# Patient Record
Sex: FEMALE | Race: WHITE | NOT HISPANIC OR LATINO | Employment: OTHER | URBAN - METROPOLITAN AREA
[De-identification: names, ages, dates, MRNs, and addresses within clinical notes are randomized per-mention and may not be internally consistent; named-entity substitution may affect disease eponyms.]

---

## 2024-04-29 ENCOUNTER — OFFICE VISIT (OUTPATIENT)
Dept: FAMILY MEDICINE CLINIC | Facility: CLINIC | Age: 54
End: 2024-04-29

## 2024-04-29 VITALS
OXYGEN SATURATION: 98 % | SYSTOLIC BLOOD PRESSURE: 98 MMHG | HEIGHT: 67 IN | HEART RATE: 88 BPM | TEMPERATURE: 98.8 F | WEIGHT: 133 LBS | BODY MASS INDEX: 20.88 KG/M2 | DIASTOLIC BLOOD PRESSURE: 70 MMHG

## 2024-04-29 DIAGNOSIS — F41.9 ANXIETY: ICD-10-CM

## 2024-04-29 DIAGNOSIS — Z87.891 HISTORY OF TOBACCO ABUSE: ICD-10-CM

## 2024-04-29 DIAGNOSIS — Z79.899 BENZODIAZEPINE USE AGREEMENT EXISTS: ICD-10-CM

## 2024-04-29 DIAGNOSIS — E55.9 VITAMIN D DEFICIENCY: ICD-10-CM

## 2024-04-29 DIAGNOSIS — F51.01 PRIMARY INSOMNIA: ICD-10-CM

## 2024-04-29 DIAGNOSIS — R06.02 SHORTNESS OF BREATH: ICD-10-CM

## 2024-04-29 DIAGNOSIS — E07.9 THYROID DISORDER: ICD-10-CM

## 2024-04-29 DIAGNOSIS — M18.0 PRIMARY OSTEOARTHRITIS OF BOTH FIRST CARPOMETACARPAL JOINTS: ICD-10-CM

## 2024-04-29 DIAGNOSIS — Z00.00 ANNUAL PHYSICAL EXAM: Primary | ICD-10-CM

## 2024-04-29 DIAGNOSIS — R00.2 PALPITATIONS: ICD-10-CM

## 2024-04-29 DIAGNOSIS — I45.6 WPW (WOLFF-PARKINSON-WHITE SYNDROME): ICD-10-CM

## 2024-04-29 DIAGNOSIS — G43.009 MIGRAINE WITHOUT AURA AND WITHOUT STATUS MIGRAINOSUS, NOT INTRACTABLE: ICD-10-CM

## 2024-04-29 DIAGNOSIS — Z13.220 SCREENING FOR LIPID DISORDERS: ICD-10-CM

## 2024-04-29 DIAGNOSIS — K21.9 GASTROESOPHAGEAL REFLUX DISEASE WITHOUT ESOPHAGITIS: ICD-10-CM

## 2024-04-29 PROBLEM — Z86.16 HISTORY OF COVID-19: Status: RESOLVED | Noted: 2020-07-16 | Resolved: 2024-04-29

## 2024-04-29 PROBLEM — R87.610 ASCUS OF CERVIX WITH NEGATIVE HIGH RISK HPV: Status: RESOLVED | Noted: 2021-04-28 | Resolved: 2024-04-29

## 2024-04-29 PROCEDURE — 93000 ELECTROCARDIOGRAM COMPLETE: CPT | Performed by: NURSE PRACTITIONER

## 2024-04-29 PROCEDURE — 99396 PREV VISIT EST AGE 40-64: CPT | Performed by: NURSE PRACTITIONER

## 2024-04-29 RX ORDER — ALBUTEROL SULFATE 90 UG/1
2 AEROSOL, METERED RESPIRATORY (INHALATION) EVERY 6 HOURS PRN
Qty: 18 G | Refills: 2 | Status: SHIPPED | OUTPATIENT
Start: 2024-04-29

## 2024-04-29 NOTE — PROGRESS NOTES
ADULT ANNUAL PHYSICAL  [facility] PRACT[facility]    NAME: Emely Thibodeaux  AGE: 53 y.o. SEX: female  : 1970     DATE: 2024     Assessment and Plan:     Problem List Items Addressed This Visit          Cardiovascular and Mediastinum    WPW (Pedrito-Parkinson-White syndrome)     Not currently following with cardiology and would like a referral back to cardiology to evaluate has been having episodes of increased heart rate IH EKG showing a NSR will apply holter monitor and evaluate for heart rate changes          Relevant Orders    Comprehensive metabolic panel    CBC and differential    Ambulatory Referral to Cardiology    RESOLVED: Migraine without aura and without status migrainosus, not intractable       Digestive    Gastroesophageal reflux disease without esophagitis     Taking the Protonix 40 mg and is effective             Endocrine    Thyroid disorder     TSH ordered          Relevant Orders    Comprehensive metabolic panel    TSH, 3rd generation with Free T4 reflex       Musculoskeletal and Integument    Osteoarthritis of carpometacarpal (CMC) joint of thumb     Taking a supplement and is effective             Behavioral Health    Anxiety     Taking prn xanax smallest amount and is effective          Relevant Orders    TSH, 3rd generation with Free T4 reflex    RESOLVED: History of tobacco abuse       Neurology/Sleep    Insomnia     Taking prn xanax             Other    Vitamin D deficiency    Relevant Orders    Vitamin D 25 hydroxy    Benzodiazepine use agreement exists    Annual physical exam - Primary    Screening for lipid disorders    Relevant Orders    Lipid panel     Other Visit Diagnoses       Shortness of breath        Relevant Medications    albuterol (PROVENTIL HFA,VENTOLIN HFA) 90 mcg/act inhaler    Palpitations        Relevant Orders    POCT ECG (Completed)            Immunizations and preventive care screenings were discussed with patient  today. Appropriate education was printed on patient's after visit summary.    Counseling:  Dental Health: discussed importance of regular tooth brushing, flossing, and dental visits.  Exercise: the importance of regular exercise/physical activity was discussed. Recommend exercise 3-5 times per week for at least 30 minutes.       Depression Screening and Follow-up Plan: Patient was screened for depression during today's encounter. They screened negative with a PHQ-2 score of 0.        No follow-ups on file.     Chief Complaint:     Chief Complaint   Patient presents with    Annual Exam      History of Present Illness:     Adult Annual Physical   Patient here for a comprehensive physical exam. The patient reports no problems.  Patient here today for her check up and reports that she has lost 20 pounds since year and does reports that she is doing well and reports that she had a colonoscopy and reports that she has another once scheduled for this summer had positive polyp. Patient was also  recently. Patient has to update her controlled agreement as well. She also reports that she would like a referral for cardiology she is concerned that she I shaving faster heart rate and is thinking she is having an exacerbation of her WPW and had episodes of fast heart rate.        Diet and Physical Activity  Diet/Nutrition: well balanced diet.   Exercise: moderate cardiovascular exercise, strength training exercises, and 30-60 minutes on average.      Depression Screening  PHQ-2/9 Depression Screening    Little interest or pleasure in doing things: 0 - not at all  Feeling down, depressed, or hopeless: 0 - not at all  PHQ-2 Score: 0  PHQ-2 Interpretation: Negative depression screen       General Health  Sleep: sleeps well.   Hearing: normal - bilateral.  Vision: no vision problems.   Dental: regular dental visits.       /GYN Health  Follows with gynecology? yes   Patient is: perimenopausal  Last menstrual period: every  three weeks  Contraceptive method:  nuva ring .    Advanced Care Planning  Do you have an advanced directive? no  Do you have a durable medical power of ? no  ACP document given to the patient? no     Review of Systems:     Review of Systems   Constitutional:  Negative for activity change, appetite change, chills, diaphoresis, fatigue, fever and unexpected weight change.   HENT:  Negative for congestion, ear pain, hearing loss, postnasal drip, sinus pressure, sinus pain, sneezing and sore throat.    Eyes:  Negative for pain, redness and visual disturbance.   Respiratory:  Negative for cough and shortness of breath.    Cardiovascular:  Positive for palpitations. Negative for chest pain and leg swelling.   Gastrointestinal:  Negative for abdominal pain, diarrhea, nausea and vomiting.   Endocrine: Negative.    Genitourinary: Negative.    Musculoskeletal:  Negative for arthralgias.   Skin: Negative.    Allergic/Immunologic: Negative.    Neurological:  Negative for dizziness and light-headedness.   Hematological: Negative.    Psychiatric/Behavioral:  Positive for sleep disturbance. Negative for behavioral problems and dysphoric mood.       Past Medical History:     Past Medical History:   Diagnosis Date    Anxiety     Arthritis     Chronic pain     Neck. Numbness in fingers    CTS (carpal tunnel syndrome)     Right    DDD (degenerative disc disease), cervical     DDD (degenerative disc disease), lumbar     History of COVID-19     History of echocardiogram 11/26/2012    EF 55%, NWMA, Normal    Insomnia     Orbital floor (blow-out) closed fracture (HCC) 1/20/2011    Palpitations     Pneumonia 2008    WPW (Pedrito-Parkinson-White syndrome)       Past Surgical History:     Past Surgical History:   Procedure Laterality Date    AUGMENTATION MAMMAPLASTY Bilateral 2010    AUGMENTATION MAMMAPLASTY Bilateral 04/01/2022    redone    BREAST SURGERY Bilateral     Augmentation    EPIDURAL BLOCK INJECTION N/A 05/03/2018     Procedure: CERVICAL EPIDURAL STEROID INJECTION;  Surgeon: Kye Webster MD;  Location: [facility];  Service: Pain Management     IA DSTR NROLYTC AGNT PARVERTEB FCT SNGL LMBR/SACRAL Right 12/15/2016    Procedure: LUMBAR RADIOFREQUENCY LESIONING ;  Surgeon: Kye Webster MD;  Location: [facility];  Service: Pain Management     IA NJX DX/THER AGT PVRT FACET JT CRV/THRC 1 LEVEL Right 2018    Procedure: C4-5 C5-6 C7-T1 CERVICAL MEDIAL BRANCH BLOCK;  Surgeon: Kye Webster MD;  Location: [facility];  Service: Pain Management     ROTATOR CUFF REPAIR Right     THYROIDECTOMY, PARTIAL      TRANSUMBILICAL AUGMENTATION MAMMAPLASTY Bilateral       Social History:     Social History     Socioeconomic History    Marital status:      Spouse name: None    Number of children: None    Years of education: None    Highest education level: None   Occupational History    None   Tobacco Use    Smoking status: Former     Current packs/day: 0.00     Average packs/day: 0.3 packs/day for 10.0 years (2.5 ttl pk-yrs)     Types: Cigarettes     Start date: 2008     Quit date: 2018     Years since quittin.0    Smokeless tobacco: Never   Vaping Use    Vaping status: Never Used   Substance and Sexual Activity    Alcohol use: Not Currently     Alcohol/week: 1.0 standard drink of alcohol     Types: 1 Cans of beer per week     Comment: few times week    Drug use: No    Sexual activity: None   Other Topics Concern    None   Social History Narrative    Caffeine use      Social Determinants of Health     Financial Resource Strain: Not on file   Food Insecurity: Not on file   Transportation Needs: Not on file   Physical Activity: Not on file   Stress: Not on file   Social Connections: Not on file   Intimate Partner Violence: Not on file   Housing Stability: Not on file      Family History:     Family History   Problem Relation Age of Onset    Endometrial cancer Mother 70    Arthritis Father     No Known Problems Sister     No  "Known Problems Daughter     No Known Problems Daughter     Lung cancer Maternal Grandmother     No Known Problems Paternal Grandmother     Lung cancer Maternal Aunt     Breast cancer Maternal Aunt 50    Breast cancer Maternal Aunt 58    No Known Problems Maternal Aunt     Cancer Paternal Aunt     Colon cancer Paternal Aunt 58    Breast cancer Paternal Aunt 45    Cancer Paternal Aunt     No Known Problems Paternal Aunt     Thyroid cancer Cousin       Current Medications:     Current Outpatient Medications   Medication Sig Dispense Refill    albuterol (PROVENTIL HFA,VENTOLIN HFA) 90 mcg/act inhaler Inhale 2 puffs every 6 (six) hours as needed for wheezing 18 g 2    ALPRAZolam (XANAX) 0.25 mg tablet take 1 tablet by mouth twice a day if needed for anxiety 60 tablet 0    Biotin 10 MG TABS Take 1 tablet by mouth daily      etonogestrel-ethinyl estradiol (NuvaRing) 0.12-0.015 MG/24HR vaginal ring Insert vaginally and leave in place for 3 consecutive weeks, then remove for 1 week. 3 each 5    multivitamin (THERAGRAN) TABS Take 1 tablet by mouth daily      pantoprazole (PROTONIX) 40 mg tablet take 1 tablet by mouth daily 90 tablet 1    traZODone (DESYREL) 100 mg tablet take 3 tablets by mouth daily at bedtime 270 tablet 1     No current facility-administered medications for this visit.      Allergies:     Allergies   Allergen Reactions    Sulfa Antibiotics Hives      Physical Exam:     BP 98/70 (BP Location: Left arm, Patient Position: Sitting, Cuff Size: Adult)   Pulse 88   Temp 98.8 °F (37.1 °C)   Ht 5' 7\" (1.702 m)   Wt 60.3 kg (133 lb)   SpO2 98%   BMI 20.83 kg/m²     Physical Exam  Vitals and nursing note reviewed.   Constitutional:       General: She is not in acute distress.     Appearance: She is well-developed.   HENT:      Head: Normocephalic and atraumatic.   Eyes:      Conjunctiva/sclera: Conjunctivae normal.   Cardiovascular:      Rate and Rhythm: Normal rate and regular rhythm.      Heart sounds: No " murmur heard.  Pulmonary:      Effort: Pulmonary effort is normal. No respiratory distress.      Breath sounds: Normal breath sounds.   Abdominal:      Palpations: Abdomen is soft.      Tenderness: There is no abdominal tenderness.   Musculoskeletal:         General: No swelling.      Cervical back: Neck supple.   Skin:     General: Skin is warm and dry.      Capillary Refill: Capillary refill takes less than 2 seconds.   Neurological:      Mental Status: She is alert.   Psychiatric:         Mood and Affect: Mood normal.          ROHIT Suazo  [facility]

## 2024-04-29 NOTE — ASSESSMENT & PLAN NOTE
Not currently following with cardiology and would like a referral back to cardiology to evaluate has been having episodes of increased heart rate IH EKG showing a NSR will apply holter monitor and evaluate for heart rate changes

## 2024-05-01 LAB
1OH-MIDAZOLAM UR-MCNC: NEGATIVE NG/ML
6-BETA NALTREXOL UR CFM-MCNC: NEGATIVE NG/ML
6MAM UR QL: NEGATIVE NG/ML
A-OH ALPRAZ UR-MCNC: 113 NG/ML
A-OH-TRIAZOLAM UR-MCNC: NEGATIVE NG/ML
AMPHET UR-MCNC: NEGATIVE NG/ML
AMPHETAMINES UR QL: ABNORMAL NG/ML
BARBITURATES UR QL: NEGATIVE NG/ML
BENZODIAZ UR QL: POSITIVE NG/ML
BUPRENORPHINE UR QL: NEGATIVE NG/ML
BUTYLONE: NEGATIVE NG/ML
BZE UR QL: NEGATIVE NG/ML
CREAT UR-MCNC: 242.6 MG/DL
ETHANOL UR QL: POSITIVE NG/ML
ETHYL GLUCURONIDE UR-MCNC: 3880 NG/ML
ETHYL SULFATE UR-MCNC: 306 NG/ML
FENTANYL: NEGATIVE NG/ML
GABAPENTIN UR-MCNC: NEGATIVE NG/ML
LORAZEPAM UR-MCNC: NEGATIVE NG/ML
MDPV: NEGATIVE NG/ML
MEPHEDRONE: NEGATIVE NG/ML
METHADONE UR QL: NEGATIVE NG/ML
METHAMPHET UR-MCNC: NEGATIVE NG/ML
METHYLONE: NEGATIVE NG/ML
NALTREXONE UR-MCNC: NEGATIVE NG/ML
NORDIAZEPAM UR-MCNC: NEGATIVE NG/ML
OPIATES UR QL: NEGATIVE NG/ML
OXAZEPAM UR-MCNC: NEGATIVE NG/ML
OXIDANTS UR QL: NEGATIVE MCG/ML
OXYCODONE UR QL: NEGATIVE NG/ML
PCP UR QL: NEGATIVE NG/ML
PH UR: 5 [PH] (ref 4.5–9)
SL AMB AMINOCLONAZEPAM: NEGATIVE NG/ML
SL AMB HYDROXYETHYLFLURAZEPAM: NEGATIVE NG/ML
SL AMB MEDMATCH AOH ALPRAZOLAM: ABNORMAL
SL AMB MEDMATCH ETG: ABNORMAL
SL AMB MEDMATCH ETS: ABNORMAL
SL AMB PREGABALIN: NEGATIVE NG/ML
SL AMB RITALINIC ACID: NEGATIVE NG/ML
TEMAZEPAM UR-MCNC: NEGATIVE NG/ML
THC UR QL: NEGATIVE NG/ML

## 2024-05-07 ENCOUNTER — TELEPHONE (OUTPATIENT)
Dept: FAMILY MEDICINE CLINIC | Facility: CLINIC | Age: 54
End: 2024-05-07

## 2024-05-07 ENCOUNTER — PREP FOR PROCEDURE (OUTPATIENT)
Age: 54
End: 2024-05-07

## 2024-05-07 ENCOUNTER — TELEPHONE (OUTPATIENT)
Age: 54
End: 2024-05-07

## 2024-05-07 DIAGNOSIS — Z86.010 HX OF COLONIC POLYPS: Primary | ICD-10-CM

## 2024-05-07 NOTE — TELEPHONE ENCOUNTER
05/07/24  Screened by: Leora Chino    Referring Provider Dr. Montes    Pre- Screening:     There is no height or weight on file to calculate BMI.  Has patient been referred for a routine screening Colonoscopy? yes  Is the patient between 45-75 years old? yes      Previous Colonoscopy    If yes:    Date: 2023    Facility:     Reason:         Does the patient want to see a Gastroenterologist prior to their procedure OR are they having any GI symptoms? no    Has the patient been hospitalized or had abdominal surgery in the past 6 months? no    Does the patient use supplemental oxygen? no    Does the patient take Coumadin, Lovenox, Plavix, Elliquis, Xarelto, or other blood thinning medication? no    Has the patient had a stroke, cardiac event, or stent placed in the past year? no

## 2024-05-07 NOTE — TELEPHONE ENCOUNTER
Scheduled date of colonoscopy (as of today): 7/18/24    Physician performing colonoscopy: Dr. Doherty    Location of colonoscopy:  [facility]    Bowel prep reviewed with patient:  Miralax    Prep Instructions sent via [employer]    Pt daughter called and scheduled appt. Will have patient upload or call in with updated insurance info

## 2024-05-15 ENCOUNTER — APPOINTMENT (OUTPATIENT)
Dept: LAB | Facility: CLINIC | Age: 54
End: 2024-05-15
Payer: COMMERCIAL

## 2024-05-15 DIAGNOSIS — F41.9 ANXIETY: ICD-10-CM

## 2024-05-15 DIAGNOSIS — E07.9 THYROID DISORDER: ICD-10-CM

## 2024-05-15 DIAGNOSIS — Z13.220 SCREENING FOR LIPID DISORDERS: ICD-10-CM

## 2024-05-15 DIAGNOSIS — E55.9 VITAMIN D DEFICIENCY: ICD-10-CM

## 2024-05-15 DIAGNOSIS — I45.6 WPW (WOLFF-PARKINSON-WHITE SYNDROME): ICD-10-CM

## 2024-05-15 LAB
25(OH)D3 SERPL-MCNC: 74.5 NG/ML (ref 30–100)
ALBUMIN SERPL BCP-MCNC: 4 G/DL (ref 3.5–5)
ALP SERPL-CCNC: 25 U/L (ref 34–104)
ALT SERPL W P-5'-P-CCNC: 14 U/L (ref 7–52)
ANION GAP SERPL CALCULATED.3IONS-SCNC: 6 MMOL/L (ref 4–13)
AST SERPL W P-5'-P-CCNC: 14 U/L (ref 13–39)
BASOPHILS # BLD AUTO: 0.04 THOUSANDS/ÂΜL (ref 0–0.1)
BASOPHILS NFR BLD AUTO: 1 % (ref 0–1)
BILIRUB SERPL-MCNC: 0.4 MG/DL (ref 0.2–1)
BUN SERPL-MCNC: 16 MG/DL (ref 5–25)
CALCIUM SERPL-MCNC: 8.8 MG/DL (ref 8.4–10.2)
CHLORIDE SERPL-SCNC: 108 MMOL/L (ref 96–108)
CHOLEST SERPL-MCNC: 186 MG/DL
CO2 SERPL-SCNC: 26 MMOL/L (ref 21–32)
CREAT SERPL-MCNC: 0.91 MG/DL (ref 0.6–1.3)
EOSINOPHIL # BLD AUTO: 0.19 THOUSAND/ÂΜL (ref 0–0.61)
EOSINOPHIL NFR BLD AUTO: 5 % (ref 0–6)
ERYTHROCYTE [DISTWIDTH] IN BLOOD BY AUTOMATED COUNT: 13.4 % (ref 11.6–15.1)
GFR SERPL CREATININE-BSD FRML MDRD: 72 ML/MIN/1.73SQ M
GLUCOSE P FAST SERPL-MCNC: 89 MG/DL (ref 65–99)
HCT VFR BLD AUTO: 41.5 % (ref 34.8–46.1)
HDLC SERPL-MCNC: 84 MG/DL
HGB BLD-MCNC: 13.2 G/DL (ref 11.5–15.4)
IMM GRANULOCYTES # BLD AUTO: 0.01 THOUSAND/UL (ref 0–0.2)
IMM GRANULOCYTES NFR BLD AUTO: 0 % (ref 0–2)
LDLC SERPL CALC-MCNC: 84 MG/DL (ref 0–100)
LYMPHOCYTES # BLD AUTO: 1.05 THOUSANDS/ÂΜL (ref 0.6–4.47)
LYMPHOCYTES NFR BLD AUTO: 25 % (ref 14–44)
MCH RBC QN AUTO: 29.6 PG (ref 26.8–34.3)
MCHC RBC AUTO-ENTMCNC: 31.8 G/DL (ref 31.4–37.4)
MCV RBC AUTO: 93 FL (ref 82–98)
MONOCYTES # BLD AUTO: 0.25 THOUSAND/ÂΜL (ref 0.17–1.22)
MONOCYTES NFR BLD AUTO: 6 % (ref 4–12)
NEUTROPHILS # BLD AUTO: 2.61 THOUSANDS/ÂΜL (ref 1.85–7.62)
NEUTS SEG NFR BLD AUTO: 63 % (ref 43–75)
NONHDLC SERPL-MCNC: 102 MG/DL
NRBC BLD AUTO-RTO: 0 /100 WBCS
PLATELET # BLD AUTO: 211 THOUSANDS/UL (ref 149–390)
PMV BLD AUTO: 11.1 FL (ref 8.9–12.7)
POTASSIUM SERPL-SCNC: 4.2 MMOL/L (ref 3.5–5.3)
PROT SERPL-MCNC: 6.5 G/DL (ref 6.4–8.4)
RBC # BLD AUTO: 4.46 MILLION/UL (ref 3.81–5.12)
SODIUM SERPL-SCNC: 140 MMOL/L (ref 135–147)
TRIGL SERPL-MCNC: 89 MG/DL
TSH SERPL DL<=0.05 MIU/L-ACNC: 1.34 UIU/ML (ref 0.45–4.5)
WBC # BLD AUTO: 4.15 THOUSAND/UL (ref 4.31–10.16)

## 2024-05-15 PROCEDURE — 84443 ASSAY THYROID STIM HORMONE: CPT

## 2024-05-15 PROCEDURE — 80061 LIPID PANEL: CPT

## 2024-05-15 PROCEDURE — 82306 VITAMIN D 25 HYDROXY: CPT

## 2024-05-15 PROCEDURE — 85025 COMPLETE CBC W/AUTO DIFF WBC: CPT

## 2024-05-15 PROCEDURE — 80053 COMPREHEN METABOLIC PANEL: CPT

## 2024-05-15 PROCEDURE — 36415 COLL VENOUS BLD VENIPUNCTURE: CPT

## 2024-05-16 ENCOUNTER — TELEPHONE (OUTPATIENT)
Dept: FAMILY MEDICINE CLINIC | Facility: CLINIC | Age: 54
End: 2024-05-16

## 2024-05-16 NOTE — TELEPHONE ENCOUNTER
----- Message from ROHIT Tejada sent at 5/15/2024  7:44 PM EDT -----  Thyroid is normal   Lipid panel was normal   CMP with normal liver and kidney function and electrolytes   CBC was normal   Vitamin D was normal

## 2024-05-16 NOTE — TELEPHONE ENCOUNTER
Patient returned call.    Advised of results per provider's message.    Patient expressed understanding.

## 2024-05-27 DIAGNOSIS — G47.00 INSOMNIA, UNSPECIFIED TYPE: ICD-10-CM

## 2024-05-27 RX ORDER — TRAZODONE HYDROCHLORIDE 100 MG/1
TABLET ORAL
Qty: 270 TABLET | Refills: 1 | Status: SHIPPED | OUTPATIENT
Start: 2024-05-27

## 2024-05-28 DIAGNOSIS — F41.9 ANXIETY: ICD-10-CM

## 2024-05-29 PROBLEM — Z13.220 SCREENING FOR LIPID DISORDERS: Status: RESOLVED | Noted: 2024-04-29 | Resolved: 2024-05-29

## 2024-05-30 RX ORDER — ALPRAZOLAM 0.25 MG/1
TABLET ORAL
Qty: 60 TABLET | Refills: 0 | Status: SHIPPED | OUTPATIENT
Start: 2024-05-30

## 2024-06-04 NOTE — TELEPHONE ENCOUNTER
Patient called to request that refill be released to pharmacy as she is leaving on vacation at Midnight this evening, 6/4/24.    She will need this refilled prior to leaving as she will NOT have enough mediation to get there through vacation.

## 2024-06-20 ENCOUNTER — ANESTHESIA EVENT (OUTPATIENT)
Dept: ANESTHESIOLOGY | Facility: HOSPITAL | Age: 54
End: 2024-06-20

## 2024-06-20 ENCOUNTER — ANESTHESIA (OUTPATIENT)
Dept: ANESTHESIOLOGY | Facility: HOSPITAL | Age: 54
End: 2024-06-20

## 2024-07-08 DIAGNOSIS — F41.9 ANXIETY: ICD-10-CM

## 2024-07-09 RX ORDER — ALPRAZOLAM 0.25 MG/1
TABLET ORAL
Qty: 60 TABLET | Refills: 0 | Status: SHIPPED | OUTPATIENT
Start: 2024-07-09

## 2024-07-16 ENCOUNTER — CONSULT (OUTPATIENT)
Dept: CARDIOLOGY CLINIC | Facility: CLINIC | Age: 54
End: 2024-07-16
Payer: COMMERCIAL

## 2024-07-16 VITALS
HEIGHT: 67 IN | WEIGHT: 135 LBS | DIASTOLIC BLOOD PRESSURE: 60 MMHG | SYSTOLIC BLOOD PRESSURE: 99 MMHG | HEART RATE: 92 BPM | OXYGEN SATURATION: 97 % | RESPIRATION RATE: 16 BRPM | BODY MASS INDEX: 21.19 KG/M2

## 2024-07-16 DIAGNOSIS — I45.6 WPW (WOLFF-PARKINSON-WHITE SYNDROME): ICD-10-CM

## 2024-07-16 PROCEDURE — 99204 OFFICE O/P NEW MOD 45 MIN: CPT | Performed by: INTERNAL MEDICINE

## 2024-07-16 NOTE — PROGRESS NOTES
OP Consultation - Cardiology    Emely Thibodeaux 53 y.o. female MRN: [id number]    Physician Requesting Consult: Dr Montes    Reason for Consult / Chief Complaint: Establish care, WPW    HPI:     53 year old woman who presents to [facility]  She has a history of WPW    She has noticed brief occasional palpitations over the past many years. Improved    Otherwise no complaints.     Denies chest pain, chest pressure, shortness of breath, dizziness, lightheadedness, presyncope or syncope.  Denies orthopnea, PND or lower limb edema.    Tobacco:Denies    Denies family history of sudden arrhythmic death or sudden cardiac death    FAMILY HISTORY:  Family History   Problem Relation Age of Onset    Endometrial cancer Mother 70    Arthritis Father     No Known Problems Sister     No Known Problems Daughter     No Known Problems Daughter     Lung cancer Maternal Grandmother     No Known Problems Paternal Grandmother     Lung cancer Maternal Aunt     Breast cancer Maternal Aunt 50    Breast cancer Maternal Aunt 58    No Known Problems Maternal Aunt     Cancer Paternal Aunt     Colon cancer Paternal Aunt 58    Breast cancer Paternal Aunt 45    Cancer Paternal Aunt     No Known Problems Paternal Aunt     Thyroid cancer Cousin         MEDS & ALLERGIES:  Allergies   Allergen Reactions    Sulfa Antibiotics Hives         Current Outpatient Medications:     albuterol (PROVENTIL HFA,VENTOLIN HFA) 90 mcg/act inhaler, Inhale 2 puffs every 6 (six) hours as needed for wheezing, Disp: 18 g, Rfl: 2    ALPRAZolam (XANAX) 0.25 mg tablet, take 1 tablet by mouth twice a day if needed for anxiety, Disp: 60 tablet, Rfl: 0    etonogestrel-ethinyl estradiol (NuvaRing) 0.12-0.015 MG/24HR vaginal ring, Insert vaginally and leave in place for 3 consecutive weeks, then remove for 1 week., Disp: 3 each, Rfl: 5    pantoprazole (PROTONIX) 40 mg tablet, take 1 tablet by mouth daily, Disp: 90 tablet, Rfl: 1    traZODone (DESYREL) 100 mg tablet, take 3  "tablets by mouth daily at bedtime, Disp: 270 tablet, Rfl: 1    Past Medical History:   Diagnosis Date    Anxiety     Arthritis     Chronic pain     Neck. Numbness in fingers    CTS (carpal tunnel syndrome)     Right    DDD (degenerative disc disease), cervical     DDD (degenerative disc disease), lumbar     History of COVID-19     History of echocardiogram 11/26/2012    EF 55%, NWMA, Normal    Insomnia     Orbital floor (blow-out) closed fracture (HCC) 1/20/2011    Palpitations     Pneumonia 2008    WPW (Pedrito-Parkinson-White syndrome)          Review of Systems:  Review of Systems   Constitutional: Negative.  Negative for activity change, appetite change, fatigue and unexpected weight change.   Respiratory:  Negative for chest tightness and shortness of breath.    Cardiovascular:  Positive for palpitations. Negative for chest pain and leg swelling.   Gastrointestinal:  Negative for nausea and vomiting.   Skin:  Negative for color change and pallor.   Neurological:  Negative for dizziness, syncope, weakness and light-headedness.   Psychiatric/Behavioral:  Negative for agitation.    All other systems reviewed and are negative.      PHYSICAL EXAM:  Vitals:   Vitals:    07/16/24 0908   BP: 99/60   BP Location: Left arm   Patient Position: Sitting   Cuff Size: Standard   Pulse: 92   Resp: 16   SpO2: 97%   Weight: 61.2 kg (135 lb)   Height: 5' 7\" (1.702 m)        Physical Exam:  Physical Exam  Vitals and nursing note reviewed.   Constitutional:       General: She is not in acute distress.     Appearance: Normal appearance. She is not ill-appearing or diaphoretic.   HENT:      Head: Normocephalic and atraumatic.   Eyes:      Extraocular Movements: Extraocular movements intact.   Cardiovascular:      Rate and Rhythm: Normal rate and regular rhythm.      Heart sounds: No murmur heard.     No friction rub. No gallop.   Pulmonary:      Effort: Pulmonary effort is normal.      Breath sounds: Normal breath sounds.   Abdominal: " "     General: There is no distension.      Palpations: Abdomen is soft.   Musculoskeletal:         General: No swelling. Normal range of motion.      Cervical back: Normal range of motion.   Skin:     General: Skin is warm and dry.      Capillary Refill: Capillary refill takes less than 2 seconds.      Coloration: Skin is not pale.   Neurological:      General: No focal deficit present.      Mental Status: She is alert and oriented to person, place, and time.   Psychiatric:         Mood and Affect: Mood normal.         LABORATORY RESULTS:    Lab Results   Component Value Date    WBC 4.15 (L) 05/15/2024    HGB 13.2 05/15/2024    HCT 41.5 05/15/2024    MCV 93 05/15/2024     05/15/2024     Lab Results   Component Value Date    CALCIUM 8.8 05/15/2024    K 4.2 05/15/2024    CO2 26 05/15/2024     05/15/2024    BUN 16 05/15/2024    CREATININE 0.91 05/15/2024     Lab Results   Component Value Date    HGBA1C 5.3 10/13/2022       Lipid Profile:   No results found for: \"CHOL\"  Lab Results   Component Value Date    HDL 84 05/15/2024    HDL 89 10/13/2022    HDL 84 04/07/2022     Lab Results   Component Value Date    LDLCALC 84 05/15/2024    LDLCALC 56 10/13/2022    LDLCALC 85 04/07/2022     Lab Results   Component Value Date    TRIG 89 05/15/2024    TRIG 109 10/13/2022    TRIG 140 04/07/2022       The 10-year ASCVD risk score (Irma DK, et al., 2019) is: 0.5%    Values used to calculate the score:      Age: 53 years      Sex: Female      Is Non- : No      Diabetic: No      Tobacco smoker: No      Systolic Blood Pressure: 99 mmHg      Is BP treated: No      HDL Cholesterol: 84 mg/dL      Total Cholesterol: 186 mg/dL    Imaging: I have personally reviewed pertinent reports.    No results found.    EKG reviewed personally: NSR    Assessment and Plan:    Emely was seen today for new patient visit.    Diagnoses and all orders for this visit:    WPW (Pedrito-Parkinson-White syndrome)  -     " Ambulatory Referral to Cardiology           WPW    Patient with history of WPW  She has occasional palpitations  Will evaluate with a cardiac event monitor  Will check echocardiogram to rule out structural heart disease  Will check an exercise stress test to evaluate for exercise capacity.  Avoid AV monica blocking agents    Patient will inform us if she has worsening symptoms    Recommend patient presents to the emergency room or call our office if he has any new/persistent or progressive symptoms.    Sign and symptoms of heart disease to look out and report which may need further evaluation were discussed in detail with patient    Return to clinic in 4 weeks or ASTON De La Cruz MD  7/16/2024,9:30 AM

## 2024-07-17 ENCOUNTER — HOSPITAL ENCOUNTER (OUTPATIENT)
Dept: NON INVASIVE DIAGNOSTICS | Facility: HOSPITAL | Age: 54
Discharge: HOME | End: 2024-07-17
Attending: INTERNAL MEDICINE
Payer: COMMERCIAL

## 2024-07-17 VITALS
DIASTOLIC BLOOD PRESSURE: 60 MMHG | BODY MASS INDEX: 21.19 KG/M2 | HEIGHT: 67 IN | SYSTOLIC BLOOD PRESSURE: 99 MMHG | WEIGHT: 135 LBS | HEART RATE: 74 BPM

## 2024-07-17 VITALS — HEIGHT: 67 IN | WEIGHT: 135 LBS | BODY MASS INDEX: 21.19 KG/M2

## 2024-07-17 DIAGNOSIS — I45.6 WPW (WOLFF-PARKINSON-WHITE SYNDROME): ICD-10-CM

## 2024-07-17 LAB
AORTIC ROOT: 2.8 CM
APICAL FOUR CHAMBER EJECTION FRACTION: 60 %
ARRHY DURING EX: NORMAL
ASCENDING AORTA: 2.8 CM
BSA FOR ECHO PROCEDURE: 1.71 M2
CHEST PAIN STATEMENT: NORMAL
E WAVE DECELERATION TIME: 199 MS
E/A RATIO: 1.22
FRACTIONAL SHORTENING: 34 (ref 28–44)
INTERVENTRICULAR SEPTUM IN DIASTOLE (PARASTERNAL SHORT AXIS VIEW): 0.8 CM
INTERVENTRICULAR SEPTUM: 0.8 CM (ref 0.6–1.1)
LA/AORTA RATIO 2D: 1
LAAS-AP2: 8.7 CM2
LAAS-AP4: 9.6 CM2
LEFT ATRIUM SIZE: 2.8 CM
LEFT ATRIUM VOLUME (MOD BIPLANE): 19 ML
LEFT ATRIUM VOLUME INDEX (MOD BIPLANE): 11.1 ML/M2
LEFT INTERNAL DIMENSION IN SYSTOLE: 2.7 CM (ref 2.1–4)
LEFT VENTRICULAR INTERNAL DIMENSION IN DIASTOLE: 4.1 CM (ref 3.5–6)
LEFT VENTRICULAR POSTERIOR WALL IN END DIASTOLE: 0.7 CM
LEFT VENTRICULAR STROKE VOLUME: 46 ML
LVSV (TEICH): 46 ML
MAX DIASTOLIC BP: 62 MMHG
MAX HR PERCENT: 96 %
MAX HR: 162 BPM
MAX PREDICTED HEART RATE: 168 BPM
MV E'TISSUE VEL-SEP: 12 CM/S
MV PEAK A VEL: 0.86 M/S
MV PEAK E VEL: 105 CM/S
MV STENOSIS PRESSURE HALF TIME: 58 MS
MV VALVE AREA P 1/2 METHOD: 3.79
PROTOCOL NAME: NORMAL
RATE PRESSURE PRODUCT: NORMAL
REASON FOR TERMINATION: NORMAL
RIGHT VENTRICLE ID DIMENSION: 3.1 CM
SL CV LV EF: 55
SL CV PED ECHO LEFT VENTRICLE DIASTOLIC VOLUME (MOD BIPLANE) 2D: 74 ML
SL CV PED ECHO LEFT VENTRICLE SYSTOLIC VOLUME (MOD BIPLANE) 2D: 27 ML
SL CV STRESS RECOVERY BP: NORMAL MMHG
SL CV STRESS RECOVERY HR: 90 BPM
SL CV STRESS STAGE REACHED: 3
STRESS BASELINE BP: NORMAL MMHG
STRESS BASELINE HR: 75 BPM
STRESS O2 SAT REST: 98 %
STRESS PEAK HR: 162 BPM
STRESS POST ESTIMATED WORKLOAD: 10.1 METS
STRESS POST EXERCISE DUR MIN: 7 MIN
STRESS POST EXERCISE DUR MIN: 7 MIN
STRESS POST EXERCISE DUR SEC: 0 SEC
STRESS POST EXERCISE DUR SEC: 0 SEC
STRESS POST O2 SAT PEAK: 84 %
STRESS POST PEAK BP: 126 MMHG
STRESS POST PEAK HR: 162 BPM
STRESS POST PEAK SYSTOLIC BP: 126 MMHG
TARGET HR FORMULA: NORMAL
TEST INDICATION: NORMAL
TR MAX PG: 17 MMHG
TR PEAK VELOCITY: 2.1 M/S
TRICUSPID ANNULAR PLANE SYSTOLIC EXCURSION: 2.1 CM
TRICUSPID VALVE PEAK REGURGITATION VELOCITY: 2.09 M/S

## 2024-07-17 PROCEDURE — 93017 CV STRESS TEST TRACING ONLY: CPT

## 2024-07-17 PROCEDURE — 93018 CV STRESS TEST I&R ONLY: CPT | Performed by: INTERNAL MEDICINE

## 2024-07-17 PROCEDURE — 93016 CV STRESS TEST SUPVJ ONLY: CPT | Performed by: INTERNAL MEDICINE

## 2024-07-17 PROCEDURE — 93306 TTE W/DOPPLER COMPLETE: CPT | Performed by: INTERNAL MEDICINE

## 2024-07-17 PROCEDURE — 93306 TTE W/DOPPLER COMPLETE: CPT

## 2024-07-18 ENCOUNTER — ANESTHESIA (OUTPATIENT)
Dept: GASTROENTEROLOGY | Facility: AMBULARY SURGERY CENTER | Age: 54
End: 2024-07-18

## 2024-07-18 ENCOUNTER — HOSPITAL ENCOUNTER (OUTPATIENT)
Dept: GASTROENTEROLOGY | Facility: AMBULARY SURGERY CENTER | Age: 54
Setting detail: OUTPATIENT SURGERY
End: 2024-07-18
Attending: INTERNAL MEDICINE
Payer: COMMERCIAL

## 2024-07-18 ENCOUNTER — ANESTHESIA EVENT (OUTPATIENT)
Dept: GASTROENTEROLOGY | Facility: AMBULARY SURGERY CENTER | Age: 54
End: 2024-07-18

## 2024-07-18 VITALS
RESPIRATION RATE: 24 BRPM | HEART RATE: 76 BPM | SYSTOLIC BLOOD PRESSURE: 95 MMHG | WEIGHT: 126 LBS | DIASTOLIC BLOOD PRESSURE: 56 MMHG | HEIGHT: 67 IN | TEMPERATURE: 98 F | OXYGEN SATURATION: 96 % | BODY MASS INDEX: 19.78 KG/M2

## 2024-07-18 DIAGNOSIS — Z86.010 HX OF COLONIC POLYPS: ICD-10-CM

## 2024-07-18 PROCEDURE — 45385 COLONOSCOPY W/LESION REMOVAL: CPT | Performed by: INTERNAL MEDICINE

## 2024-07-18 PROCEDURE — 88305 TISSUE EXAM BY PATHOLOGIST: CPT | Performed by: PATHOLOGY

## 2024-07-18 RX ORDER — SODIUM CHLORIDE, SODIUM LACTATE, POTASSIUM CHLORIDE, CALCIUM CHLORIDE 600; 310; 30; 20 MG/100ML; MG/100ML; MG/100ML; MG/100ML
INJECTION, SOLUTION INTRAVENOUS CONTINUOUS PRN
Status: DISCONTINUED | OUTPATIENT
Start: 2024-07-18 | End: 2024-07-18

## 2024-07-18 RX ORDER — LIDOCAINE HYDROCHLORIDE 10 MG/ML
INJECTION, SOLUTION EPIDURAL; INFILTRATION; INTRACAUDAL; PERINEURAL AS NEEDED
Status: DISCONTINUED | OUTPATIENT
Start: 2024-07-18 | End: 2024-07-18

## 2024-07-18 RX ORDER — PROPOFOL 10 MG/ML
INJECTION, EMULSION INTRAVENOUS AS NEEDED
Status: DISCONTINUED | OUTPATIENT
Start: 2024-07-18 | End: 2024-07-18

## 2024-07-18 RX ADMIN — PROPOFOL 30 MG: 10 INJECTION, EMULSION INTRAVENOUS at 14:17

## 2024-07-18 RX ADMIN — PROPOFOL 50 MG: 10 INJECTION, EMULSION INTRAVENOUS at 14:14

## 2024-07-18 RX ADMIN — PROPOFOL 20 MG: 10 INJECTION, EMULSION INTRAVENOUS at 14:23

## 2024-07-18 RX ADMIN — PROPOFOL 100 MG: 10 INJECTION, EMULSION INTRAVENOUS at 14:05

## 2024-07-18 RX ADMIN — LIDOCAINE HYDROCHLORIDE 50 MG: 10 INJECTION, SOLUTION EPIDURAL; INFILTRATION; INTRACAUDAL at 14:05

## 2024-07-18 RX ADMIN — PROPOFOL 50 MG: 10 INJECTION, EMULSION INTRAVENOUS at 14:06

## 2024-07-18 RX ADMIN — SODIUM CHLORIDE, SODIUM LACTATE, POTASSIUM CHLORIDE, AND CALCIUM CHLORIDE: .6; .31; .03; .02 INJECTION, SOLUTION INTRAVENOUS at 13:57

## 2024-07-18 RX ADMIN — PROPOFOL 50 MG: 10 INJECTION, EMULSION INTRAVENOUS at 14:08

## 2024-07-18 RX ADMIN — PROPOFOL 20 MG: 10 INJECTION, EMULSION INTRAVENOUS at 14:20

## 2024-07-18 RX ADMIN — PROPOFOL 50 MG: 10 INJECTION, EMULSION INTRAVENOUS at 14:11

## 2024-07-18 RX ADMIN — PROPOFOL 50 MG: 10 INJECTION, EMULSION INTRAVENOUS at 14:07

## 2024-07-18 NOTE — ANESTHESIA POSTPROCEDURE EVALUATION
Post-Op Assessment Note    CV Status:  Stable  Pain Score: 0    Pain management: adequate       Mental Status:  Sleepy and arousable   Hydration Status:  Euvolemic   PONV Controlled:  Controlled   Airway Patency:  Patent     Post Op Vitals Reviewed: Yes    No anethesia notable event occurred.    Staff: CRNA               BP   86/49   Temp 98.9   Pulse 70   Resp 15   SpO2 100

## 2024-07-18 NOTE — ANESTHESIA PREPROCEDURE EVALUATION
Procedure:  COLONOSCOPY    Relevant Problems   CARDIO   (+) WPW (Pedrito-Parkinson-White syndrome)      GI/HEPATIC   (+) Gastroesophageal reflux disease without esophagitis      MUSCULOSKELETAL   (+) Osteoarthritis of carpometacarpal (CMC) joint of thumb      NEURO/PSYCH   (+) Anxiety   Negative Stress 7/1/7/24, EF-55%     Physical Exam    Airway    Mallampati score: II  TM Distance: >3 FB  Neck ROM: full     Dental   No notable dental hx     Cardiovascular      Pulmonary      Other Findings  post-pubertal.      Anesthesia Plan  ASA Score- 2     Anesthesia Type- IV sedation with anesthesia with ASA Monitors.         Additional Monitors:     Airway Plan:            Plan Factors-Exercise tolerance (METS): >4 METS.    Chart reviewed. EKG reviewed. Imaging results reviewed. Existing labs reviewed. Patient summary reviewed.    Patient is not a current smoker.              Induction- intravenous.    Postoperative Plan-         Informed Consent- Anesthetic plan and risks discussed with patient.  I personally reviewed this patient with the CRNA. Discussed and agreed on the Anesthesia Plan with the CRNA..

## 2024-07-18 NOTE — H&P
History and Physical - [facility]  Emely Thibodeaux 53 y.o. female MRN: [id number]    HPI: Emeyl Thibodeaux is a 53 y.o. year old female who presents for colon cancer screening, has history of polyps.      Review of Systems    Historical Information   Past Medical History:   Diagnosis Date    Anxiety     Arthritis     Chronic pain     Neck. Numbness in fingers    Colon polyp     CTS (carpal tunnel syndrome)     Right    DDD (degenerative disc disease), cervical     DDD (degenerative disc disease), lumbar     Disease of thyroid gland     History of COVID-19     History of echocardiogram 11/26/2012    EF 55%, NWMA, Normal    Insomnia     Orbital floor (blow-out) closed fracture (HCC) 01/20/2011    Palpitations     Pneumonia 2008    WPW (Pedrito-Parkinson-White syndrome)      Past Surgical History:   Procedure Laterality Date    AUGMENTATION MAMMAPLASTY Bilateral 2010    AUGMENTATION MAMMAPLASTY Bilateral 04/01/2022    redone    BREAST SURGERY Bilateral     Augmentation    EPIDURAL BLOCK INJECTION N/A 05/03/2018    Procedure: CERVICAL EPIDURAL STEROID INJECTION;  Surgeon: Kye Webster MD;  [facility];  Service: Pain Management     IN DSTR NROLYTC AGNT PARVERTEB FCT SNGL LMBR/SACRAL Right 12/15/2016    Procedure: LUMBAR RADIOFREQUENCY LESIONING ;  Surgeon: Kye Webster MD;  [facility];  Service: Pain Management     IN NJX DX/THER AGT PVRT FACET JT CRV/THRC 1 LEVEL Right 06/07/2018    Procedure: C4-5 C5-6 C7-T1 CERVICAL MEDIAL BRANCH BLOCK;  Surgeon: Kye Webster MD;  [facility];  Service: Pain Management     ROTATOR CUFF REPAIR Right     THYROIDECTOMY, PARTIAL      TRANSUMBILICAL AUGMENTATION MAMMAPLASTY Bilateral      Social History   Social History     Substance and Sexual Activity   Alcohol Use Not Currently    Alcohol/week: 1.0 standard drink of alcohol    Types: 1 Cans of beer per week    Comment: few times week     Social History     Substance and Sexual  "Activity   Drug Use No     Social History     Tobacco Use   Smoking Status Former    Current packs/day: 0.00    Average packs/day: 0.3 packs/day for 10.0 years (2.5 ttl pk-yrs)    Types: Cigarettes    Start date: 2008    Quit date: 2018    Years since quittin.2   Smokeless Tobacco Never     Family History   Problem Relation Age of Onset    Endometrial cancer Mother 70    Arthritis Father     No Known Problems Sister     No Known Problems Daughter     No Known Problems Daughter     Lung cancer Maternal Grandmother     No Known Problems Paternal Grandmother     Lung cancer Maternal Aunt     Breast cancer Maternal Aunt 50    Breast cancer Maternal Aunt 58    No Known Problems Maternal Aunt     Cancer Paternal Aunt     Colon cancer Paternal Aunt 58    Breast cancer Paternal Aunt 45    Cancer Paternal Aunt     No Known Problems Paternal Aunt     Thyroid cancer Cousin        Meds/Allergies     Not in a hospital admission.    Allergies   Allergen Reactions    Sulfa Antibiotics Hives       Objective     /60   Pulse 83   Temp 98.6 °F (37 °C) (Temporal)   Resp 21   Ht 5' 7\" (1.702 m)   Wt 57.2 kg (126 lb)   LMP 2024 (Exact Date) Comment: signed preg. waiver  SpO2 100%   BMI 19.73 kg/m²       PHYSICAL EXAM    Gen: NAD  CV: RRR  CHEST: Clear  ABD: soft, NT/ND  EXT: no edema  Neuro: AAO      ASSESSMENT/PLAN:  This is a 53 y.o. year old female here for history of colon polyps.    PLAN:   Procedure: Colonoscopy.      "

## 2024-07-19 ENCOUNTER — TELEPHONE (OUTPATIENT)
Dept: CARDIOLOGY CLINIC | Facility: CLINIC | Age: 54
End: 2024-07-19

## 2024-07-19 NOTE — TELEPHONE ENCOUNTER
----- Message from James De La Cruz MD sent at 7/18/2024  2:18 PM EDT -----  Please let patient know that echo and stress test were unremarkable

## 2024-07-19 NOTE — TELEPHONE ENCOUNTER
Spoke with pt. Patient verbally understood the result of her  echo and stress test / Dr. De La Cruz's message.

## 2024-07-22 PROCEDURE — 88305 TISSUE EXAM BY PATHOLOGIST: CPT | Performed by: PATHOLOGY

## 2024-07-23 ENCOUNTER — CLINICAL SUPPORT (OUTPATIENT)
Dept: FAMILY MEDICINE CLINIC | Facility: CLINIC | Age: 54
End: 2024-07-23
Payer: COMMERCIAL

## 2024-07-23 ENCOUNTER — TELEPHONE (OUTPATIENT)
Dept: FAMILY MEDICINE CLINIC | Facility: CLINIC | Age: 54
End: 2024-07-23

## 2024-07-23 DIAGNOSIS — Z11.1 ENCOUNTER FOR PPD TEST: Primary | ICD-10-CM

## 2024-07-23 PROCEDURE — 86580 TB INTRADERMAL TEST: CPT | Performed by: NURSE PRACTITIONER

## 2024-07-23 NOTE — TELEPHONE ENCOUNTER
Patient came in for her PPD placement and dropped off a form. Form was put in your folder for signature

## 2024-07-25 LAB
INDURATION: 0 MM
TB SKIN TEST: NEGATIVE

## 2024-07-26 ENCOUNTER — OFFICE VISIT (OUTPATIENT)
Dept: OBGYN CLINIC | Facility: CLINIC | Age: 54
End: 2024-07-26
Payer: COMMERCIAL

## 2024-07-26 VITALS
HEIGHT: 67 IN | SYSTOLIC BLOOD PRESSURE: 104 MMHG | WEIGHT: 126 LBS | BODY MASS INDEX: 19.78 KG/M2 | DIASTOLIC BLOOD PRESSURE: 70 MMHG | HEART RATE: 85 BPM

## 2024-07-26 DIAGNOSIS — G89.29 CHRONIC RIGHT SHOULDER PAIN: ICD-10-CM

## 2024-07-26 DIAGNOSIS — M25.511 CHRONIC RIGHT SHOULDER PAIN: ICD-10-CM

## 2024-07-26 DIAGNOSIS — M75.81 TENDINITIS OF RIGHT ROTATOR CUFF: Primary | ICD-10-CM

## 2024-07-26 PROCEDURE — 20610 DRAIN/INJ JOINT/BURSA W/O US: CPT | Performed by: ORTHOPAEDIC SURGERY

## 2024-07-26 PROCEDURE — 99203 OFFICE O/P NEW LOW 30 MIN: CPT | Performed by: ORTHOPAEDIC SURGERY

## 2024-07-26 RX ORDER — BUPIVACAINE HYDROCHLORIDE 2.5 MG/ML
4 INJECTION, SOLUTION INFILTRATION; PERINEURAL
Status: COMPLETED | OUTPATIENT
Start: 2024-07-26 | End: 2024-07-26

## 2024-07-26 RX ORDER — TRIAMCINOLONE ACETONIDE 40 MG/ML
80 INJECTION, SUSPENSION INTRA-ARTICULAR; INTRAMUSCULAR
Status: COMPLETED | OUTPATIENT
Start: 2024-07-26 | End: 2024-07-26

## 2024-07-26 RX ADMIN — BUPIVACAINE HYDROCHLORIDE 4 ML: 2.5 INJECTION, SOLUTION INFILTRATION; PERINEURAL at 09:00

## 2024-07-26 RX ADMIN — TRIAMCINOLONE ACETONIDE 80 MG: 40 INJECTION, SUSPENSION INTRA-ARTICULAR; INTRAMUSCULAR at 09:00

## 2024-07-26 NOTE — PROGRESS NOTES
ASSESSMENT/PLAN:    Diagnoses and all orders for this visit:    Chronic right shoulder pain  -     XR shoulder 2+ vw right; Future    Tendinitis of right rotator cuff    Other orders  -     Large joint arthrocentesis        X-rays of the patient's right shoulder are negative for any fracture dislocations.  She has rotator cuff tendinitis along her shoulder.  Possible treatment options were discussed with the patient.  A corticosteroid injection was offered to the patient to which she accepted.  Her right shoulder was injected with Kenalog and Marcaine.  She tolerated the procedure quite well.  She will follow-up with our office in 3 months.  She is acceptable to this plan.    Return in about 3 months (around 10/26/2024).    The patient has impingement of her right shoulder.  She had a previous rotator cuff repair in which the the strength is intact.  The shoulder was injected with Kenalog and Marcaine.  She tolerated procedure quite well.  Return back 6 weeks for reevaluation.  If her condition changes, she would not hesitate to let us know      _____________________________________________________  CHIEF COMPLAINT:  Chief Complaint   Patient presents with    Right Shoulder - Pain         SUBJECTIVE:  Emely Thibodeaux is a 53 y.o. female who presents to our office complaining of right shoulder pain.  The patient has a remote history of a right shoulder arthroscopy with rotator cuff repair.  She complains of worsening pain along her shoulder.  She denies any fever or chills.  She denies any new falls or trauma.    The following portions of the patient's history were reviewed and updated as appropriate: allergies, current medications, past family history, past medical history, past social history, past surgical history and problem list.    PAST MEDICAL HISTORY:  Past Medical History:   Diagnosis Date    Anxiety     Arthritis     Chronic pain     Neck. Numbness in fingers    Colon polyp     CTS (carpal tunnel syndrome)      Right    DDD (degenerative disc disease), cervical     DDD (degenerative disc disease), lumbar     Disease of thyroid gland     History of COVID-19     History of echocardiogram 11/26/2012    EF 55%, NWMA, Normal    Insomnia     Orbital floor (blow-out) closed fracture (HCC) 01/20/2011    Palpitations     Pneumonia 2008    WPW (Pedrito-Parkinson-White syndrome)        PAST SURGICAL HISTORY:  Past Surgical History:   Procedure Laterality Date    AUGMENTATION MAMMAPLASTY Bilateral 2010    AUGMENTATION MAMMAPLASTY Bilateral 04/01/2022    redone    BREAST SURGERY Bilateral     Augmentation    EPIDURAL BLOCK INJECTION N/A 05/03/2018    Procedure: CERVICAL EPIDURAL STEROID INJECTION;  Surgeon: Kye Webster MD;  [facility];  Service: Pain Management     MA DSTR NROLYTC AGNT PARVERTEB FCT SNGL LMBR/SACRAL Right 12/15/2016    Procedure: LUMBAR RADIOFREQUENCY LESIONING ;  Surgeon: Kye Webster MD;  [facility];  Service: Pain Management     MA NJX DX/THER AGT PVRT FACET JT CRV/THRC 1 LEVEL Right 06/07/2018    Procedure: C4-5 C5-6 C7-T1 CERVICAL MEDIAL BRANCH BLOCK;  Surgeon: Kye Webster MD;  [facility];  Service: Pain Management     ROTATOR CUFF REPAIR Right     THYROIDECTOMY, PARTIAL      TRANSUMBILICAL AUGMENTATION MAMMAPLASTY Bilateral        FAMILY HISTORY:  Family History   Problem Relation Age of Onset    Endometrial cancer Mother 70    Arthritis Father     No Known Problems Sister     No Known Problems Daughter     No Known Problems Daughter     Lung cancer Maternal Grandmother     No Known Problems Paternal Grandmother     Lung cancer Maternal Aunt     Breast cancer Maternal Aunt 50    Breast cancer Maternal Aunt 58    No Known Problems Maternal Aunt     Cancer Paternal Aunt     Colon cancer Paternal Aunt 58    Breast cancer Paternal Aunt 45    Cancer Paternal Aunt     No Known Problems Paternal Aunt     Thyroid cancer Cousin        SOCIAL HISTORY:  Social History     Tobacco Use     Smoking status: Former     Current packs/day: 0.00     Average packs/day: 0.3 packs/day for 10.0 years (2.5 ttl pk-yrs)     Types: Cigarettes     Start date: 2008     Quit date: 2018     Years since quittin.2    Smokeless tobacco: Never   Vaping Use    Vaping status: Never Used   Substance Use Topics    Alcohol use: Not Currently     Alcohol/week: 1.0 standard drink of alcohol     Types: 1 Cans of beer per week     Comment: few times week    Drug use: No       MEDICATIONS:    Current Outpatient Medications:     albuterol (PROVENTIL HFA,VENTOLIN HFA) 90 mcg/act inhaler, Inhale 2 puffs every 6 (six) hours as needed for wheezing, Disp: 18 g, Rfl: 2    ALPRAZolam (XANAX) 0.25 mg tablet, take 1 tablet by mouth twice a day if needed for anxiety, Disp: 60 tablet, Rfl: 0    etonogestrel-ethinyl estradiol (NuvaRing) 0.12-0.015 MG/24HR vaginal ring, Insert vaginally and leave in place for 3 consecutive weeks, then remove for 1 week., Disp: 3 each, Rfl: 5    pantoprazole (PROTONIX) 40 mg tablet, take 1 tablet by mouth daily, Disp: 90 tablet, Rfl: 1    traZODone (DESYREL) 100 mg tablet, take 3 tablets by mouth daily at bedtime, Disp: 270 tablet, Rfl: 1    ALLERGIES:  Allergies   Allergen Reactions    Sulfa Antibiotics Hives       ROS:  Review of Systems     Constitutional: Negative for fatigue, fever or loss of appetite.   HENT: Negative.    Respiratory: Negative for shortness of breath, dyspnea.    Cardiovascular: Negative for chest pain/tightness.   Gastrointestinal: Negative for abdominal pain, N/V.   Endocrine: Negative for cold/heat intolerance, unexplained weight loss/gain.   Genitourinary: Negative for flank pain, dysuria, hematuria.   Musculoskeletal: Positive for arthralgia   Skin: Negative for rash.    Neurological: Negative for numbness or tingling  Psychiatric/Behavioral: Negative for agitation.  _____________________________________________________  PHYSICAL EXAMINATION:    Blood pressure 104/70, pulse  "85, height 5' 7\" (1.702 m), weight 57.2 kg (126 lb), last menstrual period 06/27/2024.    Constitutional: Oriented to person, place, and time. Appears well-developed and well-nourished. No distress.   HENT:   Head: Normocephalic.   Eyes: Conjunctivae are normal. Right eye exhibits no discharge. Left eye exhibits no discharge. No scleral icterus.   Cardiovascular: Normal rate.    Pulmonary/Chest: Effort normal.   Neurological: Alert and oriented to person, place, and time.   Skin: Skin is warm and dry. No rash noted. Not diaphoretic. No erythema. No pallor.   Psychiatric: Normal mood and affect. Behavior is normal. Judgment and thought content normal.      MUSCULOSKELETAL EXAMINATION:   Physical Exam  Ortho Exam    Right upper extremity is neurovascularly intact  Fingers are pink and mobile  Compartments are soft  Range of motion of the shoulder is from 0 to 165 degrees of forward flexion abduction  Brisk cap refill  Sensation intact  Rotator cuff strength 5 out of 5    Objective:  BP Readings from Last 1 Encounters:   07/26/24 104/70      Wt Readings from Last 1 Encounters:   07/26/24 57.2 kg (126 lb)        BMI:   Estimated body mass index is 19.73 kg/m² as calculated from the following:    Height as of this encounter: 5' 7\" (1.702 m).    Weight as of this encounter: 57.2 kg (126 lb).      PROCEDURES PERFORMED:  Large joint arthrocentesis: R subacromial bursa  Universal Protocol:  Risks and benefits: risks, benefits and alternatives were discussed  Consent given by: patient  Patient understanding: patient states understanding of the procedure being performed  Site marked: the operative site was marked  Patient identity confirmed: verbally with patient  Supporting Documentation  Indications: pain   Procedure Details  Location: shoulder - R subacromial bursa  Preparation: Patient was prepped and draped in the usual sterile fashion  Needle size: 22 G  Ultrasound guidance: no  Approach: lateral  Medications " administered: 4 mL bupivacaine 0.25 %; 80 mg triamcinolone acetonide 40 mg/mL    Patient tolerance: patient tolerated the procedure well with no immediate complications  Dressing:  Sterile dressing applied            Scribe Attestation      I,:  Rich Arechiga PA-C am acting as a scribe while in the presence of the attending physician.:       I,:  Yosi Knott DO personally performed the services described in this documentation    as scribed in my presence.:

## 2024-07-31 ENCOUNTER — CLINICAL SUPPORT (OUTPATIENT)
Dept: CARDIOLOGY CLINIC | Facility: CLINIC | Age: 54
End: 2024-07-31
Payer: COMMERCIAL

## 2024-07-31 DIAGNOSIS — I45.6 WPW (WOLFF-PARKINSON-WHITE SYNDROME): ICD-10-CM

## 2024-07-31 PROCEDURE — 93272 ECG/REVIEW INTERPRET ONLY: CPT | Performed by: INTERNAL MEDICINE

## 2024-08-01 ENCOUNTER — TELEPHONE (OUTPATIENT)
Dept: CARDIOLOGY CLINIC | Facility: CLINIC | Age: 54
End: 2024-08-01

## 2024-08-01 NOTE — TELEPHONE ENCOUNTER
"----- Message from Santiago Oconnor MD sent at 7/31/2024  2:22 PM EDT -----  Preliminary Findings Signature Date Patient monitored for 8d 17h 22m 8 events were transmitted. 4 patient triggered; 4 auto triggered SVT occurred 1 time(s) with fastest run 130 BPM PACs were not found during the monitoring period PVCs were not found during the monitoring period    The patient was monitored for 14 days.  High heart rate was 130 bpm-sinus tachycardia.  Low heart rate was 50 bpm.  Average heart rate was 83 bpm.    There were no PVCs.  There were rare PACs.  At 1634 the patient had 5 PACs in a row at a rate of 130 bpm.  At that time the patient noted \"skipped beat\".    On 7/17 the patient noted \"shortness of breath\".  The rhythm at that time was a mild sinus tachycardia.    Conclusions:    1.  1 brief run of 5 PACs in a row at a rate of 130 bpm accompanied by \"skipped beat.\"  2.  No other significant abnormality  "

## 2024-08-07 ENCOUNTER — TELEPHONE (OUTPATIENT)
Dept: FAMILY MEDICINE CLINIC | Facility: CLINIC | Age: 54
End: 2024-08-07

## 2024-08-07 ENCOUNTER — TELEPHONE (OUTPATIENT)
Age: 54
End: 2024-08-07

## 2024-08-07 DIAGNOSIS — G56.01 CARPAL TUNNEL SYNDROME OF RIGHT WRIST: Primary | ICD-10-CM

## 2024-08-07 DIAGNOSIS — F41.9 ANXIETY: ICD-10-CM

## 2024-08-07 RX ORDER — ALPRAZOLAM 0.25 MG/1
TABLET ORAL
Qty: 60 TABLET | Refills: 0 | Status: SHIPPED | OUTPATIENT
Start: 2024-08-07

## 2024-08-07 NOTE — TELEPHONE ENCOUNTER
Patient is requesting a referral to someone to look at her rt hand.  She states she had carpal tunnel in her rt hand years ago (about 10 she thinks) and never had surgery on it.  It has gotten progressively more painful over the past 2 months or so.

## 2024-08-07 NOTE — TELEPHONE ENCOUNTER
Spoke to patient and informed her that we are just waiting for the medication to be approved by the provider. Patient understands. No further questions.

## 2024-08-13 ENCOUNTER — OFFICE VISIT (OUTPATIENT)
Dept: URGENT CARE | Facility: CLINIC | Age: 54
End: 2024-08-13
Payer: COMMERCIAL

## 2024-08-13 VITALS
HEART RATE: 95 BPM | SYSTOLIC BLOOD PRESSURE: 100 MMHG | OXYGEN SATURATION: 98 % | BODY MASS INDEX: 20.75 KG/M2 | RESPIRATION RATE: 16 BRPM | TEMPERATURE: 97.5 F | WEIGHT: 132.5 LBS | DIASTOLIC BLOOD PRESSURE: 64 MMHG

## 2024-08-13 DIAGNOSIS — H66.90 ACUTE OTITIS MEDIA, UNSPECIFIED OTITIS MEDIA TYPE: Primary | ICD-10-CM

## 2024-08-13 PROCEDURE — 99213 OFFICE O/P EST LOW 20 MIN: CPT | Performed by: PHYSICAL MEDICINE & REHABILITATION

## 2024-08-13 RX ORDER — PREDNISONE 20 MG/1
40 TABLET ORAL DAILY
Qty: 10 TABLET | Refills: 0 | Status: SHIPPED | OUTPATIENT
Start: 2024-08-13 | End: 2024-08-18

## 2024-08-13 NOTE — PROGRESS NOTES
"  [facility]        NAME: Emely Thibodeaux is a 53 y.o. female  : 1970    MRN: [id number]  DATE: 2024  TIME: 1:44 PM    Assessment and Plan   Acute otitis media, unspecified otitis media type [H66.90]  1. Acute otitis media, unspecified otitis media type  amoxicillin-clavulanate (AUGMENTIN) 875-125 mg per tablet    predniSONE 20 mg tablet            Patient Instructions       Follow up with PCP in 3-5 days.  Proceed to  ER if symptoms worsen.    If tests are performed, our office will contact you with results only if changes need to made to the care plan discussed with you at the visit. [facility]    Chief Complaint     Chief Complaint   Patient presents with    Earache     Bilateral, dizziness and vertigo for 1 day. Hearing is muffled. Denies fever or chills, no cold sx. Pt states she has not been swimming recently. States she can \"hear herself breathing\".          History of Present Illness       Patient presenting with bilateral earaches, dizziness and vertigo since 25. She notes that her hearing is muffled. She denies further cold-like symptoms. She does report swimming lately.     Earache         Review of Systems   Review of Systems   Constitutional: Negative.    HENT:  Positive for ear pain.    Respiratory: Negative.     Cardiovascular: Negative.    Gastrointestinal: Negative.    Musculoskeletal: Negative.    Neurological:  Positive for dizziness.         Current Medications       Current Outpatient Medications:     albuterol (PROVENTIL HFA,VENTOLIN HFA) 90 mcg/act inhaler, Inhale 2 puffs every 6 (six) hours as needed for wheezing, Disp: 18 g, Rfl: 2    ALPRAZolam (XANAX) 0.25 mg tablet, take 1 tablet by mouth twice a day if needed for anxiety, Disp: 60 tablet, Rfl: 0    amoxicillin-clavulanate (AUGMENTIN) 875-125 mg per tablet, Take 1 tablet by mouth every 12 (twelve) hours for 7 days, Disp: 14 tablet, Rfl: 0    etonogestrel-ethinyl " estradiol (NuvaRing) 0.12-0.015 MG/24HR vaginal ring, Insert vaginally and leave in place for 3 consecutive weeks, then remove for 1 week., Disp: 3 each, Rfl: 5    pantoprazole (PROTONIX) 40 mg tablet, take 1 tablet by mouth daily, Disp: 90 tablet, Rfl: 1    predniSONE 20 mg tablet, Take 2 tablets (40 mg total) by mouth daily for 5 days, Disp: 10 tablet, Rfl: 0    traZODone (DESYREL) 100 mg tablet, take 3 tablets by mouth daily at bedtime, Disp: 270 tablet, Rfl: 1    Current Allergies     Allergies as of 08/13/2024 - Reviewed 08/13/2024   Allergen Reaction Noted    Sulfa antibiotics Hives 04/09/2021            The following portions of the patient's history were reviewed and updated as appropriate: allergies, current medications, past family history, past medical history, past social history, past surgical history and problem list.     Past Medical History:   Diagnosis Date    Anxiety     Arthritis     Chronic pain     Neck. Numbness in fingers    Colon polyp     CTS (carpal tunnel syndrome)     Right    DDD (degenerative disc disease), cervical     DDD (degenerative disc disease), lumbar     Disease of thyroid gland     History of COVID-19     History of echocardiogram 11/26/2012    EF 55%, NWMA, Normal    Insomnia     Orbital floor (blow-out) closed fracture (HCC) 01/20/2011    Palpitations     Pneumonia 2008    WPW (Pedrito-Parkinson-White syndrome)        Past Surgical History:   Procedure Laterality Date    AUGMENTATION MAMMAPLASTY Bilateral 2010    AUGMENTATION MAMMAPLASTY Bilateral 04/01/2022    redone    BREAST SURGERY Bilateral     Augmentation    EPIDURAL BLOCK INJECTION N/A 05/03/2018    Procedure: CERVICAL EPIDURAL STEROID INJECTION;  Surgeon: Kye Webster MD;  [facility];  Service: Pain Management     [place] DSTR NROLYTC AGNT PARVERTEB FCT SNGL LMBR/SACRAL Right 12/15/2016    Procedure: LUMBAR RADIOFREQUENCY LESIONING ;  Surgeon: Kye Webster MD;  [facility];  Service: Pain Management      CA NJX DX/THER AGT PVRT FACET JT CRV/THRC 1 LEVEL Right 06/07/2018    Procedure: C4-5 C5-6 C7-T1 CERVICAL MEDIAL BRANCH BLOCK;  Surgeon: Kye Webster MD;  [facility];  Service: Pain Management     ROTATOR CUFF REPAIR Right     THYROIDECTOMY, PARTIAL      TRANSUMBILICAL AUGMENTATION MAMMAPLASTY Bilateral        Family History   Problem Relation Age of Onset    Endometrial cancer Mother 70    Arthritis Father     No Known Problems Sister     No Known Problems Daughter     No Known Problems Daughter     Lung cancer Maternal Grandmother     No Known Problems Paternal Grandmother     Lung cancer Maternal Aunt     Breast cancer Maternal Aunt 50    Breast cancer Maternal Aunt 58    No Known Problems Maternal Aunt     Cancer Paternal Aunt     Colon cancer Paternal Aunt 58    Breast cancer Paternal Aunt 45    Cancer Paternal Aunt     No Known Problems Paternal Aunt     Thyroid cancer Cousin          Medications have been verified.        Objective   /64   Pulse 95   Temp 97.5 °F (36.4 °C)   Resp 16   Wt 60.1 kg (132 lb 8 oz)   LMP 06/27/2024 (Exact Date) Comment: signed preg. waiver  SpO2 98%   BMI 20.75 kg/m²        Physical Exam     Physical Exam  Vitals reviewed.   Constitutional:       General: She is not in acute distress.     Appearance: She is not ill-appearing.   HENT:      Right Ear: Tympanic membrane is erythematous and bulging.      Left Ear: Tympanic membrane is bulging. Tympanic membrane is not erythematous.   Cardiovascular:      Rate and Rhythm: Normal rate and regular rhythm.      Pulses: Normal pulses.      Heart sounds: Normal heart sounds. No murmur heard.  Pulmonary:      Effort: Pulmonary effort is normal. No respiratory distress.      Breath sounds: Normal breath sounds.   Neurological:      General: No focal deficit present.      Mental Status: She is alert.

## 2024-08-15 ENCOUNTER — OFFICE VISIT (OUTPATIENT)
Dept: OBGYN CLINIC | Facility: CLINIC | Age: 54
End: 2024-08-15
Payer: COMMERCIAL

## 2024-08-15 ENCOUNTER — NURSE TRIAGE (OUTPATIENT)
Age: 54
End: 2024-08-15

## 2024-08-15 DIAGNOSIS — R29.898 RIGHT ARM WEAKNESS: ICD-10-CM

## 2024-08-15 DIAGNOSIS — G56.01 CARPAL TUNNEL SYNDROME OF RIGHT WRIST: ICD-10-CM

## 2024-08-15 DIAGNOSIS — M62.81 ACUTE RIGHT-SIDED MUSCLE WEAKNESS: Primary | ICD-10-CM

## 2024-08-15 DIAGNOSIS — G56.21 CUBITAL TUNNEL SYNDROME ON RIGHT: ICD-10-CM

## 2024-08-15 PROCEDURE — 99204 OFFICE O/P NEW MOD 45 MIN: CPT | Performed by: SURGERY

## 2024-08-15 NOTE — TELEPHONE ENCOUNTER
Regarding: Right sided sudden onset of weakness for last 4 weeks  ----- Message from Bhumika LARSEN sent at 8/15/2024  3:38 PM EDT -----  Patient was triaged and scheduled and placed on the wait list     Patient experiencing right sided weakness that started 4 weeks ago     Patient referred to us by Ortho  with a ASAP referral     Patient just asking to speak to a medically trained staff to help guide them to what to do

## 2024-08-15 NOTE — TELEPHONE ENCOUNTER
"Answer Assessment - Initial Assessment Questions  1. REASON FOR CALL or QUESTION: \"What is your reason for calling today?\" or \"How can I best help you?\" or \"What question do you have that I can help answer?\"      See Message below.    Message from Bhumika LARSEN sent at 8/15/2024  3:38 PM EDT -----  Patient was triaged and scheduled and placed on the wait list    Protocols used: Information Only Call - No Triage-ADULT-[place]    "

## 2024-08-15 NOTE — TELEPHONE ENCOUNTER
Called patient regarding message below. Unable to leave a message.     Per note below, patient already Triaged and scheduled.

## 2024-08-15 NOTE — PROGRESS NOTES
Bayron Zuluaga M.D.  Attending, Orthopaedic Surgery  Hand, Wrist, and Elbow Surgery  [facility]      ORTHOPAEDIC HAND, WRIST, AND ELBOW OFFICE  VISIT       ASSESSMENT/PLAN:      54 yo female with suspected carpal and cubital tunnel syndrome in the setting of relatively recent and progressing right sided weakness, tremors, and increased difficulty with motor movements.     Discussed with the patient that while I do suspect some degree of carpal and cubital tunnel syndrome I also suspect there may be something else going on neurologically. We were unable to find her previous EMG from around 2017 but given the recent change and rapid progression of her symptoms I think a new EMG is warranted anyway, this was ordered STAT today. Neurology consult ordered as well, and a message was sent to the patient's PCP prior to her upcoming visit next month. We discussed treatments for the median and ulnar nerve compressions including bracing, injections, and surgery. Patient wishes to avoid surgery if possible and is open for injections, however I would like to defer these until her EMG is complete and she is further evaluated.   We will see Emely after her EMG is complete.     The patient verbalized understanding of exam findings and treatment plan. We engaged in the shared decision-making process and treatment options were discussed at length with the patient. Surgical and conservative management discussed today along with risks and benefits.    Diagnoses and all orders for this visit:    Acute right-sided muscle weakness  -     Ambulatory Referral to Neurology; Future    Carpal tunnel syndrome of right wrist  -     Ambulatory referral to Orthopedic Surgery  -     EMG 2 Limb Upper Extremity; Future    Right arm weakness  -     EMG 2 Limb Upper Extremity; Future  -     Ambulatory Referral to Neurology; Future    Cubital tunnel syndrome on right  -     EMG 2 Limb Upper Extremity; Future        Follow  Up:  Return for After Testing.    To Do Next Visit:  Re-evaluation of current issue      General Discussions:  Carpal Tunnel Syndrome: The anatomy and physiology of carpal tunnel syndrome was discussed with the patient today.  Increase pressure localized under the transverse carpal ligament can cause pain, numbness, tingling, or dysesthesias within the median nerve distribution as well as feelings of fatigue, clumsiness, or awkwardness.  These symptoms typically occur at night and worse in the morning upon waking.  Eventually, untreated carpal tunnel syndrome can result in weakness and permanent loss of muscle within the thenar compartment of the hand.  Treatment options were discussed with the patient.  Conservative treatment includes nocturnal resting splints to keep the nerve in a neutral position, ergonomic changes within the work or home environment, activity modification, and tendon gliding exercises. Vitamin B6 one tablet daily over the counter may helpful to reduce symptoms.   Steroid injections within the carpal canal can help a majority of patients, however this is often self-limited in a majority of patients.  Surgical intervention to divide the transverse carpal ligament typically results in a long-lasting relief of the patient's complaints, with the recurrence rate of less than 1%.                                                                                                                                                                                 Cubital Tunnel Syndrome: The anatomy and physiology of cubital tunnel syndrome were discussed with the patient today in the office.  Typically, increased elbow flexion activities decrease blood flow within the intraneural spaces, resulting in a feeling of numbness, tingling, weakness, or clumsiness within the hand and fingers.  Occasionally, anatomic structures such as medial elbow osteophytes, the medial head of the triceps, were subluxing ulnar nerve  may result in increased pressure or aggravation at the cubital tunnel.  Typical signs and symptoms usually include numbness and tingling within the ring and small finger, weakness with , and weakness with pinch.  Conservative treatment and includes nocturnal bracing to keep the elbow in a semi-extended position, activity modification, therapy, and avoiding excessive elbow flexion activities.  Vitamin B6 one tablet daily over the counter may helpful to reduce symptoms.  A majority of patients typically respond to conservative treatment over a period of approximately 3-6 months.  EMG/NCV testing of the ulnar nerve at the elbow is not as reliable as carpal tunnel syndrome.  Surgical intervention in the form of in situ release of the ulnar nerve at the elbow or ulnar nerve transposition may be required in up to 20% of patients.       ____________________________________________________________________________________________________________________________________________      CHIEF COMPLAINT:  Chief Complaint   Patient presents with    Right Hand - Carpal Tunnel     Patient has carpal runnel she was wearing braces years ago and they helped       SUBJECTIVE:  Emely Thibodeaux is a 53 y.o. year old LHD female seen in consultation requested by Dr Rachelle Correa who presents for evaluation of the right upper extremity. She notes years of nocturnal paresthesias in the right hand and reports she was seen previously and got an EMG which showed carpal tunnel. She wore a brace at the time and this resolved. More recently she notes rapid progression of weakness, tremors, and difficulty performing simple motions with the hand. She also notes that she has increasing difficulty using her right foot to put on sandals. She reports paresthesias only at nigh in the radial 4 digits, not during the day. She has associated medial elbow pain over the ulnar nerve with radicular symptoms into the hand. Patient reports multiple functional  deficits with the right hand including opening jars, gripping, and even flexing the fingers. She denies injuries or stroke like symptoms. She has hx of cervical and lumbar disc disease and did have ablations in the lumbar spine years ago with no complications     Pain/symptom timing:  Worse during the day when active  Pain/symptom context:  Worse with activites and work  Pain/symptom modifying factors:  Rest makes better, activities make worse  Pain/symptom associated signs/symptoms: none    Prior treatment   NSAIDsNo   Injections No   Bracing/Orthotics Yes    Physical Therapy No     I have personally reviewed all the relevant PMH, PSH, SH, FH, Medications and allergies      PAST MEDICAL HISTORY:  Past Medical History:   Diagnosis Date    Anxiety     Arthritis     Chronic pain     Neck. Numbness in fingers    Colon polyp     CTS (carpal tunnel syndrome)     Right    DDD (degenerative disc disease), cervical     DDD (degenerative disc disease), lumbar     Disease of thyroid gland     History of COVID-19     History of echocardiogram 11/26/2012    EF 55%, NWMA, Normal    Insomnia     Orbital floor (blow-out) closed fracture (HCC) 01/20/2011    Palpitations     Pneumonia 2008    WPW (Pedrito-Parkinson-White syndrome)        PAST SURGICAL HISTORY:  Past Surgical History:   Procedure Laterality Date    AUGMENTATION MAMMAPLASTY Bilateral 2010    AUGMENTATION MAMMAPLASTY Bilateral 04/01/2022    redone    BREAST SURGERY Bilateral     Augmentation    EPIDURAL BLOCK INJECTION N/A 05/03/2018    Procedure: CERVICAL EPIDURAL STEROID INJECTION;  Surgeon: Kye Webster MD;  [facility];  Service: Pain Management     NJ DSTR NROLYTC AGNT PARVERTEB FCT SNGL LMBR/SACRAL Right 12/15/2016    Procedure: LUMBAR RADIOFREQUENCY LESIONING ;  Surgeon: Kye Webster MD;  [facility];  Service: Pain Management     NJ NJX DX/THER AGT PVRT FACET JT CRV/THRC 1 LEVEL Right 06/07/2018    Procedure: C4-5 C5-6 C7-T1 CERVICAL MEDIAL  BRANCH BLOCK;  Surgeon: Kye Webster MD;  [facility];  Service: Pain Management     ROTATOR CUFF REPAIR Right     THYROIDECTOMY, PARTIAL      TRANSUMBILICAL AUGMENTATION MAMMAPLASTY Bilateral        FAMILY HISTORY:  Family History   Problem Relation Age of Onset    Endometrial cancer Mother 70    Arthritis Father     No Known Problems Sister     No Known Problems Daughter     No Known Problems Daughter     Lung cancer Maternal Grandmother     No Known Problems Paternal Grandmother     Lung cancer Maternal Aunt     Breast cancer Maternal Aunt 50    Breast cancer Maternal Aunt 58    No Known Problems Maternal Aunt     Cancer Paternal Aunt     Colon cancer Paternal Aunt 58    Breast cancer Paternal Aunt 45    Cancer Paternal Aunt     No Known Problems Paternal Aunt     Thyroid cancer Cousin        SOCIAL HISTORY:  Social History     Tobacco Use    Smoking status: Former     Current packs/day: 0.00     Average packs/day: 0.3 packs/day for 10.0 years (2.5 ttl pk-yrs)     Types: Cigarettes     Start date: 2008     Quit date: 2018     Years since quittin.2    Smokeless tobacco: Never   Vaping Use    Vaping status: Never Used   Substance Use Topics    Alcohol use: Not Currently     Alcohol/week: 1.0 standard drink of alcohol     Types: 1 Cans of beer per week     Comment: few times week    Drug use: No       MEDICATIONS:    Current Outpatient Medications:     albuterol (PROVENTIL HFA,VENTOLIN HFA) 90 mcg/act inhaler, Inhale 2 puffs every 6 (six) hours as needed for wheezing, Disp: 18 g, Rfl: 2    ALPRAZolam (XANAX) 0.25 mg tablet, take 1 tablet by mouth twice a day if needed for anxiety, Disp: 60 tablet, Rfl: 0    amoxicillin-clavulanate (AUGMENTIN) 875-125 mg per tablet, Take 1 tablet by mouth every 12 (twelve) hours for 7 days, Disp: 14 tablet, Rfl: 0    etonogestrel-ethinyl estradiol (NuvaRing) 0.12-0.015 MG/24HR vaginal ring, Insert vaginally and leave in place for 3 consecutive weeks, then remove  for 1 week., Disp: 3 each, Rfl: 5    pantoprazole (PROTONIX) 40 mg tablet, take 1 tablet by mouth daily, Disp: 90 tablet, Rfl: 1    predniSONE 20 mg tablet, Take 2 tablets (40 mg total) by mouth daily for 5 days, Disp: 10 tablet, Rfl: 0    traZODone (DESYREL) 100 mg tablet, take 3 tablets by mouth daily at bedtime, Disp: 270 tablet, Rfl: 1    ALLERGIES:  Allergies   Allergen Reactions    Sulfa Antibiotics Hives           REVIEW OF SYSTEMS:  Review of Systems   Constitutional:  Negative for chills, fatigue and fever.   HENT:  Negative for hearing loss, nosebleeds and sore throat.    Eyes:  Negative for redness and visual disturbance.   Respiratory:  Negative for cough, shortness of breath and wheezing.    Cardiovascular:  Negative for chest pain, palpitations and leg swelling.   Gastrointestinal:  Negative for abdominal pain, nausea and vomiting.   Endocrine: Negative for polydipsia and polyuria.   Genitourinary:  Negative for difficulty urinating, dysuria and hematuria.   Musculoskeletal:  Negative for arthralgias, joint swelling and myalgias.   Skin:  Negative for rash and wound.   Neurological:  Positive for tremors, weakness and numbness. Negative for speech difficulty and headaches.   Psychiatric/Behavioral:  Negative for decreased concentration and suicidal ideas. The patient is not nervous/anxious.        VITALS:  There were no vitals filed for this visit.    LABS:      _____________________________________________________  PHYSICAL EXAMINATION:  General: well developed and well nourished, alert, oriented times 3, and appears comfortable  Psychiatric: Normal  HEENT: Normocephalic, Atraumatic Trachea Midline, No torticollis  Pulmonary: No audible wheezing or respiratory distress   Abdomen/GI: Non tender, non distended   Cardiovascular: No pitting edema, 2+ radial pulse   Skin: No masses, erythema, lacerations, fluctation, ulcerations. She does have some red marks over the dorsal wrist on the  right  Neurovascular: Pulses Intact and notable weakness as dictated below  Musculoskeletal: Normal, except as noted in detailed exam and in HPI.      MUSCULOSKELETAL EXAMINATION:  Right upper extremity:   Skin intact  TOM is slightly smaller than the left, which may be due to muscular atrophy or possibly baseline due to her right side being her non-dominant arm  Patient has visible tremors even at rest   Visibly weak with active flexion of the digits  4+/5 strength at biceps, triceps, FF of shoulder  4/5 wrist flexion/extension, digital flexion/extension, intrinsics, deep flexors, and thumb flexors   Tenderness along the ulnar nerve  + carpal tunnel testing   Sensation intact to light touch     ___________________________________________________  STUDIES REVIEWED:  No available studies to review     LABS REVIEWED:    HgA1c:   Lab Results   Component Value Date    HGBA1C 5.3 10/13/2022     BMP:   Lab Results   Component Value Date    CALCIUM 8.8 05/15/2024    K 4.2 05/15/2024    CO2 26 05/15/2024     05/15/2024    BUN 16 05/15/2024    CREATININE 0.91 05/15/2024               PROCEDURES PERFORMED:  Procedures  No Procedures performed today    _____________________________________________________      Scribe Attestation      I,:  Savanna Lloyd PA-C am acting as a scribe while in the presence of the attending physician.:       I,:  Bayron Zuluaga MD personally performed the services described in this documentation    as scribed in my presence.:

## 2024-08-20 ENCOUNTER — APPOINTMENT (OUTPATIENT)
Dept: MRI IMAGING | Facility: HOSPITAL | Age: 54
End: 2024-08-20
Payer: COMMERCIAL

## 2024-08-20 ENCOUNTER — HOSPITAL ENCOUNTER (OUTPATIENT)
Facility: HOSPITAL | Age: 54
Setting detail: OBSERVATION
Discharge: HOME | End: 2024-08-22
Attending: EMERGENCY MEDICINE | Admitting: INTERNAL MEDICINE
Payer: COMMERCIAL

## 2024-08-20 ENCOUNTER — APPOINTMENT (EMERGENCY)
Dept: CT IMAGING | Facility: HOSPITAL | Age: 54
End: 2024-08-20
Payer: COMMERCIAL

## 2024-08-20 DIAGNOSIS — R53.1 RIGHT SIDED WEAKNESS: Primary | ICD-10-CM

## 2024-08-20 DIAGNOSIS — R25.1 TREMOR: ICD-10-CM

## 2024-08-20 PROBLEM — R29.90 STROKE-LIKE SYMPTOMS: Status: ACTIVE | Noted: 2024-08-20

## 2024-08-20 LAB
ALBUMIN SERPL BCG-MCNC: 4 G/DL (ref 3.5–5)
ALP SERPL-CCNC: 28 U/L (ref 34–104)
ALT SERPL W P-5'-P-CCNC: 16 U/L (ref 7–52)
ANION GAP SERPL CALCULATED.3IONS-SCNC: 7 MMOL/L (ref 4–13)
APTT PPP: 23 SECONDS (ref 23–34)
AST SERPL W P-5'-P-CCNC: 13 U/L (ref 13–39)
BASOPHILS # BLD AUTO: 0.04 THOUSANDS/ÂΜL (ref 0–0.1)
BASOPHILS NFR BLD AUTO: 1 % (ref 0–1)
BILIRUB SERPL-MCNC: 0.48 MG/DL (ref 0.2–1)
BUN SERPL-MCNC: 10 MG/DL (ref 5–25)
CALCIUM SERPL-MCNC: 9 MG/DL (ref 8.4–10.2)
CHLORIDE SERPL-SCNC: 104 MMOL/L (ref 96–108)
CK SERPL-CCNC: 33 U/L (ref 26–192)
CO2 SERPL-SCNC: 26 MMOL/L (ref 21–32)
CREAT SERPL-MCNC: 0.76 MG/DL (ref 0.6–1.3)
CRP SERPL QL: <1 MG/L
EOSINOPHIL # BLD AUTO: 0.12 THOUSAND/ÂΜL (ref 0–0.61)
EOSINOPHIL NFR BLD AUTO: 2 % (ref 0–6)
ERYTHROCYTE [DISTWIDTH] IN BLOOD BY AUTOMATED COUNT: 13.6 % (ref 11.6–15.1)
ERYTHROCYTE [SEDIMENTATION RATE] IN BLOOD: 1 MM/HOUR (ref 0–29)
GFR SERPL CREATININE-BSD FRML MDRD: 90 ML/MIN/1.73SQ M
GLUCOSE SERPL-MCNC: 108 MG/DL (ref 65–140)
GLUCOSE SERPL-MCNC: 119 MG/DL (ref 65–140)
HCT VFR BLD AUTO: 40.6 % (ref 34.8–46.1)
HGB BLD-MCNC: 13.5 G/DL (ref 11.5–15.4)
IMM GRANULOCYTES # BLD AUTO: 0.04 THOUSAND/UL (ref 0–0.2)
IMM GRANULOCYTES NFR BLD AUTO: 1 % (ref 0–2)
INR PPP: 0.88 (ref 0.85–1.19)
LYMPHOCYTES # BLD AUTO: 1.38 THOUSANDS/ÂΜL (ref 0.6–4.47)
LYMPHOCYTES NFR BLD AUTO: 18 % (ref 14–44)
MCH RBC QN AUTO: 30.5 PG (ref 26.8–34.3)
MCHC RBC AUTO-ENTMCNC: 33.3 G/DL (ref 31.4–37.4)
MCV RBC AUTO: 92 FL (ref 82–98)
MONOCYTES # BLD AUTO: 0.44 THOUSAND/ÂΜL (ref 0.17–1.22)
MONOCYTES NFR BLD AUTO: 6 % (ref 4–12)
NEUTROPHILS # BLD AUTO: 5.77 THOUSANDS/ÂΜL (ref 1.85–7.62)
NEUTS SEG NFR BLD AUTO: 72 % (ref 43–75)
NRBC BLD AUTO-RTO: 0 /100 WBCS
PLATELET # BLD AUTO: 196 THOUSANDS/UL (ref 149–390)
PMV BLD AUTO: 10.3 FL (ref 8.9–12.7)
POTASSIUM SERPL-SCNC: 3.8 MMOL/L (ref 3.5–5.3)
PROT SERPL-MCNC: 6.7 G/DL (ref 6.4–8.4)
PROTHROMBIN TIME: 12.7 SECONDS (ref 12.3–15)
RBC # BLD AUTO: 4.42 MILLION/UL (ref 3.81–5.12)
SODIUM SERPL-SCNC: 137 MMOL/L (ref 135–147)
TSH SERPL DL<=0.05 MIU/L-ACNC: 1.04 UIU/ML (ref 0.45–4.5)
WBC # BLD AUTO: 7.79 THOUSAND/UL (ref 4.31–10.16)

## 2024-08-20 PROCEDURE — 70498 CT ANGIOGRAPHY NECK: CPT

## 2024-08-20 PROCEDURE — 85652 RBC SED RATE AUTOMATED: CPT

## 2024-08-20 PROCEDURE — 80053 COMPREHEN METABOLIC PANEL: CPT | Performed by: EMERGENCY MEDICINE

## 2024-08-20 PROCEDURE — 86140 C-REACTIVE PROTEIN: CPT

## 2024-08-20 PROCEDURE — 99223 1ST HOSP IP/OBS HIGH 75: CPT | Performed by: INTERNAL MEDICINE

## 2024-08-20 PROCEDURE — 93005 ELECTROCARDIOGRAM TRACING: CPT

## 2024-08-20 PROCEDURE — 85025 COMPLETE CBC W/AUTO DIFF WBC: CPT | Performed by: EMERGENCY MEDICINE

## 2024-08-20 PROCEDURE — 84443 ASSAY THYROID STIM HORMONE: CPT | Performed by: INTERNAL MEDICINE

## 2024-08-20 PROCEDURE — 99285 EMERGENCY DEPT VISIT HI MDM: CPT

## 2024-08-20 PROCEDURE — 99285 EMERGENCY DEPT VISIT HI MDM: CPT | Performed by: EMERGENCY MEDICINE

## 2024-08-20 PROCEDURE — 36415 COLL VENOUS BLD VENIPUNCTURE: CPT | Performed by: EMERGENCY MEDICINE

## 2024-08-20 PROCEDURE — 82607 VITAMIN B-12: CPT

## 2024-08-20 PROCEDURE — 82550 ASSAY OF CK (CPK): CPT

## 2024-08-20 PROCEDURE — 70496 CT ANGIOGRAPHY HEAD: CPT

## 2024-08-20 PROCEDURE — 82948 REAGENT STRIP/BLOOD GLUCOSE: CPT

## 2024-08-20 PROCEDURE — 85610 PROTHROMBIN TIME: CPT | Performed by: EMERGENCY MEDICINE

## 2024-08-20 PROCEDURE — 85730 THROMBOPLASTIN TIME PARTIAL: CPT | Performed by: EMERGENCY MEDICINE

## 2024-08-20 RX ORDER — TRAZODONE HYDROCHLORIDE 100 MG/1
300 TABLET ORAL
Status: DISCONTINUED | OUTPATIENT
Start: 2024-08-20 | End: 2024-08-22 | Stop reason: HOSPADM

## 2024-08-20 RX ORDER — HEPARIN SODIUM 5000 [USP'U]/ML
5000 INJECTION, SOLUTION INTRAVENOUS; SUBCUTANEOUS EVERY 8 HOURS SCHEDULED
Status: DISCONTINUED | OUTPATIENT
Start: 2024-08-20 | End: 2024-08-22

## 2024-08-20 RX ORDER — ALPRAZOLAM 0.25 MG
0.25 TABLET ORAL 2 TIMES DAILY PRN
Status: DISCONTINUED | OUTPATIENT
Start: 2024-08-20 | End: 2024-08-22 | Stop reason: HOSPADM

## 2024-08-20 RX ORDER — ASPIRIN 81 MG/1
81 TABLET, CHEWABLE ORAL DAILY
Status: DISCONTINUED | OUTPATIENT
Start: 2024-08-21 | End: 2024-08-21

## 2024-08-20 RX ORDER — ASPIRIN 325 MG
325 TABLET ORAL ONCE
Status: COMPLETED | OUTPATIENT
Start: 2024-08-20 | End: 2024-08-20

## 2024-08-20 RX ORDER — ATORVASTATIN CALCIUM 40 MG/1
40 TABLET, FILM COATED ORAL EVERY EVENING
Status: DISCONTINUED | OUTPATIENT
Start: 2024-08-20 | End: 2024-08-21

## 2024-08-20 RX ADMIN — ALPRAZOLAM 0.25 MG: 0.25 TABLET ORAL at 22:22

## 2024-08-20 RX ADMIN — TRAZODONE HYDROCHLORIDE 300 MG: 100 TABLET ORAL at 22:24

## 2024-08-20 RX ADMIN — IOHEXOL 75 ML: 350 INJECTION, SOLUTION INTRAVENOUS at 12:15

## 2024-08-20 RX ADMIN — ASPIRIN 325 MG ORAL TABLET 325 MG: 325 PILL ORAL at 13:38

## 2024-08-20 RX ADMIN — AMOXICILLIN AND CLAVULANATE POTASSIUM 1 TABLET: 875; 125 TABLET, FILM COATED ORAL at 22:22

## 2024-08-20 RX ADMIN — ATORVASTATIN CALCIUM 40 MG: 40 TABLET, FILM COATED ORAL at 17:23

## 2024-08-20 NOTE — H&P
[facility]  H&P  Name: Emely Thibodeaux 53 y.o. female I MRN: [id number]  Unit/Bed#: ED-29 I Date of Admission: 8/20/2024   Date of Service: 8/20/2024 I Hospital Day: 0      Assessment & Plan   * Stroke-like symptoms  Assessment & Plan  Presents with 2-week duration of right upper extremity and right lower extremity weakness, upper extremity weakness greater than lower extremity, associated with tremors in the upper and lower extremities, right lower extremity predominantly twitching.  Symptoms not getting any better and hence presented to the emergency department.  CTA head and neck in the emergency department no acute infarct, occlusion.  Patient will be admitted for further stroke workup including MRI brain, lipid panel, A1c, TSH, neurology evaluation.  Patient has received 325 mg of aspirin in the emergency department, will start on 81 mg daily.  Start on atorvastatin from [facility].    WPW (Pedrito-Parkinson-White syndrome)  Assessment & Plan  History of WPW, palpitations, patient seen cardiology in July 2024 and underwent echocardiogram, exercise stress test which came back within normal limit.  Avoid AV monica blocking agents.    Insomnia  Assessment & Plan  Patient takes trazodone 300 mg daily at night.    Anxiety  Assessment & Plan  Xanax at night as needed.         VTE Prophylaxis: Heparin  / sequential compression device   Code Status: Full code  POLST: There is no POLST form on file for this patient (pre-hospital)  Discussion with family: Patient's  at the bedside    Anticipated Length of Stay:  Patient will be admitted on an Observation basis with an anticipated length of stay of less than 2 midnights.   Justification for Hospital Stay: Strokelike symptoms    Total Time for Visit, including Counseling / Coordination of Care:  75 minutes .  Greater than 50% of this total time spent on direct patient counseling and coordination of care.    Chief Complaint:   Right upper  extremity and right lower extremity weakness    History of Present Illness:    Emely Thibodeaux is a 53 y.o. female who presents with past medical history of anxiety, insomnia, WPW presents with complaints of right upper extremity and right lower extremity weakness.  Going on for almost 2 weeks, gradually getting worse.  Initially it started as tingling sensation in the right upper extremity predominantly around the elbow radiating up to the hand on the right side now progressed to tremors, weakness, incoordination.  For the last 2 days patient also noted to have some speech difficulty.  Patient  also noticed some facial asymmetry with right-sided droopiness of the last 2 days.    Complains of dull headache, denies any chest pain, palpitations, shortness of breath, abdominal pain, nausea vomiting.  Denies any urinary or bowel complaints.  Denies any fever, chills.    Currently being treated for right otitis media with Augmentin and prednisone, diagnosed on August 13 in the outpatient urgent care setting.    Review of Systems:    More than 10 system review of systems was performed, pertinent positive and negative findings mentioned as per history of present illness.    Past Medical and Surgical History:     Past Medical History:   Diagnosis Date    Anxiety     Arthritis     Chronic pain     Neck. Numbness in fingers    Colon polyp     CTS (carpal tunnel syndrome)     Right    DDD (degenerative disc disease), cervical     DDD (degenerative disc disease), lumbar     Disease of thyroid gland     History of COVID-19     History of echocardiogram 11/26/2012    EF 55%, NWMA, Normal    Insomnia     Orbital floor (blow-out) closed fracture (HCC) 01/20/2011    Palpitations     Pneumonia 2008    WPW (Pedrito-Parkinson-White syndrome)        Past Surgical History:   Procedure Laterality Date    AUGMENTATION MAMMAPLASTY Bilateral 2010    AUGMENTATION MAMMAPLASTY Bilateral 04/01/2022    redone    BREAST SURGERY Bilateral      Augmentation    EPIDURAL BLOCK INJECTION N/A 05/03/2018    Procedure: CERVICAL EPIDURAL STEROID INJECTION;  Surgeon: Kye Webster MD;  [facility];  Service: Pain Management     MS DSTR NROLYTC AGNT PARVERTEB FCT SNGL LMBR/SACRAL Right 12/15/2016    Procedure: LUMBAR RADIOFREQUENCY LESIONING ;  Surgeon: Kye Webster MD;  [facility];  Service: Pain Management     MS NJX DX/THER AGT PVRT FACET JT CRV/THRC 1 LEVEL Right 06/07/2018    Procedure: C4-5 C5-6 C7-T1 CERVICAL MEDIAL BRANCH BLOCK;  Surgeon: Kye Webster MD;  [facility];  Service: Pain Management     ROTATOR CUFF REPAIR Right     THYROIDECTOMY, PARTIAL      TRANSUMBILICAL AUGMENTATION MAMMAPLASTY Bilateral        Meds/Allergies:    Prior to Admission medications    Medication Sig Start Date End Date Taking? Authorizing Provider   albuterol (PROVENTIL HFA,VENTOLIN HFA) 90 mcg/act inhaler Inhale 2 puffs every 6 (six) hours as needed for wheezing 4/29/24   ROHIT Suazo   ALPRAZolam (XANAX) 0.25 mg tablet take 1 tablet by mouth twice a day if needed for anxiety 8/7/24   Lyla Sparks MD   amoxicillin-clavulanate (AUGMENTIN) 875-125 mg per tablet Take 1 tablet by mouth every 12 (twelve) hours for 7 days 8/13/24 8/20/24  Lucia Cardenas PA-C   etonogestrel-ethinyl estradiol (NuvaRing) 0.12-0.015 MG/24HR vaginal ring Insert vaginally and leave in place for 3 consecutive weeks, then remove for 1 week. 4/18/22   Rachelle Correa DO   pantoprazole (PROTONIX) 40 mg tablet take 1 tablet by mouth daily 4/1/24   Niki Lorenz PA-C   traZODone (DESYREL) 100 mg tablet take 3 tablets by mouth daily at bedtime 5/27/24   ROHIT Suazo     I have reviewed home medications with patient personally.    Allergies:   Allergies   Allergen Reactions    Sulfa Antibiotics Hives       Social History:     Marital Status: /Civil Union     Substance Use History:   Social History     Substance and Sexual Activity    Alcohol Use Not Currently    Alcohol/week: 1.0 standard drink of alcohol    Types: 1 Cans of beer per week    Comment: few times week     Social History     Tobacco Use   Smoking Status Former    Current packs/day: 0.00    Average packs/day: 0.3 packs/day for 10.0 years (2.5 ttl pk-yrs)    Types: Cigarettes    Start date: 2008    Quit date: 2018    Years since quittin.3   Smokeless Tobacco Never     Social History     Substance and Sexual Activity   Drug Use No       Family History:    non-contributory    Physical Exam:     Vitals:   Blood Pressure: 139/70 (24 1400)  Pulse: 90 (24 1400)  Temperature: 98 °F (36.7 °C) (24 1034)  Temp Source: Oral (24 1034)  Respirations: 18 (24 1400)  Weight - Scale: 61 kg (134 lb 7.7 oz) (24 1033)  SpO2: 97 % (24 1400)    Physical Exam    General appearance:  Patient not in acute distress  Eyes:  Pupils equal reacting to light  ENT:  Moist oral mucous membranes  CVS:  S1-S2 heard, regular rate and rhythm, no pedal edema  Chest:  Bilateral air entry present, clear to auscultation  Abdomen:  Soft, nontender, bowel sounds present  CNS: No clear facial asymmetry however slight droopiness on the left side  No significant dysarthria or word finding difficulty  Right upper extremity tremors, incoordination, weakness strength 4/5  Right lower extremity weakness and mild incoordination strength 4/5  Genitourinary: deferred  Skin:  No acute rash   psychiatric:  No psychosis  Musculoskeletal:  No joint deformities       Additional Data:     Lab Results: I have personally reviewed pertinent reports.      Results from last 7 days   Lab Units 24  1107   WBC Thousand/uL 7.79   HEMOGLOBIN g/dL 13.5   HEMATOCRIT % 40.6   PLATELETS Thousands/uL 196   SEGS PCT % 72   LYMPHO PCT % 18   MONO PCT % 6   EOS PCT % 2     Results from last 7 days   Lab Units 24  1107   SODIUM mmol/L 137   POTASSIUM mmol/L 3.8   CHLORIDE mmol/L 104   CO2 mmol/L  26   BUN mg/dL 10   CREATININE mg/dL 0.76   ANION GAP mmol/L 7   CALCIUM mg/dL 9.0   ALBUMIN g/dL 4.0   TOTAL BILIRUBIN mg/dL 0.48   ALK PHOS U/L 28*   ALT U/L 16   AST U/L 13   GLUCOSE RANDOM mg/dL 108     Results from last 7 days   Lab Units 08/20/24  1107   INR  0.88     Results from last 7 days   Lab Units 08/20/24  1036   POC GLUCOSE mg/dl 119               Imaging: I have personally reviewed pertinent reports.      CTA head and neck with and without contrast   Final Result by Collins Weaver MD (08/20 1314)      CT head:   -No acute vascular territory infarct, intracranial hemorrhage or mass effect. If there is persistent symptoms recommend MRI of the brain for further evaluation.      CTA head:   -No large vessel occlusion, high-grade stenosis or aneurysm.      CTA neck:   Right brachiocephalic and proximal left common carotid artery cannot be evaluated. Streak artifact limits evaluation of the distal V2 segments of the vertebral arteries. Otherwise:   -No high-grade stenosis or dissection or aneurysm.      Other:   -Sequelae of right thyroidectomy. Subcentimeter left thyroid lobe hypodensity. Correlate with thyroid ultrasound.               Workstation performed: [id number]         MRI Inpatient Order    (Results Pending)       EKG, Pathology, and Other Studies Reviewed on Admission:   EKG: Sinus rhythm, reviewed personally by me no acute ST-T wave changes.    Allscripts / Epic Records Reviewed: Yes     ** Please Note: This note has been constructed using a voice recognition system. **

## 2024-08-20 NOTE — ED PROVIDER NOTES
History  Chief Complaint   Patient presents with    CVA/TIA-like Symptoms     Pt reports right sided weakness, right sided facial droop, right sided arm and leg weakness xcouple days, bruising on right arm denies injury/trauma     Patient is a 53-year-old female who presents with right sided weakness and tremors.  Patient states that she noticed a tremor in her right upper extremity about 2 weeks ago that has consistently worsened.  She has also noticed weakness and coordination issues with her right upper and lower extremities for the past 2 weeks.  She also states that this has progressively worsened.  Her right upper extremity has multiple bruises which she states occurred from minor trauma.  She notes easy bruising that only has occurred to her right upper extremity.  She initially went to a hand surgeon thinking her symptoms were possibly secondary to carpal tunnel syndrome.  However she was told that she would need a neurologic workup.  Patient has noticed that she has to concentrate on finding the correct words and occasionally stumbles on her words.  None of the symptoms started or acutely worsened in the last 24 hours.      History provided by:  Patient  CVA/TIA-like Symptoms      Prior to Admission Medications   Prescriptions Last Dose Informant Patient Reported? Taking?   ALPRAZolam (XANAX) 0.25 mg tablet   No No   Sig: take 1 tablet by mouth twice a day if needed for anxiety   albuterol (PROVENTIL HFA,VENTOLIN HFA) 90 mcg/act inhaler  Self No No   Sig: Inhale 2 puffs every 6 (six) hours as needed for wheezing   amoxicillin-clavulanate (AUGMENTIN) 875-125 mg per tablet   No No   Sig: Take 1 tablet by mouth every 12 (twelve) hours for 7 days   etonogestrel-ethinyl estradiol (NuvaRing) 0.12-0.015 MG/24HR vaginal ring  Self No No   Sig: Insert vaginally and leave in place for 3 consecutive weeks, then remove for 1 week.   pantoprazole (PROTONIX) 40 mg tablet Not Taking Self No No   Sig: take 1 tablet by  mouth daily   Patient not taking: Reported on 8/20/2024   traZODone (DESYREL) 100 mg tablet  Self No No   Sig: take 3 tablets by mouth daily at bedtime      Facility-Administered Medications: None       Past Medical History:   Diagnosis Date    Anxiety     Arthritis     Chronic pain     Neck. Numbness in fingers    Colon polyp     CTS (carpal tunnel syndrome)     Right    DDD (degenerative disc disease), cervical     DDD (degenerative disc disease), lumbar     Disease of thyroid gland     History of COVID-19     History of echocardiogram 11/26/2012    EF 55%, NWMA, Normal    Insomnia     Orbital floor (blow-out) closed fracture (HCC) 01/20/2011    Palpitations     Pneumonia 2008    WPW (Pedrito-Parkinson-White syndrome)        Past Surgical History:   Procedure Laterality Date    AUGMENTATION MAMMAPLASTY Bilateral 2010    AUGMENTATION MAMMAPLASTY Bilateral 04/01/2022    redone    BREAST SURGERY Bilateral     Augmentation    EPIDURAL BLOCK INJECTION N/A 05/03/2018    Procedure: CERVICAL EPIDURAL STEROID INJECTION;  Surgeon: Kye Webster MD;  Location: [facility];  Service: Pain Management     WA DSTR NROLYTC AGNT PARVERTEB FCT SNGL LMBR/SACRAL Right 12/15/2016    Procedure: LUMBAR RADIOFREQUENCY LESIONING ;  Surgeon: Kye Webster MD;  Location: [facility];  Service: Pain Management     WA NJX DX/THER AGT PVRT FACET JT CRV/THRC 1 LEVEL Right 06/07/2018    Procedure: C4-5 C5-6 C7-T1 CERVICAL MEDIAL BRANCH BLOCK;  Surgeon: Kye Webster MD;  Location: [facility];  Service: Pain Management     ROTATOR CUFF REPAIR Right     THYROIDECTOMY, PARTIAL      TRANSUMBILICAL AUGMENTATION MAMMAPLASTY Bilateral        Family History   Problem Relation Age of Onset    Endometrial cancer Mother 70    Arthritis Father     No Known Problems Sister     No Known Problems Daughter     No Known Problems Daughter     Lung cancer Maternal Grandmother     No Known Problems Paternal Grandmother     Lung cancer Maternal Aunt     Breast cancer  Maternal Aunt 50    Breast cancer Maternal Aunt 58    No Known Problems Maternal Aunt     Cancer Paternal Aunt     Colon cancer Paternal Aunt 58    Breast cancer Paternal Aunt 45    Cancer Paternal Aunt     No Known Problems Paternal Aunt     Thyroid cancer Cousin      I have reviewed and agree with the history as documented.    E-Cigarette/Vaping    E-Cigarette Use Never User      E-Cigarette/Vaping Substances    Nicotine No     THC No     CBD No     Flavoring No     Other No      Social History     Tobacco Use    Smoking status: Former     Current packs/day: 0.00     Average packs/day: 0.3 packs/day for 10.0 years (2.5 ttl pk-yrs)     Types: Cigarettes     Start date: 2008     Quit date: 2018     Years since quittin.3    Smokeless tobacco: Never   Vaping Use    Vaping status: Never Used   Substance Use Topics    Alcohol use: Not Currently     Alcohol/week: 1.0 standard drink of alcohol     Types: 1 Cans of beer per week     Comment: few times week    Drug use: No       Review of Systems    Physical Exam  Physical Exam    Vital Signs  ED Triage Vitals   Temperature Pulse Respirations Blood Pressure SpO2   24 1034 24 1032 24 1032 24 1032 24 1032   98 °F (36.7 °C) 95 16 157/95 99 %      Temp Source Heart Rate Source Patient Position - Orthostatic VS BP Location FiO2 (%)   24 1034 24 1032 24 1032 24 1032 --   Oral Monitor Sitting Right arm       Pain Score       24 1032       No Pain           Vitals:    24 2209 24 0309 24 0711 24 1114   BP: 99/62 103/62 118/68 114/72   Pulse: 74 75 81 74   Patient Position - Orthostatic VS: Lying  Lying          Visual Acuity  Visual Acuity      Flowsheet Row Most Recent Value   L Pupil Size (mm) 3   R Pupil Size (mm) 3   L Pupil Shape Round   R Pupil Shape Round            ED Medications  Medications   iohexol (OMNIPAQUE) 350 MG/ML injection (MULTI-DOSE) 75 mL (75 mL Intravenous Given  8/20/24 1215)   aspirin tablet 325 mg (325 mg Oral Given 8/20/24 1338)   amoxicillin-clavulanate (AUGMENTIN) 875-125 mg per tablet 1 tablet (1 tablet Oral Given 8/21/24 1000)   Gadobutrol injection (SINGLE-DOSE) SOLN 6 mL (6 mL Intravenous Given 8/21/24 0926)   Gadobutrol injection (SINGLE-DOSE) SOLN 6 mL (6 mL Intravenous Given 8/22/24 1620)       Diagnostic Studies  Results Reviewed       Procedure Component Value Units Date/Time    Hemoglobin A1c w/EAG Estimation [[id number]] Collected: 08/21/24 0529    Lab Status: Final result Specimen: Blood from Arm, Right Updated: 08/21/24 1145     Hemoglobin A1C 5.3 %       mg/dl     Lipid Panel with Direct LDL reflex [[id number]]  (Normal) Collected: 08/21/24 0529    Lab Status: Final result Specimen: Blood from Arm, Right Updated: 08/21/24 0611     Cholesterol 175 mg/dL      Triglycerides 93 mg/dL      HDL, Direct 94 mg/dL      LDL Calculated 62 mg/dL     TSH, 3rd generation with Free T4 reflex [[id number]]  (Normal) Collected: 08/20/24 1107    Lab Status: Final result Specimen: Blood from Arm, Right Updated: 08/20/24 1449     TSH 3RD GENERATON 1.041 uIU/mL     Comprehensive metabolic panel [[id number]]  (Abnormal) Collected: 08/20/24 1107    Lab Status: Final result Specimen: Blood from Arm, Right Updated: 08/20/24 1131     Sodium 137 mmol/L      Potassium 3.8 mmol/L      Chloride 104 mmol/L      CO2 26 mmol/L      ANION GAP 7 mmol/L      BUN 10 mg/dL      Creatinine 0.76 mg/dL      Glucose 108 mg/dL      Calcium 9.0 mg/dL      AST 13 U/L      ALT 16 U/L      Alkaline Phosphatase 28 U/L      Total Protein 6.7 g/dL      Albumin 4.0 g/dL      Total Bilirubin 0.48 mg/dL      eGFR 90 ml/min/1.73sq m     Narrative:      [employer] guidelines for Chronic Kidney Disease (CKD):     Stage 1 with normal or high GFR (GFR > 90 mL/min/1.73 square meters)    Stage 2 Mild CKD (GFR = 60-89 mL/min/1.73 square meters)    Stage 3A Moderate CKD (GFR = 45-59  mL/min/1.73 square meters)    Stage 3B Moderate CKD (GFR = 30-44 mL/min/1.73 square meters)    Stage 4 Severe CKD (GFR = 15-29 mL/min/1.73 square meters)    Stage 5 End Stage CKD (GFR <15 mL/min/1.73 square meters)  Note: GFR calculation is accurate only with a steady state creatinine    Protime-INR [[id number]]  (Normal) Collected: 08/20/24 1107    Lab Status: Final result Specimen: Blood from Arm, Right Updated: 08/20/24 1129     Protime 12.7 seconds      INR 0.88    Narrative:      INR Therapeutic Range    Indication                                             INR Range      Atrial Fibrillation                                               2.0-3.0  Hypercoagulable State                                    2.0.2.3  Left Ventricular Asist Device                            2.0-3.0  Mechanical Heart Valve                                  -    Aortic(with afib, MI, embolism, HF, LA enlargement,    and/or coagulopathy)                                     2.0-3.0 (2.5-3.5)     Mitral                                                             2.5-3.5  Prosthetic/Bioprosthetic Heart Valve               2.0-3.0  Venous thromboembolism (VTE: VT, PE        2.0-3.0    APTT [[id number]]  (Normal) Collected: 08/20/24 1107    Lab Status: Final result Specimen: Blood from Arm, Right Updated: 08/20/24 1129     PTT 23 seconds     CBC and differential [[id number]] Collected: 08/20/24 1107    Lab Status: Final result Specimen: Blood from Arm, Right Updated: 08/20/24 1129     WBC 7.79 Thousand/uL      RBC 4.42 Million/uL      Hemoglobin 13.5 g/dL      Hematocrit 40.6 %      MCV 92 fL      MCH 30.5 pg      MCHC 33.3 g/dL      RDW 13.6 %      MPV 10.3 fL      Platelets 196 Thousands/uL      nRBC 0 /100 WBCs      Segmented % 72 %      Immature Grans % 1 %      Lymphocytes % 18 %      Monocytes % 6 %      Eosinophils Relative 2 %      Basophils Relative 1 %      Absolute Neutrophils 5.77 Thousands/µL      Absolute Immature Grans 0.04  Thousand/uL      Absolute Lymphocytes 1.38 Thousands/µL      Absolute Monocytes 0.44 Thousand/µL      Eosinophils Absolute 0.12 Thousand/µL      Basophils Absolute 0.04 Thousands/µL     Fingerstick Glucose (POCT) [[id number]]  (Normal) Collected: 08/20/24 1036    Lab Status: Final result Specimen: Blood Updated: 08/20/24 1040     POC Glucose 119 mg/dl                    MRI thoracic spine w wo contrast   Final Result by Stepan Mustafa MD (08/22 1759)      No interval change. No significant canal stenosis or foraminal narrowing.      Stable mild prominence of the central canal in the mid thoracic cord. No abnormal enhancement is identified within the thoracic spinal canal.      Workstation performed: [id number]         MRI brachial plexus wo w contrast   Final Result by Stepan Mustafa MD (08/22 1811)      No discrete edema or enhancement outside along the course of the right brachial plexus.      Small incidental lobulated T2 hyperintense lesion with enhancement involving the right dorsal chest wall may represent an incidental hemangioma. Consider follow-up with MRI of the chest wall to ensure stability and exclude other etiologies.         Workstation performed: [id number]         MRI cervical spine w wo contrast   Final Result by Fer Marrero DO (08/21 1022)      Cervical degenerative disc disease most prominent at the C3-4 and C6-7 levels. At C6-7 there is stable right paramedian disc/osteophyte hypertrophic change and uncinate joint hypertrophic changes resulting in mild canal stenosis and foraminal narrowing.    There is foraminal narrowing at C3-4 which is new compared to the prior examination.               Workstation performed: [id number]         MRI brain w wo contrast   Final Result by Fer Marrero DO (08/21 1017)      No acute intracranial abnormality.      A few small white matter hyperintensities on T2 and FLAIR imaging within the periphery of the cerebral hemispheres may represent mild  "early chronic microangiopathic change.      Workstation performed: [id number]         CTA head and neck with and without contrast   Final Result by Collins Weaver MD (08/20 1314)      CT head:   -No acute vascular territory infarct, intracranial hemorrhage or mass effect. If there is persistent symptoms recommend MRI of the brain for further evaluation.      CTA head:   -No large vessel occlusion, high-grade stenosis or aneurysm.      CTA neck:   Right brachiocephalic and proximal left common carotid artery cannot be evaluated. Streak artifact limits evaluation of the distal V2 segments of the vertebral arteries. Otherwise:   -No high-grade stenosis or dissection or aneurysm.      Other:   -Sequelae of right thyroidectomy. Subcentimeter left thyroid lobe hypodensity. Correlate with thyroid ultrasound.               Workstation performed: [id number]                    Procedures  Procedures         ED Course                                               Medical Decision Making  Patient presents with right sided weakness and tremors that started 1 to 2 weeks ago.  No new symptoms over the last 24 hours, therefore stroke alert was not called.  She did have objective weakness in the right upper extremity.  CTA without evidence of large vessel occlusion, high-grade stenosis or intracranial hemorrhage.  Will hospitalize for further workup and treatment.    Portions of the above record have been created with voice recognition software.  Occasional wrong word or \"sound alike\" substitutions may have occurred due to the inherent limitations of voice recognition software.  Read the chart carefully and recognize, using context, where substitutions may have occurred.      Problems Addressed:  Right sided weakness:     Details: Possible central process versus cervical radiculopathy.  Will hospitalize for further workup and treatment.  Consult neurology as inpatient.  Tremor:     Details: Consult neurology as inpatient.    Amount " and/or Complexity of Data Reviewed  External Data Reviewed: notes.  Labs: ordered.  Radiology: ordered.    Risk  OTC drugs.  Prescription drug management.  Decision regarding hospitalization.                 Disposition  Final diagnoses:   Right sided weakness   Tremor     Time reflects when diagnosis was documented in both MDM as applicable and the Disposition within this note       Time User Action Codes Description Comment    8/20/2024  1:54 PM Isra Owensn L Add [R53.1] Right sided weakness     8/20/2024  1:54 PM Roman Ezoi L Add [R25.1] Tremor     8/20/2024  1:54 PM Owens Ezio L Add [R23.3] Easy bruising     8/20/2024  1:54 PM Roman Ezio L Remove [R23.3] Easy bruising           ED Disposition       ED Disposition   Admit    Condition   Stable    Date/Time   Tue Aug 20, 2024  1:54 PM    Comment   Case was discussed with CLEMENTINA and the patient's admission status was agreed to be Admission Status: observation status to the service of Dr. Dhaliwal.               Follow-up Information    None         Discharge Medication List as of 8/22/2024  6:56 PM        CONTINUE these medications which have NOT CHANGED    Details   albuterol (PROVENTIL HFA,VENTOLIN HFA) 90 mcg/act inhaler Inhale 2 puffs every 6 (six) hours as needed for wheezing, Starting Mon 4/29/2024, Normal      ALPRAZolam (XANAX) 0.25 mg tablet take 1 tablet by mouth twice a day if needed for anxiety, Normal      etonogestrel-ethinyl estradiol (NuvaRing) 0.12-0.015 MG/24HR vaginal ring Insert vaginally and leave in place for 3 consecutive weeks, then remove for 1 week., Normal      traZODone (DESYREL) 100 mg tablet take 3 tablets by mouth daily at bedtime, Normal           STOP taking these medications       amoxicillin-clavulanate (AUGMENTIN) 875-125 mg per tablet Comments:   Reason for Stopping:         pantoprazole (PROTONIX) 40 mg tablet Comments:   Reason for Stopping:               Outpatient Discharge Orders   Ambulatory referral to  Neurology   Standing Status: Future Standing Exp. Date: 08/22/25      Ambulatory referral to Physical Therapy   Standing Status: Future Standing Exp. Date: 08/22/25      Discharge Diet     Activity as tolerated       PDMP Review         Value Time User    PDMP Reviewed  Yes 8/7/2024  7:15 PM Lyla Sparks MD            ED Provider  Electronically Signed by             Ezio Owens,   08/20/24 1717       Ezio Owens,   08/24/24 0024

## 2024-08-20 NOTE — CONSULTS
NEUROLOGY RESIDENCY CONSULT NOTE   Name: Emely Thibodeaux   Age & Sex: 53 y.o. female   MRN: [id number]  Unit/Bed#: S -01   Encounter: [id number]  Length of Stay: 0    Emely Thibodeaux will need follow up in in 6 weeks with general AP/attending this should be after her outpatient EMG is obtained which is currently scheduled for 9/18/2024.   Pending for discharge: Imaging studies, stroke workup  ASSESSMENT & PLAN   #RIGHT SIDED MOTOR DYSFUNCTION  53-year-old left-hand-dominant female with PMH of right shoulder arthritis with subacromial bursa and R rotator cuff repair, bilateral carpal tunnel syndrome R >L, severe first  arthritis, tear of ulnar collateral ligament of the first metacarpophalangeal joint at proximal phalanx insertion with severe 1st carpometacarpal joint osteoarthritis, syrinx extending from T4-T7, thyroid goiter status post R thyroidectomy, anxiety, insomnia Pedrito-Parkinson-White syndrome, and recent bilateral acute otitis media.     3-week history of progressive motor disturbance of the right upper extremity consisting of reported decreased dexterity, bradykinetic movements, restricted ROM upon repetitive activation, fine motor impairment, and difficulty with  strength. Asc involuntary action and posturing tremor isolated to the RUE. Noted focal muscle atrophy isolated to that extremity of unclear chronicity. Also identified incoordination of the right lower extremity with difficulty putting her shoes on. Occasional associated muscle cramping. No associated sensory component, associated pain or recent trauma. Also concern for mild facial asymmetry with R facial droop and speech impairment with slurring and articulation errors that developed over the past 2 days PTA.    Impression: Unclear etiology of patient's reported motor dysfunction and questionable facial asymmetry and speech disturbance. It is unclear exact timeframe of her symptomatology as she has complained of similar motor  impairment of the extremity in the past. Does have a history of cervical radiculopathy and shoulder arthropathy with rotator cuff repair. There is concern for stroke as a cause of her clinical presentation by ED provider however have a low clinical suspicion at this time for acute ischemic event. Her symptoms do not have a clear localization. Atypical for peripheral nerve pathology with absence of associated sensory dysfunction. Given her prior spinal hx will obtain C spine imaging to assess for upper cord pathology.    Plan:  Continue on stroke pathway at this time, discontinue if MRI brain negative for ischemic stroke  Monitor on telemetry  BP goals: Normotension given symptoms greater than 24 hours  AP: Started on aspirin  Statin: Atorvastatin 40 mg QHS started  Imaging: MRI brain with and without contrast, MRI C-spine with and without contrast  Will obtain neuropathy labs - A1c, JALEESA, RF, ANCA, SPEP, B12, MMA, B6, CRP, ESR, Lyme  Outpatient EMG scheduled for 9/18/24  PT OT  Remainder of care per primary team appreciated  SUBJECTIVE   Reason for Consult / Principal Problem: Right-sided motor dysfunction  Hx and PE limited by: None    HPI: 53-year-old left-hand-dominant female with aertinent PMH right shoulder arthritis with subacromial bursa and R rotator cuff repair, bilateral carpal tunnel syndrome R >L, severe first  arthritis, tear of ulnar collateral ligament of the first metacarpophalangeal joint at proximal phalanx insertion with severe 1st carpometacarpal joint osteoarthritis, syrinx extending from T4-T7, thyroid goiter status post R thyroidectomy, anxiety, insomnia Pedrito-Parkinson-White syndrome, and recent bilateral acute otitis media. Neurology consulted for concern of possible stroke.    Patient notes progressive 3-week history right upper extremity motor dysfunction with reduced dexterity and difficulty performing test requiring precise and movements. Notes the development of RUE involuntary tremors  occurring with action and certain postural positioning such as typing on a keyboard. Also endorsing slowed motility and difficulty carrying out certain movements and carrying out motor tasks such as opening jars or tying her shoes. Noted to have limited range of motion of her fingers when attempting to open and close her hands with increasing difficulty with repetitive actions. Notes initially there was associated medial elbow pain but resolved spontaneously after a few days. She then subsequently found herself having difficulty with her right lower extremity motor function with increased difficulty putting on sandals with her right foot. Does not endorse any associated paresthesias, numbness, tingling, or any sensory component. Notes occasional associated muscle pain. Denies any identifiable provoking factors but does state symptoms are these prominent in the morning with subsequent worsening throughout the day. Denies any recent trauma. Noted to have vertiginous dizziness approximately 2 weeks ago provoked when laying supine lasting approximately 5 to 10 seconds with associated bilateral aural discomfort and muffled hearing which she was evaluated by her PCP and given Dx of bilateral acute otitis media and treated with Augmentin and steroids with improvement. Otherwise patient denies any recent sicknesses or travel.     On 7/26/2024 patient evaluated by outpatient Ortho for right shoulder pain. Felt symptoms consistent with rotator cuff tendinitis and exam documented without motor weakness given previous rotator cuff repair and received corticosteroid injection. It was following that visit in which she developed the aforementioned motor symptomatology. On 8/15/2024 seen once again at outpatient Ortho office given new motor impairment at that provider felt that patient likely had component of carpal and cubital tunnel syndrome but noted concern for superimposed neurologic dysfunction EMG and neurology ambulatory  referral was placed. Orthopedic provider also identified focal muscle atrophy of the right arm that patient has not previously appreciated. Over the course of the past 2 days family raise concern for possible facial asymmetry with right sided droopiness as well as occasional disturbances with articulation difficulties and slurred speech. She then decided to present to the ED on 8/20/2024 for evaluation of symptoms.     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 Notes frustration with working with [employer]. States that her increased anxiety has resulted worsening baseline insomnia over the last few weeks requiring increased dosing of trazodone to aid in sleep induction. Denies any other new or change in medication regimen. Endorses new onset easy bruising specifically over the posterior aspect of the right forearm. Notes ecchymosis sustained from slight pressure to the area such as carrying her purse or her arm. Denies this prior episodes of bruising easily. No history of known coagulation disorders, thrombocytopenia, vitamin deficiencies, medication such as antiplatelets or anticoagulants.     Noted prior history of cervical radiculopathy and lumbar disc disease. Prior lower back pain with noted resolution from nerve ablations. Also had prior spinal cord imaging with incidental intramedullary cyst from T4-T7 consistent with syrinx. Had serial MR studies with stability but also follow-up. EMG obtained several years ago diagnosed bilateral CTS (R greater than L). At that time it was recommended that she had carpal tunnel release surgery however decided to trial orthotic bracing with noted resolution of symptoms. She ensures she has never had symptoms like this in the past. Denies prior right upper extremity weakness however per chart review has reported on multiple occasions dating back to 2018 of difficulty with  strength most notable with opening jars.      In the ED initial VS T90 8 °F, HR 95, RR 16, /95, SpO2 99% RA. Lab work consisting of CMP, CBC, PT INR, PTT, TSH all within normal limits. ED provider evaluation with documented right-sided upper and lower extremity weakness with right facial droop concerning for possible acute ischemic infarct. CT imaging obtained and no acute vascular territory infarct, intracranial hemorrhage, or mass effect was seen on NC CTH. Vessel imaging consisting of CTA H/N with limited visualization of the R brachiocephalic and proximal L, carotid artery due to streak artifact. Limited evaluation of the distal V2 segments of the vertebral arteries. No evidence of high-grade stenosis or dissection or aneurysm in the head/neck vasculature. admitted to the medical service for stroke rule out. She was loaded with aspirin 325 mg and started on 81 mg QD. Initiated on atorvastatin 40 mg QHS. She was placed on stroke pathway and neurology consulted.    Inpatient consult to Neurology  Consult performed by: Fausto Newby DO  Consult ordered by: Isabella Rubalcava MD        Historical Information   Past Medical History:   Diagnosis Date    Anxiety     Arthritis     Chronic pain     Neck. Numbness in fingers    Colon polyp     CTS (carpal tunnel syndrome)     Right    DDD (degenerative disc disease), cervical     DDD (degenerative disc disease), lumbar     Disease of thyroid gland     History of COVID-19     History of echocardiogram 11/26/2012    EF 55%, NWMA, Normal    Insomnia     Orbital floor (blow-out) closed fracture (HCC) 01/20/2011    Palpitations     Pneumonia 2008    WPW (Pedrito-Parkinson-White syndrome)      Past Surgical History:   Procedure Laterality Date    AUGMENTATION MAMMAPLASTY Bilateral 2010    AUGMENTATION MAMMAPLASTY Bilateral 04/01/2022    redone    BREAST SURGERY Bilateral     Augmentation    EPIDURAL BLOCK INJECTION N/A 05/03/2018    Procedure: CERVICAL EPIDURAL STEROID INJECTION;  Surgeon: Kye  MD Darin;  Location: [facility];  Service: Pain Management     OH DSTR NROLYTC AGNT PARVERTEB FCT SNGL LMBR/SACRAL Right 12/15/2016    Procedure: LUMBAR RADIOFREQUENCY LESIONING ;  Surgeon: Kye Webster MD;  Location: [facility];  Service: Pain Management     OH NJX DX/THER AGT PVRT FACET JT CRV/THRC 1 LEVEL Right 2018    Procedure: C4-5 C5-6 C7-T1 CERVICAL MEDIAL BRANCH BLOCK;  Surgeon: Kye Webster MD;  Location: [facility];  Service: Pain Management     ROTATOR CUFF REPAIR Right     THYROIDECTOMY, PARTIAL      TRANSUMBILICAL AUGMENTATION MAMMAPLASTY Bilateral      Social History   Social History     Substance and Sexual Activity   Alcohol Use Not Currently    Alcohol/week: 1.0 standard drink of alcohol    Types: 1 Cans of beer per week    Comment: few times week     Social History     Substance and Sexual Activity   Drug Use No     E-Cigarette/Vaping    E-Cigarette Use Never User      E-Cigarette/Vaping Substances    Nicotine No     THC No     CBD No     Flavoring No     Other No      Social History     Tobacco Use   Smoking Status Former    Current packs/day: 0.00    Average packs/day: 0.3 packs/day for 10.0 years (2.5 ttl pk-yrs)    Types: Cigarettes    Start date: 2008    Quit date: 2018    Years since quittin.3   Smokeless Tobacco Never     Family History:   Family History   Problem Relation Age of Onset    Endometrial cancer Mother 70    Arthritis Father     No Known Problems Sister     No Known Problems Daughter     No Known Problems Daughter     Lung cancer Maternal Grandmother     No Known Problems Paternal Grandmother     Lung cancer Maternal Aunt     Breast cancer Maternal Aunt 50    Breast cancer Maternal Aunt 58    No Known Problems Maternal Aunt     Cancer Paternal Aunt     Colon cancer Paternal Aunt 58    Breast cancer Paternal Aunt 45    Cancer Paternal Aunt     No Known Problems Paternal Aunt     Thyroid cancer Cousin      Meds/Allergies   all current active meds have been  reviewed, current meds:   Current Facility-Administered Medications   Medication Dose Route Frequency    ALPRAZolam (XANAX) tablet 0.25 mg  0.25 mg Oral BID PRN    amoxicillin-clavulanate (AUGMENTIN) 875-125 mg per tablet 1 tablet  1 tablet Oral Q12H MUKESH    [START ON 8/21/2024] aspirin chewable tablet 81 mg  81 mg Oral Daily    atorvastatin (LIPITOR) tablet 40 mg  40 mg Oral QPM    heparin (porcine) subcutaneous injection 5,000 Units  5,000 Units Subcutaneous Q8H MUKESH    traZODone (DESYREL) tablet 300 mg  300 mg Oral HS   , and PTA meds:   Prior to Admission Medications   Prescriptions Last Dose Informant Patient Reported? Taking?   ALPRAZolam (XANAX) 0.25 mg tablet   No No   Sig: take 1 tablet by mouth twice a day if needed for anxiety   albuterol (PROVENTIL HFA,VENTOLIN HFA) 90 mcg/act inhaler  Self No No   Sig: Inhale 2 puffs every 6 (six) hours as needed for wheezing   amoxicillin-clavulanate (AUGMENTIN) 875-125 mg per tablet   No No   Sig: Take 1 tablet by mouth every 12 (twelve) hours for 7 days   etonogestrel-ethinyl estradiol (NuvaRing) 0.12-0.015 MG/24HR vaginal ring  Self No No   Sig: Insert vaginally and leave in place for 3 consecutive weeks, then remove for 1 week.   pantoprazole (PROTONIX) 40 mg tablet Not Taking Self No No   Sig: take 1 tablet by mouth daily   Patient not taking: Reported on 8/20/2024   traZODone (DESYREL) 100 mg tablet  Self No No   Sig: take 3 tablets by mouth daily at bedtime      Facility-Administered Medications: None     Allergies   Allergen Reactions    Sulfa Antibiotics Hives       Review of previous medical records was completed.  Review of Systems   Constitutional:  Negative for activity change, appetite change, fatigue, fever and unexpected weight change.   HENT:  Positive for hearing loss (Muffled hearing bilaterally in the setting of recent ear infection). Negative for tinnitus.    Eyes:  Negative for photophobia and visual disturbance.   Respiratory:  Negative for  shortness of breath.    Cardiovascular:  Negative for chest pain and palpitations.   Gastrointestinal:  Negative for constipation and diarrhea.   Endocrine: Negative for cold intolerance and heat intolerance.   Skin:         Bruising   Neurological:  Positive for tremors, facial asymmetry, speech difficulty and weakness. Negative for dizziness, seizures, syncope, light-headedness, numbness and headaches.   Hematological:  Bruises/bleeds easily.   Psychiatric/Behavioral:  Positive for sleep disturbance. Negative for agitation and confusion. The patient is nervous/anxious.    All other systems reviewed and are negative.      OBJECTIVE     Patient ID: Emely Thibodeaux is a 53 y.o. female.    Vitals:   Vitals:    24 1330 24 1400 24 1500 24 1556   BP: 133/70 139/70 139/76 131/79   BP Location: Right arm Right arm Right arm    Pulse: 81 90 92 96   Resp: 18 18 18    Temp:    98.5 °F (36.9 °C)   TempSrc:       SpO2: 98% 97% 96% 96%   Weight:          Body mass index is 21.06 kg/m².     Intake/Output Summary (Last 24 hours) at 2024 1726  Last data filed at 2024 1556  Gross per 24 hour   Intake 200 ml   Output 300 ml   Net -100 ml       Temperature:   Temp (24hrs), Av.3 °F (36.8 °C), Min:98 °F (36.7 °C), Max:98.5 °F (36.9 °C)    Temperature: 98.5 °F (36.9 °C)    Invasive Devices:   Invasive Devices       Peripheral Intravenous Line  Duration             Peripheral IV 24 Left Antecubital <1 day                  GENERAL EXAM  Constitutional: Not in acute distress. Not ill-appearing, toxic-appearing or diaphoretic.   HENT: Normocephalic and atraumatic. Nose and Ears normal.   Eyes: No scleral icterus. No discharge.   Cardiovascular:  Distal extremities warm without palpable edema or tenderness, no observed significant swelling.   Pulmonary: Pulmonary effort is normal. Not in respiratory distress  Musculoskeletal: No swelling or deformity.  Psychiatric: Normal behavior and appropriate  affect     NEUROLOGIC  EXAM:  Mental Status: Alertness: alert, Orientation: time, date, person, place, city, president, Speech/Language normal rate, normal volume, normal pitch, fluent, coherent, articulation error, Commands: Can follow multistep commands  Cranial Nerves:  I Not tested   II Visual Fields normal   II, Ill,  IV, VI Pupils equal, round, reactive to light and accommodation  EOM full and intact   V facial sensation was normal and symmetrical   VII Subtle flattening of the right nasolabial fold, right orbital socket appears smaller on the right compared to the left   VIII Normal hearing to speech   IX, X Normal Palatal Elevation, No Uvular Deviation   XI Shoulder shrug and head turn is normal   XII Midline tongue protrusion   Motor: The right upper extremity has evidence of global muscle atrophy with mild increased tone at the wrist. Noted to have involuntary mixed action tremor with intention kinetic and postural components of the R hand and fingers. Noted to have bradykinetic motor function isolated to the right upper extremity.  Strength testing  Arm Right  Left Leg Right  Left   Deltoid 4/5 5/5 lliopsoas 5/5 5/5   Biceps 5/5 5/5 Quads 5/5 5/5   Triceps 5/5 5/5 Hamstrings 5/5 5/5   Wrist Extension 4+/5 5/5 Ankle Dorsi Flexion 5/5 5/5   Wrist Flexion 4/5 5/5 Ankle Plantar Flexion 5/5 5/5   Reflexes: No clonus, no Fox's, no cross abductors, toes down     BICEP   TRICEPS   BRACHIO   PATELLAR   ACHILLES   RIGHT 2+ 2+ 2+ 2+ 2+   LEFT 2+ 2+ 2+ 2+ 2+   Sensory: Normal light touch sensation, Normal pin prick sensation, Normal vibratory sensation, Normal temperature sensation, and Normal proprioception  Coordination: Cerebellar arm drift absent. Finger To Nose: Right Intact, Left Intact. Heel To Shin: Right Intact, Left Intact  Station/Gait: Deferred d/t medical severity  LABORATORY DATA    Labs: I have personally reviewed pertinent labs.    CBC:  Results from last 7 days   Lab Units 08/20/24  1107   WBC  Thousand/uL 7.79   RBC Million/uL 4.42   HEMOGLOBIN g/dL 13.5   HEMATOCRIT % 40.6   MCV fL 92   MCH pg 30.5   MCHC g/dL 33.3   RDW % 13.6   MPV fL 10.3   PLATELETS Thousands/uL 196   NRBC AUTO /100 WBCs 0   SEGS PCT % 72   LYMPHO PCT % 18   MONO PCT % 6   EOS PCT % 2   BASOS PCT % 1   TOTAL NEUT ABS Thousands/µL 5.77   LYMPHS ABS Thousands/µL 1.38   MONOS ABS Thousand/µL 0.44   EOS ABS Thousand/µL 0.12   ,   Chemistry Profile:   Results from last 7 days   Lab Units 08/20/24  1107   POTASSIUM mmol/L 3.8   CHLORIDE mmol/L 104   CO2 mmol/L 26   BUN mg/dL 10   CREATININE mg/dL 0.76   CALCIUM mg/dL 9.0   AST U/L 13   ALT U/L 16   ALK PHOS U/L 28*   EGFR ml/min/1.73sq m 90     Results from last 7 days   Lab Units 08/20/24  1107   GLUCOSE RANDOM mg/dL 108      Recent Labs     08/20/24  1036   POCGLU 119     PT/INR:   Lab Results   Component Value Date    INR 0.88 08/20/2024   ,   Hemoglobin A1c 5.3 (10/13/2022), LDL 84 (5/15/2024)    Micro: I have reviewed pertinent results.      Lab Results   Component Value Date    URINECX >100,000 cfu/ml Klebsiella pneumoniae (A) 03/30/2021     IMAGING STUDIES    Radiology Results: I have personally reviewed pertinent reports and reviewed films in PACS  CTA head and neck with and without contrast    Result Date: 8/20/2024  Impression: CT head: -No acute vascular territory infarct, intracranial hemorrhage or mass effect. If there is persistent symptoms recommend MRI of the brain for further evaluation. CTA head: -No large vessel occlusion, high-grade stenosis or aneurysm. CTA neck: Right brachiocephalic and proximal left common carotid artery cannot be evaluated. Streak artifact limits evaluation of the distal V2 segments of the vertebral arteries. Otherwise: -No high-grade stenosis or dissection or aneurysm. Other: -Sequelae of right thyroidectomy. Subcentimeter left thyroid lobe hypodensity. Correlate with thyroid ultrasound. Workstation performed: [id number]      Other Diagnostic  Results: I have personally reviewed pertinent reports  Results for orders placed during the hospital encounter of 07/17/24    Echo complete w/ contrast if indicated    Interpretation Summary    Left Ventricle: Left ventricular cavity size is normal. Wall thickness is normal. The left ventricular ejection fraction is 55%. Systolic function is normal. Wall motion is normal. Diastolic function is normal.    Right Ventricle: Right ventricular cavity size is normal. Systolic function is normal.    Tricuspid Valve: There is mild regurgitation.    No results found for this or any previous visit (from the past 4464 hour(s)).     ACTIVE MEDICATIONS   Scheduled PRN   amoxicillin-clavulanate, 1 tablet, Q12H MUKESH  [START ON 8/21/2024] aspirin, 81 mg, Daily  atorvastatin, 40 mg, QPM  heparin (porcine), 5,000 Units, Q8H MUKESH  traZODone, 300 mg, HS      ALPRAZolam, 0.25 mg, BID PRN       Continuous          Prior to Admission medications    Medication Sig Start Date End Date Taking? Authorizing Provider   albuterol (PROVENTIL HFA,VENTOLIN HFA) 90 mcg/act inhaler Inhale 2 puffs every 6 (six) hours as needed for wheezing 4/29/24   ROHIT Suazo   ALPRAZolam (XANAX) 0.25 mg tablet take 1 tablet by mouth twice a day if needed for anxiety 8/7/24   Lyla Sparks MD   amoxicillin-clavulanate (AUGMENTIN) 875-125 mg per tablet Take 1 tablet by mouth every 12 (twelve) hours for 7 days 8/13/24 8/20/24  Lucia Cardenas PA-C   etonogestrel-ethinyl estradiol (NuvaRing) 0.12-0.015 MG/24HR vaginal ring Insert vaginally and leave in place for 3 consecutive weeks, then remove for 1 week. 4/18/22   Rachelle Correa DO   pantoprazole (PROTONIX) 40 mg tablet take 1 tablet by mouth daily 4/1/24   Niki Lorenz PA-C   traZODone (DESYREL) 100 mg tablet take 3 tablets by mouth daily at bedtime 5/27/24   ROHIT Suazo       VTE Mechanical Prophylaxis: Sequential Compression Device  VTE Pharmacologic Prophylaxis: VTE  covered by:  heparin (porcine), Subcutaneous      CODE STATUS & ADVANCED DIRECTIVES   Code Status: No Order  Advance Directive and Living Will:      Power of :    POLST:    ====================================================  Fausto Newby, DO [facility]'s Neurology Residency, PGY-3

## 2024-08-20 NOTE — CASE MANAGEMENT
Case Management Assessment    Patient name Emely Thibodeaux  Location ED-29/ED-29 MRN [id number]  : 1970 Date 2024       Current Admission Date: 2024  Current Admission Diagnosis:Stroke-like symptoms   Patient Active Problem List    Diagnosis Date Noted Date Diagnosed    Annual physical exam 2024     Gastroesophageal reflux disease without esophagitis 2024     Benzodiazepine use agreement exists 10/13/2022     Insomnia 2021     Vitamin D deficiency 2021     WPW (Pedrito-Parkinson-White syndrome) 2019     Anxiety 2019     Osteoarthritis of carpometacarpal (CMC) joint of thumb 2018     Thyroid nodule 10/31/2017     Thyroid disorder 2015       LOS (days): 0  Geometric Mean LOS (GMLOS) (days):   Days to GMLOS:     OBJECTIVE:              Current admission status: Emergency       Preferred Pharmacy:   [id number] - [place], [place] - [address] [address]  Phone: [phone] Fax: [phone]    Primary Care Provider: ROHIT Suazo    Primary Insurance: [employer]  Secondary Insurance: [employer]    ASSESSMENT:  Active Health Care Proxies    There are no active Health Care Proxies on file.                 Readmission Root Cause  30 Day Readmission: No    Patient Information  During Assessment patient was accompanied by:  (Marc)  Assessment information provided by:: Patient, Spouse  Primary Caregiver: Self  Support Systems: Self, Spouse/significant other, Children  What city do you live in?: [place]  Home entry access options. Select all that apply.: Stairs  Number of steps to enter home.: 4  Do the steps have railings?: No  Type of Current Residence: Other (Comment) ([place] - 3 floors: Basement, 1st floor, 2nd floor)  Living Arrangements: Lives w/ Spouse/significant other  Is patient a ?: No    Activities of Daily Living Prior to Admission  Functional Status:  Independent  Completes ADLs independently?: Yes  Ambulates independently?: Yes  Does patient use assisted devices?: No  Does patient currently own DME?: No  Does patient have a history of Outpatient Therapy (PT/OT)?: Yes  Does the patient have a history of Short-Term Rehab?: No  Does patient have a history of HHC?: No  Does patient currently have HHC?: No         Patient Information Continued  Income Source: Self-employed  Does patient have prescription coverage?: Yes  Does patient receive dialysis treatments?: No  Does patient have a history of substance abuse?: No  Does patient have a history of Mental Health Diagnosis?: No         Means of Transportation  Means of Transport to Appts:: Drives Self      Social Determinants of Health (SDOH)      Flowsheet Row Most Recent Value   Housing Stability    In the last 12 months, was there a time when you were not able to pay the mortgage or rent on time? N   In the past 12 months, how many times have you moved where you were living? 0   At any time in the past 12 months, were you homeless or living in a shelter (including now)? N   Transportation Needs    In the past 12 months, has lack of transportation kept you from medical appointments or from getting medications? no   In the past 12 months, has lack of transportation kept you from meetings, work, or from getting things needed for daily living? No   Food Insecurity    Within the past 12 months, you worried that your food would run out before you got the money to buy more. Never true   Within the past 12 months, the food you bought just didn't last and you didn't have money to get more. Never true   Utilities    In the past 12 months has the electric, gas, oil, or water company threatened to shut off services in your home? No

## 2024-08-20 NOTE — ASSESSMENT & PLAN NOTE
Presents with 2-week duration of right upper extremity and right lower extremity weakness, upper extremity weakness greater than lower extremity, associated with tremors in the upper and lower extremities, right lower extremity predominantly twitching.  Symptoms not getting any better and hence presented to the emergency department.  CTA head and neck in the emergency department no acute infarct, occlusion.  Patient will be admitted for further stroke workup including MRI brain, lipid panel, A1c, TSH, neurology evaluation.  Patient has received 325 mg of aspirin in the emergency department, will start on 81 mg daily.  Start on atorvastatin from [facility].    Stroke alert was not called as patient had symptoms for almost 2 weeks, not a candidate for TNK due to symptom duration of 2 weeks.

## 2024-08-20 NOTE — ASSESSMENT & PLAN NOTE
History of WPW, palpitations, patient seen cardiology in July 2024 and underwent echocardiogram, exercise stress test which came back within normal limit.  Avoid AV monica blocking agents.

## 2024-08-21 ENCOUNTER — APPOINTMENT (OUTPATIENT)
Dept: MRI IMAGING | Facility: HOSPITAL | Age: 54
End: 2024-08-21
Payer: COMMERCIAL

## 2024-08-21 PROBLEM — R53.1 RIGHT SIDED WEAKNESS: Status: ACTIVE | Noted: 2024-08-20

## 2024-08-21 LAB
ANA SER QL IA: NEGATIVE
ATRIAL RATE: 87 BPM
B BURGDOR IGG+IGM SER QL IA: NEGATIVE
CHOLEST SERPL-MCNC: 175 MG/DL
EST. AVERAGE GLUCOSE BLD GHB EST-MCNC: 105 MG/DL
FOLATE SERPL-MCNC: 10.4 NG/ML
HBA1C MFR BLD: 5.3 %
HDLC SERPL-MCNC: 94 MG/DL
LDLC SERPL CALC-MCNC: 62 MG/DL (ref 0–100)
P AXIS: 67 DEGREES
PR INTERVAL: 122 MS
QRS AXIS: 76 DEGREES
QRSD INTERVAL: 84 MS
QT INTERVAL: 342 MS
QTC INTERVAL: 411 MS
RHEUMATOID FACT SER QL LA: NEGATIVE
T WAVE AXIS: 67 DEGREES
TRIGL SERPL-MCNC: 93 MG/DL
VENTRICULAR RATE: 87 BPM
VIT B12 SERPL-MCNC: 312 PG/ML (ref 180–914)

## 2024-08-21 PROCEDURE — 99233 SBSQ HOSP IP/OBS HIGH 50: CPT | Performed by: STUDENT IN AN ORGANIZED HEALTH CARE EDUCATION/TRAINING PROGRAM

## 2024-08-21 PROCEDURE — 86430 RHEUMATOID FACTOR TEST QUAL: CPT

## 2024-08-21 PROCEDURE — 80061 LIPID PANEL: CPT | Performed by: INTERNAL MEDICINE

## 2024-08-21 PROCEDURE — 83918 ORGANIC ACIDS TOTAL QUANT: CPT

## 2024-08-21 PROCEDURE — 82746 ASSAY OF FOLIC ACID SERUM: CPT

## 2024-08-21 PROCEDURE — 93010 ELECTROCARDIOGRAM REPORT: CPT | Performed by: INTERNAL MEDICINE

## 2024-08-21 PROCEDURE — 97162 PT EVAL MOD COMPLEX 30 MIN: CPT

## 2024-08-21 PROCEDURE — 72156 MRI NECK SPINE W/O & W/DYE: CPT

## 2024-08-21 PROCEDURE — 84207 ASSAY OF VITAMIN B-6: CPT

## 2024-08-21 PROCEDURE — 83520 IMMUNOASSAY QUANT NOS NONAB: CPT

## 2024-08-21 PROCEDURE — A9585 GADOBUTROL INJECTION: HCPCS | Performed by: HOSPITALIST

## 2024-08-21 PROCEDURE — 99232 SBSQ HOSP IP/OBS MODERATE 35: CPT | Performed by: PHYSICIAN ASSISTANT

## 2024-08-21 PROCEDURE — 86038 ANTINUCLEAR ANTIBODIES: CPT

## 2024-08-21 PROCEDURE — 86235 NUCLEAR ANTIGEN ANTIBODY: CPT

## 2024-08-21 PROCEDURE — 86037 ANCA TITER EACH ANTIBODY: CPT

## 2024-08-21 PROCEDURE — 83036 HEMOGLOBIN GLYCOSYLATED A1C: CPT | Performed by: INTERNAL MEDICINE

## 2024-08-21 PROCEDURE — 97166 OT EVAL MOD COMPLEX 45 MIN: CPT

## 2024-08-21 PROCEDURE — 84165 PROTEIN E-PHORESIS SERUM: CPT

## 2024-08-21 PROCEDURE — 70553 MRI BRAIN STEM W/O & W/DYE: CPT

## 2024-08-21 PROCEDURE — 86618 LYME DISEASE ANTIBODY: CPT

## 2024-08-21 RX ORDER — ACETAMINOPHEN 325 MG/1
650 TABLET ORAL EVERY 6 HOURS PRN
Status: DISCONTINUED | OUTPATIENT
Start: 2024-08-21 | End: 2024-08-22 | Stop reason: HOSPADM

## 2024-08-21 RX ORDER — GADOBUTROL 604.72 MG/ML
6 INJECTION INTRAVENOUS
Status: COMPLETED | OUTPATIENT
Start: 2024-08-21 | End: 2024-08-21

## 2024-08-21 RX ADMIN — ASPIRIN 81 MG 81 MG: 81 TABLET ORAL at 10:00

## 2024-08-21 RX ADMIN — ACETAMINOPHEN 650 MG: 325 TABLET, FILM COATED ORAL at 20:25

## 2024-08-21 RX ADMIN — ALPRAZOLAM 0.25 MG: 0.25 TABLET ORAL at 21:30

## 2024-08-21 RX ADMIN — GADOBUTROL 6 ML: 604.72 INJECTION INTRAVENOUS at 09:26

## 2024-08-21 RX ADMIN — TRAZODONE HYDROCHLORIDE 300 MG: 100 TABLET ORAL at 21:29

## 2024-08-21 RX ADMIN — AMOXICILLIN AND CLAVULANATE POTASSIUM 1 TABLET: 875; 125 TABLET, FILM COATED ORAL at 10:00

## 2024-08-21 NOTE — ASSESSMENT & PLAN NOTE
Presents with 2-week duration of right upper extremity and right lower extremity weakness, upper extremity weakness greater than lower extremity, associated with tremors in the upper and lower extremities, right lower extremity predominantly twitching.    Also noted temperature change in the RUE (intermittent coolness)  Some swallowing difficulties   Speech difficulties noted   Symptoms not getting any better and hence presented to the emergency department.  MRI Brain negative for CVA, mass, or demyelination   MRI C-Spine with mild right sided stenosis, doubt etiology  Autoimmune work up pending  ?Myastenia ?Thoracic Outlet Syndrome (would not explain speech/swallowing problems)  Neurology following   Neuropathy labs pending

## 2024-08-21 NOTE — PROGRESS NOTES
[facility]  Progress Note  Name: Emely Thibodeaux I  MRN: [id number]  Unit/Bed#: S -01 I Date of Admission: 8/20/2024   Date of Service: 8/21/2024 I Hospital Day: 0    Assessment & Plan   * Right sided weakness  Assessment & Plan  Presents with 2-week duration of right upper extremity and right lower extremity weakness, upper extremity weakness greater than lower extremity, associated with tremors in the upper and lower extremities, right lower extremity predominantly twitching.    Also noted temperature change in the RUE (intermittent coolness)  Some swallowing difficulties   Speech difficulties noted   Symptoms not getting any better and hence presented to the emergency department.  MRI Brain negative for CVA, mass, or demyelination   MRI C-Spine with mild right sided stenosis, doubt etiology  Autoimmune work up pending  ?Myastenia ?Thoracic Outlet Syndrome (would not explain speech/swallowing problems)  Neurology following   Neuropathy labs pending    WPW (Pedrito-Parkinson-White syndrome)  Assessment & Plan  History of WPW, palpitations, patient seen cardiology in July 2024 and underwent echocardiogram, exercise stress test which came back within normal limit.  Avoid AV monica blocking agents.    Anxiety  Assessment & Plan  Stable   Xanax at night as needed.    Insomnia  Assessment & Plan  Patient takes trazodone 300 mg daily at night.               VTE Pharmacologic Prophylaxis:   High Risk (Score >/= 5) - Pharmacological DVT Prophylaxis Ordered: heparin. Sequential Compression Devices Ordered.    Mobility:   Basic Mobility Inpatient Raw Score: 24  JH-HLM Goal: 8: Walk 250 feet or more  JH-HLM Achieved: 7: Walk 25 feet or more  JH-HLM Goal NOT achieved. Continue with multidisciplinary rounding and encourage appropriate mobility to improve upon JH-HLM goals.    Patient Centered Rounds: I performed bedside rounds with nursing staff today.   Discussions with Specialists or Other Care  "Team Provider: Discussed with Neurology, RN, CM    Education and Discussions with Family / Patient: Patient declined call to .     Total Time Spent on Date of Encounter in care of patient: 35 mins. This time was spent on one or more of the following: performing physical exam; counseling and coordination of care; obtaining or reviewing history; documenting in the medical record; reviewing/ordering tests, medications or procedures; communicating with other healthcare professionals and discussing with patient's family/caregivers.    Current Length of Stay: 0 day(s)  Current Patient Status: Observation   Certification Statement: The patient, admitted on an observation basis, will now require > 2 midnight hospital stay due to further MRIs, neurologic work up   Discharge Plan: Anticipate discharge in 24-48 hrs to home.    Code Status: No Order    Subjective:   Patient reports no change in her symptoms. Still having RUE weakness, decreased coordination, tremor. Still has difficulty swallowing at times. Reports that she does note that her RUE is cold at times. She states that sometimes her speech is like \"she is drunk and she knows she is drunk and she is trying to say a word right, but you know it won't come out right\".     Objective:     Vitals:   Temp (24hrs), Av.5 °F (36.9 °C), Min:98.4 °F (36.9 °C), Max:98.7 °F (37.1 °C)    Temp:  [98.4 °F (36.9 °C)-98.7 °F (37.1 °C)] 98.4 °F (36.9 °C)  HR:  [81-96] 82  Resp:  [18] 18  BP: (108-139)/(70-88) 108/79  SpO2:  [96 %-98 %] 98 %  Body mass index is 20.38 kg/m².     Input and Output Summary (last 24 hours):     Intake/Output Summary (Last 24 hours) at 2024 1324  Last data filed at 2024 0000  Gross per 24 hour   Intake 900 ml   Output 650 ml   Net 250 ml       Physical Exam:   Physical Exam  Constitutional:       General: She is not in acute distress.     Appearance: Normal appearance. She is normal weight. She is not ill-appearing or diaphoretic. "   HENT:      Head: Normocephalic and atraumatic.      Mouth/Throat:      Mouth: Mucous membranes are moist.   Eyes:      General: No scleral icterus.     Pupils: Pupils are equal, round, and reactive to light.   Cardiovascular:      Rate and Rhythm: Normal rate and regular rhythm.      Pulses: Normal pulses.      Heart sounds: Normal heart sounds, S1 normal and S2 normal. No murmur heard.     No systolic murmur is present.      No diastolic murmur is present.      No gallop. No S3 or S4 sounds.   Pulmonary:      [place]: Pulmonary effort is normal. No accessory muscle usage or respiratory distress.      Breath sounds: Normal breath sounds. No stridor. No decreased breath sounds, wheezing, rhonchi or rales.   Chest:      Chest wall: No tenderness.   Abdominal:      General: Bowel sounds are normal. There is no distension.      Palpations: Abdomen is soft.      Tenderness: There is no abdominal tenderness. There is no guarding.   Musculoskeletal:      Right lower leg: No edema.      Left lower leg: No edema.   Skin:     General: Skin is warm and dry.      Coloration: Skin is not jaundiced.   Neurological:      Mental Status: She is alert. Mental status is at baseline.      Motor: Weakness, tremor, atrophy and abnormal muscle tone present. No seizure activity.      Coordination: Coordination abnormal.   Psychiatric:         Behavior: Behavior is cooperative.          Additional Data:     Labs:  Results from last 7 days   Lab Units 08/20/24  1107   WBC Thousand/uL 7.79   HEMOGLOBIN g/dL 13.5   HEMATOCRIT % 40.6   PLATELETS Thousands/uL 196   SEGS PCT % 72   LYMPHO PCT % 18   MONO PCT % 6   EOS PCT % 2     Results from last 7 days   Lab Units 08/20/24  1107   SODIUM mmol/L 137   POTASSIUM mmol/L 3.8   CHLORIDE mmol/L 104   CO2 mmol/L 26   BUN mg/dL 10   CREATININE mg/dL 0.76   ANION GAP mmol/L 7   CALCIUM mg/dL 9.0   ALBUMIN g/dL 4.0   TOTAL BILIRUBIN mg/dL 0.48   ALK PHOS U/L 28*   ALT U/L 16   AST U/L 13   GLUCOSE  RANDOM mg/dL 108     Results from last 7 days   Lab Units 08/20/24  1107   INR  0.88     Results from last 7 days   Lab Units 08/20/24  1036   POC GLUCOSE mg/dl 119     Results from last 7 days   Lab Units 08/21/24  0529   HEMOGLOBIN A1C % 5.3           Lines/Drains:  Invasive Devices       Peripheral Intravenous Line  Duration             Peripheral IV 08/20/24 Left Antecubital 1 day                      Telemetry:  Telemetry Orders (From admission, onward)               24 Hour Telemetry Monitoring  Continuous x 24 Hours (Telem)        Expiring   Question:  Reason for 24 Hour Telemetry  Answer:  TIA/Suspected CVA/ Confirmed CVA                     Telemetry Reviewed: Normal Sinus Rhythm  Indication for Continued Telemetry Use: No indication for continued use. Will discontinue.              Imaging: Reviewed radiology reports from this admission including: MRI brain and MRI spine    Recent Cultures (last 7 days):         Last 24 Hours Medication List:   Current Facility-Administered Medications   Medication Dose Route Frequency Provider Last Rate    ALPRAZolam  0.25 mg Oral BID PRN Isabella Rubalcava MD      heparin (porcine)  5,000 Units Subcutaneous Q8H MUKESH Isabella Rubalcava MD      traZODone  300 mg Oral HS Isabella Rubalcava MD          Today, Patient Was Seen By: Haroldo Farrell PA-C    **Please Note: This note may have been constructed using a voice recognition system.**

## 2024-08-21 NOTE — PROGRESS NOTES
[facility]   NEUROLOGY RESIDENCY - PROGRESS NOTE  Note Date: 24  Note Time: 4:59 PM   Patient: Emely Thibodeaux  Age, : 53 y.o., 1970 MRN: [id number]  Code Status: No Order Patient Location: S /S -01 Hospital LOS:0 days    Emely Thibodeaux will need follow up in in 4 weeks with neuromuscular Attending only . 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 Neurology consulted for given concerns for possible stroke.    Pt initially developed RUE pain 1 month ago located to the shoulder and medial elbow with resolution after steroid injection. Shortly after developed RUE motor dysfunction consisting of difficulty performing fine motor tasks, discoordination, bradykinetic and restricted movements, involuntary kinetic/postural intention tremor, and muscle atrophy. Also endorsed subtle distal right lower extremity involvement of same onset. He also noted the past week and began bruising easily over the posterior aspect of her for right forearm. Family and patient then noticed speech disturbance with occasional slurring and articulation errors and questionable subtle right lower facial weakness which developed over the course of 2 days prior to arrival which prompted ED evaluation.     Workup:  NC CTH without acute vascular territory infarct, hemorrhage, or mass effect.  CTA H/N without LVO, high-grade stenosis or aneurysm.  Limited evaluation of the distal V2 segments of vertebral arteries. R brachiocephalic and proximal L CC artery cannot be evaluated. Otherwise no high-grade stenosis or dissection or aneurysm.  MRI brain + callie obtained which did not reveal any acute intracranial abnormalities, findings consistent with mild early chronic microangiopathic change.  MRI C-spine with/without revealing DDD most prominent at C3-C4 and C6-C7. At C6-C7 with stable R paramedian disc/osteophyte hypertrophic change resulting in mild canal stenosis and foraminal narrowing.  Normal Labs: TSH, JALEESA, RF, CRP, ESR, CK, Lyme Ab  A1c 5.3, LDL 62, B12 312, folate 10.4  Labs Pending: ANCA, Anti-Scl, MMA, SPEP, Sjogren's, B6     Impression: Pt's reported  symptoms remains somewhat puzzling. She denies similar symptoms in the past. Reported R facial weakens that was concerning in ED appears unchanged when compared to license with evidence of mild R nasolabial flattening and decreased right orbital caliber likely secondary from prior orbital floor fracture. Brain imaging without evidence of acute ischemia or demyelinating process. C-spine MRI showed DDD and stable C6-7 R paramedian disc/osteophyte change resulting in mild canal stenosis/foraminal narrowing, but without evidence of cord abnormalities. Symptoms predominately are isolated to RUE that appear most pronounced distally. Appears to be without any sensory involvement. There are features concerning for possible neuromuscular diease process but no clear localizable pathology to explain the constellation of her sxs. Does have Hx of R CTS but this would not explain her new onset tremor or diffuse extremity atrophy. Patient would benefit most from EMG/NCS which she currently has scheduled on 9/18. Neuropathy labs pending. Given her hx of prior thoracic syrinx will obtain T Spine imaging however unlikely to reveal cause of her currently symptomology.     Plan:  Stroke pathway discontinued. ASA and Statin  d/c given no evidence of acute ischemic.   Labs Pending: ANCA, Anti-Scl, MMA, SPEP, Sjogren's, B6   MRI T Spine w/wo  PT OT  Remainder of care per primary team appreciated  Outpatient EMG scheduled for 9/18/24  Close follow up with Neuromuscular upon d/c     Subjective   SUBJECTIVE   Patient seen this morning and examined at bedside. Reports ongoing symptoms unchanged from day prior.    Review of Systems: History obtained from the patient  Complete 14 point review of systems completed and was otherwise unremarkable .  Objective   OBJECTIVE   Patient ID: Emely Thibodeaux is a 53 y.o. female.  VS: Temp:  [97.8 °F (36.6 °C)-98.7 °F (37.1 °C)] 97.8 °F (36.6 °C)  HR:  [82-92] 92  Resp:  [18] 18  BP: (103-132)/(68-88) 103/68     NEUROLOGIC  EXAM:  Mental Status: Alertness: alert, Orientation: time, date, person, place, city, president, Speech/Language normal rate, normal volume, normal pitch, fluent, coherent, articulation error, Commands: Can follow multistep commands  Cranial Nerves:  I Not tested   II Visual Fields normal   II, Ill,  IV, VI Pupils equal, round, reactive to light and accommodation  EOM full and intact   V facial sensation was normal and symmetrical   VII Subtle flattening of the right nasolabial fold, right orbital socket appears smaller on the right compared to the left   VIII Normal hearing to speech   IX, X Normal Palatal Elevation, No Uvular Deviation   XI Shoulder shrug and head turn is normal   XII Midline tongue protrusion   Motor: The right upper extremity has evidence of global muscle atrophy with mild increased tone at the wrist. Noted to have involuntary mixed action tremor with intention kinetic and postural components of the R hand and fingers. Noted to have bradykinetic motor function isolated to the right upper extremity.  Strength testing  Arm Right  Left Leg Right  Left   Deltoid 4/5 5/5 lliopsoas 5/5 5/5   Biceps 5/5 5/5 Quads 5/5 5/5   Triceps 5/5 5/5 Hamstrings 5/5 5/5   Wrist  "Extension 4+/5 5/5 Ankle Dorsi Flexion 5/5 5/5   Wrist Flexion 4/5 5/5 Ankle Plantar Flexion 5/5 5/5   Reflexes: No clonus, no Fox's, no cross abductors, toes down     BICEP   TRICEPS   BRACHIO   PATELLAR   ACHILLES   RIGHT 2+ 2+ 2+ 2+ 2+   LEFT 2+ 2+ 2+ 2+ 2+   Sensory: Normal light touch sensation, Normal pin prick sensation, Normal vibratory sensation, Normal temperature sensation, and Normal proprioception  Coordination: Cerebellar arm drift absent. Finger To Nose: Right Intact, Left Intact. Heel To Shin: Right Intact, Left Intact  Station/Gait: Deferred d/t medical severity  LABORATORY DATA    Labs: I have personally reviewed pertinent labs.    CBC:  Results from last 7 days   Lab Units 08/20/24  1107   WBC Thousand/uL 7.79   RBC Million/uL 4.42   HEMOGLOBIN g/dL 13.5   HEMATOCRIT % 40.6   MCV fL 92   MCH pg 30.5   MCHC g/dL 33.3   RDW % 13.6   MPV fL 10.3   PLATELETS Thousands/uL 196   NRBC AUTO /100 WBCs 0   SEGS PCT % 72   LYMPHO PCT % 18   MONO PCT % 6   EOS PCT % 2   BASOS PCT % 1   TOTAL NEUT ABS Thousands/µL 5.77   LYMPHS ABS Thousands/µL 1.38   MONOS ABS Thousand/µL 0.44   EOS ABS Thousand/µL 0.12   ,   Chemistry Profile:   Results from last 7 days   Lab Units 08/20/24  1107   POTASSIUM mmol/L 3.8   CHLORIDE mmol/L 104   CO2 mmol/L 26   BUN mg/dL 10   CREATININE mg/dL 0.76   CALCIUM mg/dL 9.0   AST U/L 13   ALT U/L 16   ALK PHOS U/L 28*   EGFR ml/min/1.73sq m 90     Results from last 7 days   Lab Units 08/20/24  1107   GLUCOSE RANDOM mg/dL 108      Recent Labs     08/20/24  1036   POCGLU 119     PT/INR: No results found for: \"PT\", \"INR\",   Hemoglobin A1c 5.3 (8/21/2024), LDL 62 (8/21/2024)    Micro: I have reviewed pertinent results.      Lab Results   Component Value Date    URINECX >100,000 cfu/ml Klebsiella pneumoniae (A) 03/30/2021     IMAGING STUDIES    Radiology Results: I have personally reviewed pertinent reports and reviewed films in PACS  MRI cervical spine w wo contrast    Result Date: " 8/21/2024  Impression: Cervical degenerative disc disease most prominent at the C3-4 and C6-7 levels. At C6-7 there is stable right paramedian disc/osteophyte hypertrophic change and uncinate joint hypertrophic changes resulting in mild canal stenosis and foraminal narrowing. There is foraminal narrowing at C3-4 which is new compared to the prior examination. Workstation performed: [id number]     MRI brain w wo contrast    Result Date: 8/21/2024  Impression: No acute intracranial abnormality. A few small white matter hyperintensities on T2 and FLAIR imaging within the periphery of the cerebral hemispheres may represent mild early chronic microangiopathic change. Workstation performed: [id number]     CTA head and neck with and without contrast    Result Date: 8/20/2024  Impression: CT head: -No acute vascular territory infarct, intracranial hemorrhage or mass effect. If there is persistent symptoms recommend MRI of the brain for further evaluation. CTA head: -No large vessel occlusion, high-grade stenosis or aneurysm. CTA neck: Right brachiocephalic and proximal left common carotid artery cannot be evaluated. Streak artifact limits evaluation of the distal V2 segments of the vertebral arteries. Otherwise: -No high-grade stenosis or dissection or aneurysm. Other: -Sequelae of right thyroidectomy. Subcentimeter left thyroid lobe hypodensity. Correlate with thyroid ultrasound. Workstation performed: [id number]      Other Diagnostic Results: I have personally reviewed pertinent reports  Results for orders placed during the hospital encounter of 07/17/24    Echo complete w/ contrast if indicated    Interpretation Summary    Left Ventricle: Left ventricular cavity size is normal. Wall thickness is normal. The left ventricular ejection fraction is 55%. Systolic function is normal. Wall motion is normal. Diastolic function is normal.    Right Ventricle: Right ventricular cavity size is normal. Systolic function is  normal.    Tricuspid Valve: There is mild regurgitation.    Encounter Date: 08/20/24   ECG 12 lead   Result Value    Ventricular Rate 87    Atrial Rate 87    OK Interval 122    QRSD Interval 84    QT Interval 342    QTC Interval 411    P Placida 67    QRS Axis 76    T Wave Axis 67    Narrative    Normal sinus rhythm  Normal ECG  No previous ECGs available  Confirmed by Kelli Garcia ([id number]) on 8/21/2024 4:39:37 PM        ACTIVE MEDICATIONS   Scheduled PRN   heparin (porcine), 5,000 Units, Q8H MUKESH  traZODone, 300 mg, HS      ALPRAZolam, 0.25 mg, BID PRN       Continuous          VTE Mechanical Prophylaxis: Sequential Compression Device  VTE Pharmacologic Prophylaxis: VTE covered by:  heparin (porcine), Subcutaneous      ====================================================  Fausto Newby DO [facility] Neurology Residency, PGY-3

## 2024-08-21 NOTE — SPEECH THERAPY NOTE
Speech Language/Pathology    Speech/Language Pathology Progress Note    Patient Name: Emely Thibodeaux  Today's Date: 8/21/2024     Problem List  Principal Problem:    Stroke-like symptoms  Active Problems:    Anxiety    WPW (Pedrito-Parkinson-White syndrome)    Insomnia     Emely Thibodeaux is a 53 y.o. female who presents with past medical history of anxiety, insomnia, WPW presents with complaints of right upper extremity and right lower extremity weakness.  Going on for almost 2 weeks, gradually getting worse.  Initially it started as tingling sensation in the right upper extremity predominantly around the elbow radiating up to the hand on the right side now progressed to tremors, weakness, incoordination.  For the last 2 days patient also noted to have some speech difficulty.  Patient  also noticed some facial asymmetry with right-sided droopiness of the last 2 days.    Per neuro: Unclear etiology of patient's reported motor dysfunction and questionable facial asymmetry and speech disturbance. It is unclear exact timeframe of her symptomatology as she has complained of similar motor impairment of the extremity in the past. Does have a history of cervical radiculopathy and shoulder arthropathy with rotator cuff repair. There is concern for stroke as a cause of her clinical presentation by ED provider however have a low clinical suspicion at this time for acute ischemic event. Her symptoms do not have a clear localization.     Consult received for speech/swallow eval on stroke pathway. Pt passed nsg swallow screen; tolerating regular diet w/o s/s dysphagia or aspiration. No speech/language deficits reported.   NIH score is 1 (minimal)     MRI: 8/21/24  no acute intracranial abnormality.     No need for formal speech/swallow eval at this time. Reconsult if needed.      Carmelita Flower MA CCC-SLP  Speech Pathologist  [place] license # [id number]  [place] license # [id number]  Available via Secure Chat

## 2024-08-21 NOTE — OCCUPATIONAL THERAPY NOTE
Occupational Therapy Evaluation     Patient Name: Emely Thibodeaux  Today's Date: 8/21/2024  Problem List  Principal Problem:    Stroke-like symptoms  Active Problems:    Anxiety    WPW (Pedrito-Parkinson-White syndrome)    Insomnia    Past Medical History  Past Medical History:   Diagnosis Date    Anxiety     Arthritis     Chronic pain     Neck. Numbness in fingers    Colon polyp     CTS (carpal tunnel syndrome)     Right    DDD (degenerative disc disease), cervical     DDD (degenerative disc disease), lumbar     Disease of thyroid gland     History of COVID-19     History of echocardiogram 11/26/2012    EF 55%, NWMA, Normal    Insomnia     Orbital floor (blow-out) closed fracture (HCC) 01/20/2011    Palpitations     Pneumonia 2008    WPW (Pedrito-Parkinson-White syndrome)      Past Surgical History  Past Surgical History:   Procedure Laterality Date    AUGMENTATION MAMMAPLASTY Bilateral 2010    AUGMENTATION MAMMAPLASTY Bilateral 04/01/2022    redone    BREAST SURGERY Bilateral     Augmentation    EPIDURAL BLOCK INJECTION N/A 05/03/2018    Procedure: CERVICAL EPIDURAL STEROID INJECTION;  Surgeon: Kye Webster MD;  [facility];  Service: Pain Management     GA DSTR NROLYTC AGNT PARVERTEB FCT SNGL LMBR/SACRAL Right 12/15/2016    Procedure: LUMBAR RADIOFREQUENCY LESIONING ;  Surgeon: Kye Webster MD;  [facility];  Service: Pain Management     GA NJX DX/THER AGT PVRT FACET JT CRV/THRC 1 LEVEL Right 06/07/2018    Procedure: C4-5 C5-6 C7-T1 CERVICAL MEDIAL BRANCH BLOCK;  Surgeon: Kye Webster MD;  [facility];  Service: Pain Management     ROTATOR CUFF REPAIR Right     THYROIDECTOMY, PARTIAL      TRANSUMBILICAL AUGMENTATION MAMMAPLASTY Bilateral          08/21/24 0811   OT Last Visit   OT Visit Date 08/21/24   Note Type   Note type Evaluation   Pain Assessment   Pain Assessment Tool 0-10   Pain Score No Pain   Hospital Pain Intervention(s) Repositioned;Ambulation/increased  "activity;Emotional support   Restrictions/Precautions   Weight Bearing Precautions Per Order No   Other Precautions Multiple lines   Home Living   Type of Home House  (2  with 4 GENNA)   Home Layout Two level;Laundry in basement;Bed/bath upstairs;Performs ADLs on one level;Able to live on main level with bedroom/bathroom;Access   Bathroom Shower/Tub Walk-in shower   Bathroom Toilet Raised   Bathroom Equipment Other (Comment)  (No DME)   Bathroom Accessibility Accessible   Home Equipment Other (Comment)  (No DME)   Additional Comments Pt reports living with her  and son in a 2  with 4 GENNA, laundy in basement; main bedroom/bathroom upstairs on 2nd floor   Prior Function   Level of [place] Independent with ADLs;Independent with functional mobility;Independent with IADLS   Lives With Spouse;Son   Receives Help From Family   IADLs Independent with driving;Independent with meal prep;Independent with medication management   Falls in the last 6 months 0   Vocational Full time employment   Comments (+) driving   Lifestyle   Autonomy PTA, pt was independent in ADLs/IADLs and uses no DME for functional mobility; (+) driving   Reciprocal Relationships Lives with her  and son   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    Intrinsic Gratification Enjoys being active, going to the beach, and working out   Subjective   Subjective \"I noticed I couldn't move my toes to get my flip flops on and couldn't use my right hand as well.\"   ADL   Where Assessed Edge of bed   Eating Assistance 6  Modified independent   Grooming Assistance 6  Modified Independent   UB Bathing Assistance 6  Modified Independent   LB Bathing Assistance 6  Modified Independent   UB Dressing Assistance 6  Modified independent   LB Dressing Assistance 6  Modified independent   Toileting Assistance  6  Modified independent   Functional Assistance 6  Modified independent   Bed Mobility   Supine to Sit 7  Independent   Sit to Supine 7  Independent "   Additional Comments At end of session, pt left sitting in bed with all functional needs in reach   Transfers   Sit to Stand 7  Independent   Stand to Sit 7  Independent   Additional Comments w/ no DME   Functional Mobility   Functional Mobility 6  Modified independent   Additional Comments Pt completed household functional mobility within room @ Mod I level w/ no DME   Balance   Static Sitting Normal   Dynamic Sitting Good   Static Standing Good   Dynamic Standing Fair +   Ambulatory Fair +   Activity Tolerance   Activity Tolerance Patient tolerated treatment well   RUE Assessment   RUE Assessment WFL  (Grossly 3+/5 MMT)   LUE Assessment   LUE Assessment WFL   Hand Function   Gross Motor Coordination Functional   Fine Motor Coordination Functional   Hand Function Comments Impaired R hand, decreased finger to thumb opposition; dysdiadochokinesia RUE; impaired in hand manipulation w/ R hand with common ADL objects   Sensation   Light Touch No apparent deficits   Additional Comments Pt reporting no numbness/tingling   Proprioception   Proprioception No apparent deficits   Vision - Complex Assessment   Additional Comments Pt reporting no change in vision however reported that prior to admission, there were a few nights when she would lay down in bed at night and the room would be spinning   Perception   Inattention/Neglect Appears intact   Cognition   Overall Cognitive Status WFL   Arousal/Participation Alert;Responsive;Arousable;Cooperative   Attention Within functional limits   Orientation Level Oriented X4   Memory Within functional limits   Following Commands Follows all commands and directions without difficulty   Comments Pt very pleasant and cooperative   Assessment   Prognosis Good   Assessment Pt is a 52 yo female who presented to [facility] with RUE/RLE weakness, RUE tremors, tingling sensation, speech difficulties, and facial asymmetry in which pt dx w/ stroke like symptoms. MRI pending. Pt  has a  past medical history of Anxiety, Arthritis, Chronic pain, Colon polyp, CTS (carpal tunnel syndrome), DDD (degenerative disc disease), cervical, DDD (degenerative disc disease), lumbar, Disease of thyroid gland, History of COVID-19, History of echocardiogram (11/26/2012), Insomnia, Orbital floor (blow-out) closed fracture (HCC) (01/20/2011), Palpitations, Pneumonia (2008), and WPW (Pedrito-Parkinson-White syndrome). Pt with active OT orders and OT consulted to assess pt's functional status and occupational performance to determine safe d/c needs. Pt lives with her  and son in a 2  with 4 GENNA. PTA, pt was independent in ADLs/IADLs and uses no DME for functional mobility. (+) driving. Discussed role and scope of OT in which pt was receptive. At this time, pt is not demonstrating any significant occupational deficits and is functioning at a level of independent for bed mobility and functional transfers, Mod I for functional mobility w/ no DME, and Mod I for UB/LB ADLs. From an OT standpoint, recommend discharge to home with outpatient OT for RUE coordination/fine motor + with increased support once medically stable. Pt was receptive regarding education on returning home safely with energy conservation techniques and demonstrated good carryover during occupational/functional performance. At this time, pt demonstrates good insight/safety awareness and does not express any concerns regarding performing ADL/IADL/functional mobility tasks. No further skilled acute care OT services are needed at this time. The patient's raw score on the AM-PAC Daily Activity Inpatient Short Form is 24. A raw score of greater than or equal to 19 suggests the patient may benefit from discharge to home. Please refer to the recommendation of the Occupational Therapist for safe discharge planning.  Recommend continued engagement in ADL/functional mobility tasks with nursing and restorative therapy staff as appropriate to promote the highest  level of independence prior to discharge. OT is discharging pt from caseload at this time, please reconsult if needed.   Goals   Patient Goals To get better, to go home and go to the beach   Plan   OT Frequency Eval only   Discharge Recommendation   Rehab Resource Intensity Level, OT (S)  III (Minimum Resource Intensity)  (Outpatient OT for RUE/fine motor/coordination)   AM-PAC Daily Activity Inpatient   Lower Body Dressing 4   Bathing 4   Toileting 4   Upper Body Dressing 4   Grooming 4   Eating 4   Daily Activity Raw Score 24   Daily Activity Standardized Score (Calc for Raw Score >=11) 57.54   AM-PAC Applied Cognition Inpatient   Following a Speech/Presentation 4   Understanding Ordinary Conversation 4   Taking Medications 4   Remembering Where Things Are Placed or Put Away 4   Remembering List of 4-5 Errands 4   Taking Care of Complicated Tasks 4   Applied Cognition Raw Score 24   Applied Cognition Standardized Score 62.21   End of Consult   Education Provided Yes   Patient Position at End of Consult Supine;All needs within reach   Nurse Communication Nurse aware of consult       Gaby Agosto MS, OTR/L

## 2024-08-21 NOTE — PHYSICAL THERAPY NOTE
PHYSICAL THERAPY EVALUATION NOTE          Patient Name: Emely Thibodeaux  Today's Date: 2024        AGE:   53 y.o.  Mrn:   [id number]  ADMIT DX:  Tremor [R25.1]  Right sided weakness [R53.1]  Stroke-like symptoms [R29.90]    Past Medical History:  Past Medical History:   Diagnosis Date    Anxiety     Arthritis     Chronic pain     Neck. Numbness in fingers    Colon polyp     CTS (carpal tunnel syndrome)     Right    DDD (degenerative disc disease), cervical     DDD (degenerative disc disease), lumbar     Disease of thyroid gland     History of COVID-19     History of echocardiogram 2012    EF 55%, NWMA, Normal    Insomnia     Orbital floor (blow-out) closed fracture (HCC) 2011    Palpitations     Pneumonia     WPW (Pedrito-Parkinson-White syndrome)        Past Surgical History:  Past Surgical History:   Procedure Laterality Date    AUGMENTATION MAMMAPLASTY Bilateral     AUGMENTATION MAMMAPLASTY Bilateral 2022    redone    BREAST SURGERY Bilateral     Augmentation    EPIDURAL BLOCK INJECTION N/A 2018    Procedure: CERVICAL EPIDURAL STEROID INJECTION;  Surgeon: Kye Webster MD;  [facility];  Service: Pain Management     FL DSTR NROLYTC AGNT PARVERTEB FCT SNGL LMBR/SACRAL Right 12/15/2016    Procedure: LUMBAR RADIOFREQUENCY LESIONING ;  Surgeon: Kye Webster MD;  [facility];  Service: Pain Management     FL NJX DX/THER AGT PVRT FACET JT CRV/THRC 1 LEVEL Right 2018    Procedure: C4-5 C5-6 C7-T1 CERVICAL MEDIAL BRANCH BLOCK;  Surgeon: Kye Webster MD;  [facility];  Service: Pain Management     ROTATOR CUFF REPAIR Right     THYROIDECTOMY, PARTIAL      TRANSUMBILICAL AUGMENTATION MAMMAPLASTY Bilateral      Length Of Stay: 0        PHYSICAL THERAPY EVALUATION:    Patient's identity confirmed via 2 patient identifiers (full name and ) at start of session       24 1438   PT Last Visit   PT  "Visit Date 24   Note Type   Note type Evaluation   Pain Assessment   Pain Assessment Tool 0-10   Pain Score No Pain   Restrictions/Precautions   Weight Bearing Precautions Per Order No   Home Living   Type of Home House   Home Layout Two level;Laundry in basement;Bed/bath upstairs  (4 GENNA, full flight of stairs to 2nd floor main bedroom and full bath)   Bathroom Shower/Tub Walk-in shower   Bathroom Toilet Raised   Bathroom Equipment   (none)   Home Equipment   (none)   Prior Function   Level of [place] Independent with functional mobility;Independent with ADLs;Independent with IADLS   Lives With Spouse;Son  ( and 16 year old son)   Receives Help From Family   IADLs Independent with driving;Independent with meal prep;Independent with medication management   Falls in the last 6 months 0   Comments At baseline pt is fully ind w/o AD, performs all ADLs and IADLs ind, 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   General   Additional Pertinent History Brain MRI: no acute intracranial abnormality   Family/Caregiver Present No   Cognition   Overall Cognitive Status WFL   Arousal/Participation Cooperative   Attention Within functional limits   Orientation Level Oriented X4   Memory Within functional limits   Following Commands Follows all commands and directions without difficulty   Comments Pt ID via name and ; pt agreeable to PT eval and mobility, very pleasant throughout   Subjective   Subjective Pt decribes symptoms starting approx 2 weeks ago, contines to experience difficulty w/ moving fingers and toes on R side (described difficulty inching toes into R flip flop , pt demonstrating task in standing during PT eval w/ decreased active movement of R toe flexion/extension observed). Pt endorses continued speeching difficulty, describes feeling like she is \"drunk\" and unable to get the words out   RLE Assessment   RLE Assessment X  (pt w/ difficulty moving R toes)   Strength RLE   R Knee Flexion 5/5   R Knee " Extension 5/5   R Ankle Dorsiflexion 4/5   R Ankle Plantar Flexion 5/5   LLE Assessment   LLE Assessment X   Strength LLE   L Knee Flexion 5/5   L Knee Extension 5/5   L Ankle Dorsiflexion 5/5   L Ankle Plantar Flexion 5/5   Vision-Basic Assessment   Current Vision   (pt declines acute changes in vision)   Coordination   Movements are Fluid and Coordinated 0   Finger to Nose & Finger to Finger    (increased time required for LUE)   Heel to [place]   (increased time required for LLE)   Light Touch   RLE Light Touch Grossly intact   LLE Light Touch Grossly intact   Bed Mobility   Supine to Sit 6  Modified independent   Additional items HOB elevated   Sit to Supine 6  Modified independent   Additional items HOB elevated   Additional Comments able to maintain sitting balance independently at EOB   Transfers   Sit to Stand 7  Independent   Stand to Sit 7  Independent   Ambulation/Elevation   Gait pattern Decreased foot clearance   Gait Assistance 7  Independent   Assistive Device None   Distance 10'   Stair Management Assistance 6  Modified independent   Stair Management Technique One rail R;Step to pattern   Number of Stairs 8   Ambulation/Elevation Additional Comments pt able to ambulate short distance w/in room (declined further at this time), reports she has been up and walking independently to/from the bathroom during current admission   Balance   Static Sitting Normal   Dynamic Sitting Normal   Static Standing Good   Dynamic Standing Good   Ambulatory Good   Activity Tolerance   Activity Tolerance Patient tolerated treatment well   Medical Staff Made Aware spoke to OT Gaby, confirmed w/ RENÉE Zarco pt ok to evaluate pending thoracic MRI, updated post   Assessment   Prognosis Good   Problem List Decreased coordination   Assessment Emely Thibodeaux is a 53 y.o. Female who [facility] due to CVA/TIA-like symptom and diagnosis of R sided weakness. Orders for PT eval and treat received. Comorbidities  affecting pt at time of eval include: anxiety, thyroid disorder, CMC of thumb (R). Personal factors affecting pt DC include: lives in 2 story house and stairs to enter home. At baseline, pt mobilizes independently w/ no AD, w/ 0 falls in the last 6 months. Upon evaluation, pt presents w/ the following deficits: impaired strength and impaired coordination. Upon eval, is currently performing  mod I for bed mobility, ind for transfers, ind w/ no AD for ambulation, mod I w/ R UE support for stair negotiation. Pt's clinical presentation is evolving due to abnormal lab values, fluctuating symptoms, ongoing medical management. From PT/mobility standpoint, pt appears to be functioning close to or at mobility baseline (pt mobilized ind), therefore no further immediate skilled PT needs are warranted at this time and pt will be DC from IPPT caseload. When medically cleared for DC, recommend OPPT, hand therapy. Please re-consult if skilled PT needs are warranted.   Goals   Patient Goals to get better, to know what is going on   Plan   Treatment/Interventions   (DC IPPT)   Discharge Recommendation   Rehab Resource Intensity Level, PT III (Minimum Resource Intensity)  (recommend OP hand therapy)   AM-PAC Basic Mobility Inpatient   Turning in Flat Bed Without Bedrails 4   Lying on Back to Sitting on Edge of Flat Bed Without Bedrails 4   Moving Bed to Chair 4   Standing Up From Chair Using Arms 4   Walk in Room 4   Climb 3-5 Stairs With Railing 4   Basic Mobility Inpatient Raw Score 24   Basic Mobility Standardized Score 57.68   [facility] Highest Level Of Mobility   -HLM Goal 8: Walk 250 feet or more   -HLM Achieved 6: Walk 10 steps or more   End of Consult   Patient Position at End of Consult All needs within reach  (pt sitting up in bed)       The patient's AM-PAC Basic Mobility Inpatient Short Form Raw Score is 24. A Raw score of greater than 16 suggests the patient may benefit from discharge to home. Please also refer to  the recommendation of the Physical Therapist for safe discharge planning.    Pt will benefit from skilled inpatient PT during this admission in order to facilitate progress towards goals and to maximize functional independence prior to DC      DC rec: level III (Minimum Rehab Resource Intensity), outpatient hand therapy         [place], PT, DPT  08/21/24

## 2024-08-21 NOTE — PLAN OF CARE
Problem: Neurological Deficit  Goal: Neurological status is stable or improving  Description: Interventions:  - Monitor and assess patient's level of consciousness, motor function, sensory function, and level of assistance needed for ADLs.   - Monitor and report changes from baseline. Collaborate with interdisciplinary team to initiate plan and implement interventions as ordered.   - Provide and maintain a safe environment.  - Consider seizure precautions.  - Consider fall precautions.  - Consider aspiration precautions.  - Consider bleeding precautions.  Outcome: Progressing     Problem: Activity Intolerance/Impaired Mobility  Goal: Mobility/activity is maintained at optimum level for patient  Description: Interventions:  - Assess and monitor patient  barriers to mobility and need for assistive/adaptive devices.  - Assess patient's emotional response to limitations.  - Collaborate with interdisciplinary team and initiate plans and interventions as ordered.  - Encourage independent activity per ability.  - Maintain proper body alignment.  - Perform active/passive rom as tolerated/ordered.  - Plan activities to conserve energy.  - Turn patient as appropriate  Outcome: Progressing     Problem: Communication Impairment  Goal: Ability to express needs and understand communication  Description: Assess patient's communication skills and ability to understand information.  Patient will demonstrate use of effective communication techniques, alternative methods of communication and understanding even if not able to speak.     - Encourage communication and provide alternate methods of communication as needed.  - Collaborate with case management/ for discharge needs.  - Include patient/family/caregiver in decisions related to communication.  Outcome: Progressing     Problem: Potential for Aspiration  Goal: Non-ventilated patient's risk of aspiration is minimized  Description: Assess and monitor vital signs,  respiratory status, and labs (WBC).  Monitor for signs of aspiration (tachypnea, cough, rales, wheezing, cyanosis, fever).    - Assess and monitor patient's ability to swallow.  - Place patient up in chair to eat if possible.  - HOB up at 90 degrees to eat if unable to get patient up into chair.  - Supervise patient during oral intake.   - Instruct patient/ family to take small bites.  - Instruct patient/ family to take small single sips when taking liquids.  - Follow patient-specific strategies generated by speech pathologist.  Outcome: Progressing  Goal: Ventilated patient's risk of aspiration is minimized  Description: Assess and monitor vital signs, respiratory status, airway cuff pressure, and labs (WBC).  Monitor for signs of aspiration (tachypnea, cough, rales, wheezing, cyanosis, fever).    - Elevate head of bed 30 degrees if patient has tube feeding.  - Monitor tube feeding.  Outcome: Progressing     Problem: Nutrition  Goal: Nutrition/Hydration status is improving  Description: Monitor and assess patient's nutrition/hydration status for malnutrition (ex- brittle hair, bruises, dry skin, pale skin and conjunctiva, muscle wasting, smooth red tongue, and disorientation). Collaborate with interdisciplinary team and initiate plan and interventions as ordered.  Monitor patient's weight and dietary intake as ordered or per policy. Utilize nutrition screening tool and intervene per policy. Determine patient's food preferences and provide high-protein, high-caloric foods as appropriate.     - Assist patient with eating.  - Allow adequate time for meals.  - Encourage patient to take dietary supplement as ordered.  - Collaborate with clinical nutritionist.  - Include patient/family/caregiver in decisions related to nutrition.  Outcome: Progressing     Problem: PAIN - ADULT  Goal: Verbalizes/displays adequate comfort level or baseline comfort level  Description: Interventions:  - Encourage patient to monitor pain and  request assistance  - Assess pain using appropriate pain scale  - Administer analgesics based on type and severity of pain and evaluate response  - Implement non-pharmacological measures as appropriate and evaluate response  - Consider cultural and social influences on pain and pain management  - Notify physician/advanced practitioner if interventions unsuccessful or patient reports new pain  Outcome: Progressing     Problem: INFECTION - ADULT  Goal: Absence or prevention of progression during hospitalization  Description: INTERVENTIONS:  - Assess and monitor for signs and symptoms of infection  - Monitor lab/diagnostic results  - Monitor all insertion sites, i.e. indwelling lines, tubes, and drains  - Monitor endotracheal if appropriate and nasal secretions for changes in amount and color  - [place] appropriate cooling/warming therapies per order  - Administer medications as ordered  - Instruct and encourage patient and family to use good hand hygiene technique  - Identify and instruct in appropriate isolation precautions for identified infection/condition  Outcome: Progressing  Goal: Absence of fever/infection during neutropenic period  Description: INTERVENTIONS:  - Monitor WBC    Outcome: Progressing     Problem: SAFETY ADULT  Goal: Patient will remain free of falls  Description: INTERVENTIONS:  - Educate patient/family on patient safety including physical limitations  - Instruct patient to call for assistance with activity   - Consult OT/PT to assist with strengthening/mobility   - Keep Call bell within reach  - Keep bed low and locked with side rails adjusted as appropriate  - Keep care items and personal belongings within reach  - Initiate and maintain comfort rounds  - Make Fall Risk Sign visible to staff  - Offer Toileting every  Hours, in advance of need  - Initiate/Maintain alarm  - Obtain necessary fall risk management equipment:   - Apply yellow socks and bracelet for high fall risk patients  -  Consider moving patient to room near nurses station  Outcome: Progressing  Goal: Maintain or return to baseline ADL function  Description: INTERVENTIONS:  -  Assess patient's ability to carry out ADLs; assess patient's baseline for ADL function and identify physical deficits which impact ability to perform ADLs (bathing, care of mouth/teeth, toileting, grooming, dressing, etc.)  - Assess/evaluate cause of self-care deficits   - Assess range of motion  - Assess patient's mobility; develop plan if impaired  - Assess patient's need for assistive devices and provide as appropriate  - Encourage maximum independence but intervene and supervise when necessary  - Involve family in performance of ADLs  - Assess for home care needs following discharge   - Consider OT consult to assist with ADL evaluation and planning for discharge  - Provide patient education as appropriate  Outcome: Progressing  Goal: Maintains/Returns to pre admission functional level  Description: INTERVENTIONS:  - Perform AM-PAC 6 Click Basic Mobility/ Daily Activity assessment daily.  - Set and communicate daily mobility goal to care team and patient/family/caregiver.   - Collaborate with rehabilitation services on mobility goals if consulted  - Perform Range of Motion  times a day.  - Reposition patient every  hours.  - Dangle patient  times a day  - Stand patient  times a day  - Ambulate patient  times a day  - Out of bed to chair  times a day   - Out of bed for meals times a day  - Out of bed for toileting  - Record patient progress and toleration of activity level   Outcome: Progressing     Problem: DISCHARGE PLANNING  Goal: Discharge to home or other facility with appropriate resources  Description: INTERVENTIONS:  - Identify barriers to discharge w/patient and caregiver  - Arrange for needed discharge resources and transportation as appropriate  - Identify discharge learning needs (meds, wound care, etc.)  - Arrange for interpretive services to  assist at discharge as needed  - Refer to Case Management Department for coordinating discharge planning if the patient needs post-hospital services based on physician/advanced practitioner order or complex needs related to functional status, cognitive ability, or social support system  Outcome: Progressing     Problem: Knowledge Deficit  Goal: Patient/family/caregiver demonstrates understanding of disease process, treatment plan, medications, and discharge instructions  Description: Complete learning assessment and assess knowledge base.  Interventions:  - Provide teaching at level of understanding  - Provide teaching via preferred learning methods  Outcome: Progressing     Problem: NEUROSENSORY - ADULT  Goal: Achieves stable or improved neurological status  Description: INTERVENTIONS  - Monitor and report changes in neurological status  - Monitor vital signs such as temperature, blood pressure, glucose, and any other labs ordered   - Initiate measures to prevent increased intracranial pressure  - Monitor for seizure activity and implement precautions if appropriate      Outcome: Progressing  Goal: Remains free of injury related to seizures activity  Description: INTERVENTIONS  - Maintain airway, patient safety  and administer oxygen as ordered  - Monitor patient for seizure activity, document and report duration and description of seizure to physician/advanced practitioner  - If seizure occurs,  ensure patient safety during seizure  - Reorient patient post seizure  - Seizure pads on all 4 side rails  - Instruct patient/family to notify RN of any seizure activity including if an aura is experienced  - Instruct patient/family to call for assistance with activity based on nursing assessment  - Administer anti-seizure medications if ordered    Outcome: Progressing  Goal: Achieves maximal functionality and self care  Description: INTERVENTIONS  - Monitor swallowing and airway patency with patient fatigue and changes in  neurological status  - Encourage and assist patient to increase activity and self care.   - Encourage visually impaired, hearing impaired and aphasic patients to use assistive/communication devices  Outcome: Progressing

## 2024-08-22 ENCOUNTER — APPOINTMENT (OUTPATIENT)
Dept: MRI IMAGING | Facility: HOSPITAL | Age: 54
End: 2024-08-22
Payer: COMMERCIAL

## 2024-08-22 VITALS
HEIGHT: 68 IN | HEART RATE: 74 BPM | TEMPERATURE: 98.5 F | BODY MASS INDEX: 20.01 KG/M2 | DIASTOLIC BLOOD PRESSURE: 72 MMHG | WEIGHT: 132.06 LBS | OXYGEN SATURATION: 99 % | SYSTOLIC BLOOD PRESSURE: 114 MMHG | RESPIRATION RATE: 17 BRPM

## 2024-08-22 LAB
ALBUMIN SERPL ELPH-MCNC: 3.43 G/DL (ref 3.2–5.1)
ALBUMIN SERPL ELPH-MCNC: 61.3 % (ref 48–70)
ALPHA1 GLOB SERPL ELPH-MCNC: 0.25 G/DL (ref 0.15–0.47)
ALPHA1 GLOB SERPL ELPH-MCNC: 4.4 % (ref 1.8–7)
ALPHA2 GLOB SERPL ELPH-MCNC: 0.53 G/DL (ref 0.42–1.04)
ALPHA2 GLOB SERPL ELPH-MCNC: 9.5 % (ref 5.9–14.9)
BETA GLOB ABNORMAL SERPL ELPH-MCNC: 0.4 G/DL (ref 0.31–0.57)
BETA1 GLOB SERPL ELPH-MCNC: 7.1 % (ref 4.7–7.7)
BETA2 GLOB SERPL ELPH-MCNC: 4.8 % (ref 3.1–7.9)
BETA2+GAMMA GLOB SERPL ELPH-MCNC: 0.27 G/DL (ref 0.2–0.58)
ENA SCL70 AB SER-ACNC: 0.3 AI (ref 0–0.9)
ENA SS-A AB SER-ACNC: <0.2 AI (ref 0–0.9)
ENA SS-B AB SER-ACNC: <0.2 AI (ref 0–0.9)
GAMMA GLOB ABNORMAL SERPL ELPH-MCNC: 0.72 G/DL (ref 0.4–1.66)
GAMMA GLOB SERPL ELPH-MCNC: 12.9 % (ref 6.9–22.3)
IGG/ALB SER: 1.58 {RATIO} (ref 1.1–1.8)
PROT PATTERN SERPL ELPH-IMP: ABNORMAL
PROT SERPL-MCNC: 5.6 G/DL (ref 6.4–8.2)

## 2024-08-22 PROCEDURE — 84165 PROTEIN E-PHORESIS SERUM: CPT | Performed by: PATHOLOGY

## 2024-08-22 PROCEDURE — 99239 HOSP IP/OBS DSCHRG MGMT >30: CPT | Performed by: PHYSICIAN ASSISTANT

## 2024-08-22 PROCEDURE — 72157 MRI CHEST SPINE W/O & W/DYE: CPT

## 2024-08-22 PROCEDURE — A9585 GADOBUTROL INJECTION: HCPCS

## 2024-08-22 PROCEDURE — 71552 MRI CHEST W/O & W/DYE: CPT

## 2024-08-22 RX ORDER — GADOBUTROL 604.72 MG/ML
6 INJECTION INTRAVENOUS
Status: COMPLETED | OUTPATIENT
Start: 2024-08-22 | End: 2024-08-22

## 2024-08-22 RX ADMIN — GADOBUTROL 6 ML: 604.72 INJECTION INTRAVENOUS at 16:20

## 2024-08-22 NOTE — DISCHARGE SUMMARY
[facility]  Discharge- Emely Thibodeaux 1970, 53 y.o. female MRN: [id number]  Unit/Bed#: S -01 Encounter: [id number]  Primary Care Provider: ROHIT Suazo   Date and time admitted to hospital: 8/20/2024 10:28 AM    * Right sided weakness  Assessment & Plan  Presents with 2-week duration of right upper extremity and right lower extremity weakness, upper extremity weakness greater than lower extremity, associated with tremors in the upper and lower extremities, right lower extremity predominantly twitching.    Also noted temperature change in the RUE (intermittent coolness)  Some swallowing difficulties   Speech difficulties noted   Symptoms not getting any better and hence presented to the emergency department.  MRI Brain negative for CVA, mass, or demyelination   MRI C-Spine with mild right sided stenosis, doubt etiology  Autoimmune work up negative so far, but some studies pending   ?Myastenia ?Thoracic Outlet Syndrome (would not explain speech/swallowing problems)  MRI Thoracic Spine showed no explanation of symptoms  MRI Brachial Plexius showed no explanation of symptoms  Stable for discharge   Neurology following   Neuromuscular follow up needed   EMG recommended   Neuropathy labs pending    WPW (Pedrito-Parkinson-White syndrome)  Assessment & Plan  History of WPW, palpitations, patient seen cardiology in July 2024 and underwent echocardiogram, exercise stress test which came back within normal limit.  Avoid AV monica blocking agents.    Anxiety  Assessment & Plan  Stable   Xanax at night as needed.    Insomnia  Assessment & Plan  Patient takes trazodone 300 mg daily at night.        Medical Problems       Resolved Problems  Date Reviewed: 8/22/2024   None       Discharging Physician / Practitioner: Haroldo Farrell PA-C  PCP: ROHIT Suazo  Admission Date:   Admission Orders (From admission, onward)       Ordered        08/20/24 1354  Place in  Observation  Once                          Discharge Date: 08/22/24    Consultations During Hospital Stay:  Neurology - Dr. Willett     Procedures Performed:   CTA Head and Neck   MRI Brain   MRI C-Spine  MRI T-Spine  MRI Brachial Plexus     Significant Findings / Test Results:   CTA Head and Neck: No acute vascular territory infarct, intracranial hemorrhage, or mass effect. If there is persistent symptoms recommend MRI of the brain for further evaluation. No large vessel occlusion, high grade stenosis or aneurysm. Right brachiocephalic and proximal left common carotid artery cannot be evaluated. Streak artifact limits evaluation of the distal V2 segments of the vertebral arteries. Otherwise no high grade stenosis or dissection or aneurysm. Sequelae of right thryroidectomy. Subcentimeter left thyroid lobe hypodensity. Correlate with thyroid ultrasound   MRI Brain: No acute intracranial abnormality. A few small white matter hyperintensities on T2 and FLAIR imaging within the periphery of the cerebral hemispheres may represent mild early chronic microangiopathic change.   MRI C-Spine: Cervical degenerative disc disease most prominent at the C3-4 and C6-7 levels. At C6-7 there is stable right paramedian disc/osteophyte hypertrophic change and uncinate joint hypertrophic changes resulting in mild canal stenosis and foraminal narrowing. There is foraminal narrowing at C3-4 which is new compared to prior exam.   MRI T-Spine: No interval change. No significant canal stenosis or foraminal narrowing. Stable mild prominence of the central canal in the mid thoracic cord. No abnormal enhancement is identified within the thoracic spinal canal.   MRI Brachial Plexus: No discrete edema or enhancement outside along the course of the right brachial plexus. Small incidental lobulated T2 hyperintense lesion with enhancement involving the right dorsal chest wall may represent an incidental hemangioma. Consider follow-up with MRI of the  "chest wall to ensure stability and exclude other etiologies     Incidental Findings:   None     Test Results Pending at Discharge (will require follow up):   Neuropathy work up      Outpatient Tests Requested:  EMG    Complications:  None    Reason for Admission: Right sided weakness    Hospital Course:   Emely Thibodeaux is a 53 y.o. female patient who originally presented to the hospital on 8/20/2024 due to right sided weakness. She was admitted for stroke work up which was negative. Neurology was consulted. Neuropathy labs were ordered and of the ones that have resulted they are negative. MRI brain was negative for stroke, demyelination, or mass. C-spine MRI did not have significant disease. T-spine and Brachial plexus MRI showed no explanation for her symptomology. Ultimately she will require neuromuscular follow up for EMG testing and further management from there. She is scheduled for this next Friday. She was discharged home and referred to neuromuscular and PT.     Hospital Course: No notes on file        Please see above list of diagnoses and related plan for additional information.     Condition at Discharge: stable    Discharge Day Visit / Exam:   Subjective:  Patient reports stability in symptoms regarding right sided weakness, speech difficulty, and tremor. She does not want to stay in the hospital another night.   Vitals: Blood Pressure: 114/72 (08/22/24 1114)  Pulse: 74 (08/22/24 1114)  Temperature: 98.5 °F (36.9 °C) (08/22/24 0711)  Temp Source: Oral (08/22/24 0711)  Respirations: 17 (08/22/24 0711)  Height: 5' 7.5\" (171.5 cm) (08/20/24 1726)  Weight - Scale: 59.9 kg (132 lb 0.9 oz) (08/20/24 1726)  SpO2: 99 % (08/22/24 1114)  Exam:   Physical Exam  Constitutional:       General: She is not in acute distress.     Appearance: Normal appearance. She is normal weight. She is not ill-appearing or diaphoretic.   HENT:      Head: Normocephalic and atraumatic.      Mouth/Throat:      Mouth: Mucous membranes " are moist.   Eyes:      General: No scleral icterus.     Pupils: Pupils are equal, round, and reactive to light.   Cardiovascular:      Rate and Rhythm: Normal rate and regular rhythm.      Pulses: Normal pulses.      Heart sounds: Normal heart sounds, S1 normal and S2 normal. No murmur heard.     No systolic murmur is present.      No diastolic murmur is present.      No gallop. No S3 or S4 sounds.   Pulmonary:      Effort: Pulmonary effort is normal. No accessory muscle usage or respiratory distress.      Breath sounds: Normal breath sounds. No stridor. No decreased breath sounds, wheezing, rhonchi or rales.   Chest:      Chest wall: No tenderness.   Abdominal:      General: Bowel sounds are normal. There is no distension.      Palpations: Abdomen is soft.      Tenderness: There is no abdominal tenderness. There is no guarding.   Musculoskeletal:      Right lower leg: No edema.      Left lower leg: No edema.   Skin:     General: Skin is warm and dry.      Coloration: Skin is not jaundiced.   Neurological:      General: No focal deficit present.      Mental Status: She is alert. Mental status is at baseline.      Motor: Weakness and tremor present. No seizure activity.   Psychiatric:         Behavior: Behavior is cooperative.          Discussion with Family: Patient declined call to .     Discharge instructions/Information to patient and family:   See after visit summary for information provided to patient and family.      Provisions for Follow-Up Care:  See after visit summary for information related to follow-up care and any pertinent home health orders.      Mobility at time of Discharge:   Basic Mobility Inpatient Raw Score: 24  JH-HLM Goal: 8: Walk 250 feet or more  JH-HLM Achieved: 7: Walk 25 feet or more  HLM Goal NOT achieved. Continue to encourage mobility in post discharge setting.     Disposition:   Home    Planned Readmission: None      Discharge Statement:  I spent 45 minutes discharging  the patient. This time was spent on the day of discharge. I had direct contact with the patient on the day of discharge. Greater than 50% of the total time was spent examining patient, answering all patient questions, arranging and discussing plan of care with patient as well as directly providing post-discharge instructions.  Additional time then spent on discharge activities.    Discharge Medications:  See after visit summary for reconciled discharge medications provided to patient and/or family.      **Please Note: This note may have been constructed using a voice recognition system**

## 2024-08-22 NOTE — ASSESSMENT & PLAN NOTE
History of anxiety takes benzodiazepines and trazodone at home for insomnia  Will continue her current outpatient regimen while here  What ever you adhere

## 2024-08-22 NOTE — UTILIZATION REVIEW
Initial Clinical Review    Admission: Date/Time/Statement:   Admission Orders (From admission, onward)       Ordered        08/20/24 1354  Place in Observation  Once                          Orders Placed This Encounter   Procedures    Place in Observation     Standing Status:   Standing     Number of Occurrences:   1     Order Specific Question:   Level of Care     Answer:   Med Surg [16]     ED Arrival Information       Expected   -    Arrival   8/20/2024 10:19    Acuity   Emergent              Means of arrival   Walk-In    Escorted by   Spouse    Service   Hospitalist    Admission type   Emergency              Arrival complaint   right sided weakness/unable to move right arm             Chief Complaint   Patient presents with    CVA/TIA-like Symptoms     Pt reports right sided weakness, right sided facial droop, right sided arm and leg weakness xcouple days, bruising on right arm denies injury/trauma       Initial Presentation: 53 y.o. female arrived to ED with complaints of right sided weakness and tremors. Pt reports tremor in RUE 2 weeks ago that has worsened. Also noted worsening weakness in RLE.Pt reports she has difficulty finding the correct words and occasionally stumbles on her words. PMH: Fernandez Parkinson White Syndrome, Anxiety,Arthritis, chronic neck pain, DDD, insomnia,pneumonia. On exam: complains of headache, slight droopiness on the right side,Right upper extremity tremors, incoordination, weakness strength 4/5 , Right lower extremity weakness and mild incoordination strength 4/5. Aspirin given in the ED. Abnormal labs : ALK PHOS 28. CTA head and neck ordered and pending results. Plan is to remain on observation for stroke workup. Neurology Consult ordered.     8/20/24 Neurology Consult: Continue on stroke pathway at this time, discontinue if MRI brain negative for ischemic stroke. Monitor on Telemetry. Started on aspirin. To have MRI brain w and w/o contrast, MRI Cpsine w and w/o contrast. Obtain  Neuropathy labs, PT/OT eval. Outpatient EMG scheduled for 9/18/24.     Anticipated Length of Stay/Certification Statement: Patient will be admitted on an Observation basis with an anticipated length of stay of less than 2 midnights.   Justification for Hospital Stay: Strokelike symptoms     Date: 8/21/22   Day 2: Noted temperature change in RUE ( intermittent coolness), some swallowing and speech difficulties, MRI brain negative for CVA, mass of demyelination, MRI C spine with mild right sided stenosis. Autoimmune workup pending, neuropathy labs pending.     8/21/24 Neurology : Reported R facial weakens that was concerning in ED appears unchanged when compared to license with evidence of mild R nasolabial flattening and decreased right orbital caliber likely secondary from prior orbital floor fracture Brain imaging without evidence of acute ischemia or demyelinating process. C-spine MRI showed DDD and stable C6-7 R paramedian disc/osteophyte change resulting in mild canal stenosis/foraminal narrowing, but without evidence of cord abnormalities. Stroke pathway discontinued. ASA and Statin d/c given no evidence of acute ischemic.     ED Triage Vitals   Temperature Pulse Respirations Blood Pressure SpO2 Pain Score   08/20/24 1034 08/20/24 1032 08/20/24 1032 08/20/24 1032 08/20/24 1032 08/20/24 1032   98 °F (36.7 °C) 95 16 157/95 99 % No Pain     Weight (last 2 days)       Date/Time Weight    08/20/24 1726 59.9 (132.06)    08/20/24 1033 61 (134.48)            Vital Signs (last 3 days)       Date/Time Temp Pulse Resp BP MAP (mmHg) SpO2 O2 Device Patient Position - Orthostatic VS Steph Coma Scale Score Pain    08/22/24 11:14:40 -- 74 -- 114/72 86 99 % -- -- -- --    08/22/24 0800 -- -- -- -- -- -- -- -- -- No Pain    08/22/24 07:11:59 98.5 °F (36.9 °C) 81 17 118/68 85 97 % None (Room air) Lying -- --    08/22/24 03:09:22 97.8 °F (36.6 °C) 75 -- 103/62 76 96 % -- -- -- --    08/21/24 1102 -- -- -- -- -- -- -- -- 15 --     08/21/24 22:09:01 98.4 °F (36.9 °C) 74 17 99/62 74 97 % -- Lying -- --    08/21/24 19:02:38 98.4 °F (36.9 °C) 106 -- 115/77 90 97 % -- -- -- --    08/21/24 1900 -- -- -- -- -- -- -- -- 15 --    08/21/24 14:58:36 97.8 °F (36.6 °C) 92 18 103/68 80 97 % None (Room air) Lying -- --    08/21/24 1438 -- -- -- -- -- -- -- -- -- No Pain    08/21/24 0811 -- -- -- -- -- -- -- -- -- No Pain    08/21/24 07:37:19 98.4 °F (36.9 °C) 82 -- 108/79 89 98 % -- -- -- --    08/20/24 21:47:11 98.7 °F (37.1 °C) 83 18 132/88 103 98 % -- -- -- --    08/20/24 2000 -- -- -- -- -- -- None (Room air) -- 15 No Pain    08/20/24 18:32:41 -- 83 -- 118/79 92 98 % -- -- -- --    08/20/24 1800 -- -- -- -- -- -- -- -- 15 --    08/20/24 1726 -- -- -- -- -- -- -- -- -- No Pain    08/20/24 1600 -- -- -- -- -- -- -- -- 15 No Pain    08/20/24 15:56:20 98.5 °F (36.9 °C) 96 -- 131/79 96 96 % -- -- -- --    08/20/24 1500 -- 92 18 139/76 101 96 % None (Room air) Lying 15 --    08/20/24 1400 -- 90 18 139/70 99 97 % None (Room air) Lying 15 --    08/20/24 1330 -- 81 18 133/70 94 98 % None (Room air) Sitting -- --    08/20/24 1300 -- 91 18 120/83 96 97 % None (Room air) Lying 15 --    08/20/24 1230 -- 78 18 122/66 90 99 % None (Room air) Lying 15 --    08/20/24 1200 -- 86 18 136/74 -- 97 % -- -- 15 --    08/20/24 1130 -- 98 18 149/65 -- 97 % None (Room air) Lying 15 --    08/20/24 1115 -- 83 18 166/71 -- 98 % None (Room air) Lying 15 --    08/20/24 1100 -- 87 18 153/75 105 98 % None (Room air) Lying 15 --    08/20/24 1044 -- -- -- -- -- -- -- -- 15 --    08/20/24 1034 98 °F (36.7 °C) -- -- -- -- -- -- -- -- --    08/20/24 1032 -- 95 16 157/95 119 99 % None (Room air) Sitting -- No Pain              Pertinent Labs/Diagnostic Test Results:   Radiology:  MRI cervical spine w wo contrast   Final Interpretation by Fer Marrero DO (08/21 1022)      Cervical degenerative disc disease most prominent at the C3-4 and C6-7 levels. At C6-7 there is stable right  paramedian disc/osteophyte hypertrophic change and uncinate joint hypertrophic changes resulting in mild canal stenosis and foraminal narrowing.    There is foraminal narrowing at C3-4 which is new compared to the prior examination.               Workstation performed: [id number]         MRI brain w wo contrast   Final Interpretation by Fer Marrero DO (08/21 1017)      No acute intracranial abnormality.      A few small white matter hyperintensities on T2 and FLAIR imaging within the periphery of the cerebral hemispheres may represent mild early chronic microangiopathic change.      Workstation performed: [id number]         CTA head and neck with and without contrast   Final Interpretation by Collins Weaver MD (08/20 1314)      CT head:   -No acute vascular territory infarct, intracranial hemorrhage or mass effect. If there is persistent symptoms recommend MRI of the brain for further evaluation.      CTA head:   -No large vessel occlusion, high-grade stenosis or aneurysm.      CTA neck:   Right brachiocephalic and proximal left common carotid artery cannot be evaluated. Streak artifact limits evaluation of the distal V2 segments of the vertebral arteries. Otherwise:   -No high-grade stenosis or dissection or aneurysm.      Other:   -Sequelae of right thyroidectomy. Subcentimeter left thyroid lobe hypodensity. Correlate with thyroid ultrasound.               Workstation performed: [id number]         MRI thoracic spine w wo contrast    (Results Pending)     Cardiology:  ECG 12 lead   Final Result by Kelli Garcia DO (08/21 1639)   Normal sinus rhythm   Normal ECG   No previous ECGs available   Confirmed by Kelli Garcia ([id number]) on 8/21/2024 4:39:37 PM        GI:  No orders to display           Results from last 7 days   Lab Units 08/20/24  1107   WBC Thousand/uL 7.79   HEMOGLOBIN g/dL 13.5   HEMATOCRIT % 40.6   PLATELETS Thousands/uL 196   TOTAL NEUT ABS Thousands/µL 5.77         Results from last 7 days    Lab Units 08/20/24  1107   SODIUM mmol/L 137   POTASSIUM mmol/L 3.8   CHLORIDE mmol/L 104   CO2 mmol/L 26   ANION GAP mmol/L 7   BUN mg/dL 10   CREATININE mg/dL 0.76   EGFR ml/min/1.73sq m 90   CALCIUM mg/dL 9.0     Results from last 7 days   Lab Units 08/20/24  1107   AST U/L 13   ALT U/L 16   ALK PHOS U/L 28*   TOTAL PROTEIN g/dL 6.7   ALBUMIN g/dL 4.0   TOTAL BILIRUBIN mg/dL 0.48     Results from last 7 days   Lab Units 08/20/24  1036   POC GLUCOSE mg/dl 119     Results from last 7 days   Lab Units 08/20/24  1107   GLUCOSE RANDOM mg/dL 108         Results from last 7 days   Lab Units 08/21/24  0529   HEMOGLOBIN A1C % 5.3   EAG mg/dl 105                   Results from last 7 days   Lab Units 08/20/24  2209   CK TOTAL U/L 33             Results from last 7 days   Lab Units 08/20/24  1107   PROTIME seconds 12.7   INR  0.88   PTT seconds 23     Results from last 7 days   Lab Units 08/20/24  1107   TSH 3RD GENERATON uIU/mL 1.041                           ED Treatment-Medication Administration from 08/20/2024 1018 to 08/20/2024 1549         Date/Time Order Dose Route Action     08/20/2024 1215 iohexol (OMNIPAQUE) 350 MG/ML injection (MULTI-DOSE) 75 mL 75 mL Intravenous Given     08/20/2024 1338 aspirin tablet 325 mg 325 mg Oral Given            Past Medical History:   Diagnosis Date    Anxiety     Arthritis     Chronic pain     Neck. Numbness in fingers    Colon polyp     CTS (carpal tunnel syndrome)     Right    DDD (degenerative disc disease), cervical     DDD (degenerative disc disease), lumbar     Disease of thyroid gland     History of COVID-19     History of echocardiogram 11/26/2012    EF 55%, NWMA, Normal    Insomnia     Orbital floor (blow-out) closed fracture (HCC) 01/20/2011    Palpitations     Pneumonia 2008    WPW (Pedrito-Parkinson-White syndrome)      Present on Admission:   Anxiety   Insomnia   Right sided weakness   WPW (Pedrito-Parkinson-White syndrome)      Admitting Diagnosis: Tremor [R25.1]  Right  sided weakness [R53.1]  Stroke-like symptoms [R29.90]  Age/Sex: 53 y.o. female  Admission Orders:  Scheduled Medications:  traZODone, 300 mg, Oral, HS      Continuous IV Infusions:     PRN Meds:  acetaminophen, 650 mg, Oral, Q6H PRN  ALPRAZolam, 0.25 mg, Oral, BID PRN      ORDERS:   Stroke pathway  Neurochecks every 4 hours   Vital signs   Dysphagia screen   MRI thoracic spine w/wo contrast  PT/OT/ST eval     IP CONSULT TO CASE MANAGEMENT  IP CONSULT TO NUTRITION SERVICES  IP CONSULT TO NEUROLOGY    Network Utilization Review Department  ATTENTION: Please call with any questions or concerns to [phone] and carefully listen to the prompts so that you are directed to the right person. All voicemails are confidential.   For Discharge needs, contact Care Management DC Support Team at [phone] opt. [phone]  Send all requests for admission clinical reviews, approved or denied determinations and any other requests to dedicated fax number below belonging to the campus where the patient is receiving treatment. List of dedicated fax numbers for the Facilities:  FACILITY NAME UR FAX NUMBER   ADMISSION DENIALS (Administrative/Medical Necessity) [phone]   DISCHARGE SUPPORT TEAM (NETWORK) [phone]   PARENT CHILD HEALTH (Maternity/NICU/Pediatrics) [phone]   [facility] [phone]   [facility] [phone]   [facility] [phone]   [facility] [phone]   [facility] [phone]   [facility] [phone]   [facility] [phone]   [facility] [phone]   [facility] [phone]   [facility] [phone]   [facility] [phone]   [facility] [phone]

## 2024-08-22 NOTE — ASSESSMENT & PLAN NOTE
Presents with 2-week duration of right upper extremity and right lower extremity weakness, upper extremity weakness greater than lower extremity, associated with tremors in the upper and lower extremities, right lower extremity predominantly twitching.    Also noted temperature change in the RUE (intermittent coolness)  Some swallowing difficulties   Speech difficulties noted   Symptoms not getting any better and hence presented to the emergency department.  MRI Brain negative for CVA, mass, or demyelination   MRI C-Spine with mild right sided stenosis, doubt etiology  Autoimmune work up negative so far, but some studies pending   ?Myastenia ?Thoracic Outlet Syndrome (would not explain speech/swallowing problems)  MRI Thoracic Spine showed no explanation of symptoms  MRI Brachial Plexius showed no explanation of symptoms  Stable for discharge   Neurology following   Neuromuscular follow up needed   EMG recommended   Neuropathy labs pending

## 2024-08-22 NOTE — QUICK NOTE
Emely Singhdimitrisrudolph will need follow-up in in 4 weeks with neuromuscular team for her right upper extremity motor dysfunction in 60 minute appointment. She is currently scheduled for EMG/NCS on 9/18/2024 with Dr. Sanchez and sh

## 2024-08-22 NOTE — PLAN OF CARE
Problem: Neurological Deficit  Goal: Neurological status is stable or improving  Description: Interventions:  - Monitor and assess patient's level of consciousness, motor function, sensory function, and level of assistance needed for ADLs.   - Monitor and report changes from baseline. Collaborate with interdisciplinary team to initiate plan and implement interventions as ordered.   - Provide and maintain a safe environment.  - Consider seizure precautions.  - Consider fall precautions.  - Consider aspiration precautions.  - Consider bleeding precautions.  Outcome: Progressing     Problem: Activity Intolerance/Impaired Mobility  Goal: Mobility/activity is maintained at optimum level for patient  Description: Interventions:  - Assess and monitor patient  barriers to mobility and need for assistive/adaptive devices.  - Assess patient's emotional response to limitations.  - Collaborate with interdisciplinary team and initiate plans and interventions as ordered.  - Encourage independent activity per ability.  - Maintain proper body alignment.  - Perform active/passive rom as tolerated/ordered.  - Plan activities to conserve energy.  - Turn patient as appropriate  Outcome: Progressing     Problem: Communication Impairment  Goal: Ability to express needs and understand communication  Description: Assess patient's communication skills and ability to understand information.  Patient will demonstrate use of effective communication techniques, alternative methods of communication and understanding even if not able to speak.     - Encourage communication and provide alternate methods of communication as needed.  - Collaborate with case management/ for discharge needs.  - Include patient/family/caregiver in decisions related to communication.  Outcome: Progressing     Problem: Potential for Aspiration  Goal: Non-ventilated patient's risk of aspiration is minimized  Description: Assess and monitor vital signs,  respiratory status, and labs (WBC).  Monitor for signs of aspiration (tachypnea, cough, rales, wheezing, cyanosis, fever).    - Assess and monitor patient's ability to swallow.  - Place patient up in chair to eat if possible.  - HOB up at 90 degrees to eat if unable to get patient up into chair.  - Supervise patient during oral intake.   - Instruct patient/ family to take small bites.  - Instruct patient/ family to take small single sips when taking liquids.  - Follow patient-specific strategies generated by speech pathologist.  Outcome: Progressing  Goal: Ventilated patient's risk of aspiration is minimized  Description: Assess and monitor vital signs, respiratory status, airway cuff pressure, and labs (WBC).  Monitor for signs of aspiration (tachypnea, cough, rales, wheezing, cyanosis, fever).    - Elevate head of bed 30 degrees if patient has tube feeding.  - Monitor tube feeding.  Outcome: Progressing     Problem: Nutrition  Goal: Nutrition/Hydration status is improving  Description: Monitor and assess patient's nutrition/hydration status for malnutrition (ex- brittle hair, bruises, dry skin, pale skin and conjunctiva, muscle wasting, smooth red tongue, and disorientation). Collaborate with interdisciplinary team and initiate plan and interventions as ordered.  Monitor patient's weight and dietary intake as ordered or per policy. Utilize nutrition screening tool and intervene per policy. Determine patient's food preferences and provide high-protein, high-caloric foods as appropriate.     - Assist patient with eating.  - Allow adequate time for meals.  - Encourage patient to take dietary supplement as ordered.  - Collaborate with clinical nutritionist.  - Include patient/family/caregiver in decisions related to nutrition.  Outcome: Progressing     Problem: INFECTION - ADULT  Goal: Absence or prevention of progression during hospitalization  Description: INTERVENTIONS:  - Assess and monitor for signs and symptoms  of infection  - Monitor lab/diagnostic results  - Monitor all insertion sites, i.e. indwelling lines, tubes, and drains  - Monitor endotracheal if appropriate and nasal secretions for changes in amount and color  - [place] appropriate cooling/warming therapies per order  - Administer medications as ordered  - Instruct and encourage patient and family to use good hand hygiene technique  - Identify and instruct in appropriate isolation precautions for identified infection/condition  Outcome: Progressing  Goal: Absence of fever/infection during neutropenic period  Description: INTERVENTIONS:  - Monitor WBC    Outcome: Progressing

## 2024-08-22 NOTE — ASSESSMENT & PLAN NOTE
Because she has right-sided weakness    Plan:  She is currently pending T-spine imaging  Continue pain regimen of Tylenol  PT OT evaluate and treat

## 2024-08-23 ENCOUNTER — TRANSITIONAL CARE MANAGEMENT (OUTPATIENT)
Dept: FAMILY MEDICINE CLINIC | Facility: CLINIC | Age: 54
End: 2024-08-23

## 2024-08-23 LAB
C-ANCA TITR SER IF: NORMAL TITER
MYELOPEROXIDASE AB SER IA-ACNC: <0.2 UNITS (ref 0–0.9)
P-ANCA ATYPICAL TITR SER IF: NORMAL TITER
P-ANCA TITR SER IF: NORMAL TITER
PROTEINASE3 AB SER IA-ACNC: <0.2 UNITS (ref 0–0.9)

## 2024-08-23 NOTE — NURSING NOTE
Pt discharged and AVS reviewed with patient. Pt took all personal items from room. Pt ambulated with steady gait to Hospital exit. Pt in good spirits.

## 2024-08-26 LAB
METHYLMALONATE SERPL-SCNC: 116 NMOL/L (ref 0–378)
VIT B6 SERPL-MCNC: 7.6 UG/L (ref 3.4–65.2)

## 2024-08-29 DIAGNOSIS — F41.9 ANXIETY: ICD-10-CM

## 2024-08-29 RX ORDER — ALPRAZOLAM 0.25 MG
TABLET ORAL
Qty: 60 TABLET | Refills: 0 | Status: SHIPPED | OUTPATIENT
Start: 2024-08-29

## 2024-09-05 ENCOUNTER — OFFICE VISIT (OUTPATIENT)
Dept: FAMILY MEDICINE CLINIC | Facility: CLINIC | Age: 54
End: 2024-09-05
Payer: COMMERCIAL

## 2024-09-05 VITALS
OXYGEN SATURATION: 98 % | HEIGHT: 68 IN | HEART RATE: 88 BPM | WEIGHT: 133.8 LBS | SYSTOLIC BLOOD PRESSURE: 110 MMHG | TEMPERATURE: 97.9 F | DIASTOLIC BLOOD PRESSURE: 74 MMHG | BODY MASS INDEX: 20.28 KG/M2

## 2024-09-05 DIAGNOSIS — R29.898 WEAKNESS OF RIGHT LOWER EXTREMITY: ICD-10-CM

## 2024-09-05 DIAGNOSIS — M62.81 MUSCLE WEAKNESS OF RIGHT ARM: ICD-10-CM

## 2024-09-05 DIAGNOSIS — R53.1 RIGHT SIDED WEAKNESS: ICD-10-CM

## 2024-09-05 DIAGNOSIS — I45.6 WPW (WOLFF-PARKINSON-WHITE SYNDROME): ICD-10-CM

## 2024-09-05 PROCEDURE — 99495 TRANSJ CARE MGMT MOD F2F 14D: CPT | Performed by: NURSE PRACTITIONER

## 2024-09-05 PROCEDURE — 1111F DSCHRG MED/CURRENT MED MERGE: CPT | Performed by: NURSE PRACTITIONER

## 2024-09-05 NOTE — PROGRESS NOTES
Transition of Care Visit  Name: Emely Thibodeaux      : 1970      MRN: [id number]  Encounter Provider: ROHIT Suazo  Encounter Date: 2024   Encounter department: [facility]    Assessment & Plan   1. Right sided weakness  Assessment & Plan:  Presents with 2-week duration of right upper extremity and right lower extremity weakness, upper extremity weakness greater than lower extremity, associated with tremors in the upper and lower extremities, right lower extremity predominantly twitching.    Also noted temperature change in the RUE (intermittent coolness)  Some swallowing difficulties   Speech difficulties noted   Symptoms not getting any better and hence presented to the emergency department.  MRI Brain negative for CVA, mass, or demyelination   MRI C-Spine with mild right sided stenosis, doubt etiology  Autoimmune work up negative so far, but some studies pending   ?Myastenia ?Thoracic Outlet Syndrome (would not explain speech/swallowing problems)  MRI Thoracic Spine showed no explanation of symptoms  MRI Brachial Plexius showed no explanation of symptoms  Stable for discharge   Neurology following   Neuromuscular follow up needed   EMG recommended   Neuropathy labs pending     Patient referral placed for neurology and also ordered MRI to update her brain and see if she has had a CVA and also ordered a testing for Myasthenia   Orders:  -     MRI brain wo contrast; Future; Expected date: 2024  -     Ambulatory Referral to Neurology; Future; Expected date: 10/05/2024  -     Acetylcholine Receptor (AChR) Binding Antibodies Complete Panel With Reflex to MuSK Antibodies; Future  -     Lyme Total AB W Reflex to IGM/IGG; Future  2. WPW (Pedrito-Parkinson-White syndrome)  Assessment & Plan:  History of WPW, palpitations, patient seen cardiology in 2024 and underwent echocardiogram, exercise stress test which came back within normal limit.  Avoid AV monica blocking  agents.     3. Muscle weakness of right arm  -     MRI brain wo contrast; Future; Expected date: 09/05/2024  -     Ambulatory Referral to Neurology; Future; Expected date: 10/05/2024  -     Acetylcholine Receptor (AChR) Binding Antibodies Complete Panel With Reflex to MuSK Antibodies; Future  -     Lyme Total AB W Reflex to IGM/IGG; Future  4. Weakness of right lower extremity  -     MRI brain wo contrast; Future; Expected date: 09/05/2024  -     Ambulatory Referral to Neurology; Future; Expected date: 10/05/2024  -     Acetylcholine Receptor (AChR) Binding Antibodies Complete Panel With Reflex to MuSK Antibodies; Future  -     Lyme Total AB W Reflex to IGM/IGG; Future         History of Present Illness     Transitional Care Management Review:   Emely Thibodeaux is a 53 y.o. female here for TCM follow up.     During the TCM phone call patient stated:  TCM Call     Date and time call was made  8/23/2024 10:40 AM    Hospital care reviewed  Records reviewed    Patient was hospitialized at  [facility]    Date of Admission  08/20/24    Date of discharge  08/22/24    Diagnosis  Right sided weakness    Disposition  Home    Were the patients medications reviewed and updated  No    Current Symptoms  None      TCM Call     Post hospital issues  None    Should patient be enrolled in anticoag monitoring?  No    Scheduled for follow up?  Yes    I have advised the patient to call PCP with any new or worsening symptoms  KAYLA MA        Hospital Course: 8/20-8/22  Emely Thibodeaux is a 53 y.o. female patient who originally presented to the hospital on 8/20/2024 due to right sided weakness. She was admitted for stroke work up which was negative. Neurology was consulted. Neuropathy labs were ordered and of the ones that have resulted they are negative. MRI brain was negative for stroke, demyelination, or mass. C-spine MRI did not have significant disease. T-spine and Brachial plexus MRI showed no explanation for her  symptomology. Ultimately she will require neuromuscular follow up for EMG testing and further management from there. She is scheduled for this next Friday. She was discharged home and referred to neuromuscular and PT    PCP Hospital Follow Up 9/5/2024  Patient here today for her hospital follow and reports that she started about the middle of July 2024 she started noticing that she could not put on her flip flop and then noticed a pain in her right arm and felt like her elbow and had gone to orthopedics and also was having tremoring and was getting worse over the course of a week and then was advised to see neurology and then went to the ED and was kept and admitted and evaluated and was evaluated not clear about what was going on and was discharged and told to follow up with a neurosurgeon and had testing.       Review of Systems   Constitutional:  Negative for activity change, appetite change, chills, diaphoresis, fatigue, fever and unexpected weight change.   HENT:  Positive for congestion. Negative for dental problem, drooling, ear discharge, ear pain, facial swelling, hearing loss, mouth sores, nosebleeds, postnasal drip, rhinorrhea, sinus pressure, sinus pain, sneezing, sore throat, tinnitus, trouble swallowing and voice change.         Had an ear infection prior to the symptom onset and was given abx and steroids and resolved   Eyes:  Positive for visual disturbance. Negative for photophobia, pain, discharge, redness and itching.        Change in her visual acuity    Respiratory:  Negative for apnea, cough, choking, chest tightness, shortness of breath, wheezing and stridor.         HANNON and when speaking long times    Cardiovascular:  Negative for chest pain, palpitations and leg swelling.   Gastrointestinal:  Negative for abdominal distention, abdominal pain, anal bleeding, blood in stool, constipation, diarrhea, nausea, rectal pain and vomiting.   Endocrine: Negative.    Genitourinary: Negative.   "  Musculoskeletal:  Positive for gait problem and myalgias. Negative for arthralgias, back pain, joint swelling, neck pain and neck stiffness.        Weakness in her right upper arm and leg    Skin:  Positive for color change.   Allergic/Immunologic: Negative for environmental allergies, food allergies and immunocompromised state.   Neurological:  Positive for numbness. Negative for dizziness, facial asymmetry, light-headedness and headaches.        Right finger numbness 4th and 5 th digits   Hematological:  Bruises/bleeds easily.   Psychiatric/Behavioral: Negative.       Objective     /74 (BP Location: Right arm, Patient Position: Sitting)   Pulse 88   Temp 97.9 °F (36.6 °C) (Temporal)   Ht 5' 7.5\" (1.715 m)   Wt 60.7 kg (133 lb 12.8 oz)   LMP 08/06/2024 (Approximate)   SpO2 98%   BMI 20.65 kg/m²     Physical Exam  Vitals reviewed.   Constitutional:       General: She is not in acute distress.     Appearance: She is well-developed.   HENT:      Head: Normocephalic and atraumatic.      Right Ear: Tympanic membrane normal.      Left Ear: Tympanic membrane normal.      Nose: Nose normal.      Mouth/Throat:      Mouth: Mucous membranes are moist.   Eyes:      Pupils: Pupils are equal, round, and reactive to light.   Neck:      Thyroid: No thyromegaly.   Cardiovascular:      Rate and Rhythm: Normal rate and regular rhythm.      Heart sounds: Normal heart sounds. No murmur heard.  Pulmonary:      Effort: Pulmonary effort is normal. No respiratory distress.      Breath sounds: Normal breath sounds. No wheezing.   Abdominal:      General: Bowel sounds are normal.      Palpations: Abdomen is soft.   Musculoskeletal:         General: Normal range of motion.      Cervical back: Normal range of motion.   Skin:     General: Skin is warm and dry.   Neurological:      Mental Status: She is alert and oriented to person, place, and time.      GCS: GCS eye subscore is 4. GCS verbal subscore is 5. GCS motor subscore is " 6.      Cranial Nerves: Cranial nerves 2-12 are intact.      Sensory: Sensation is intact.      Motor: Weakness, tremor and abnormal muscle tone present.      Deep Tendon Reflexes: Reflexes abnormal.      Reflex Scores:       Tricep reflexes are 2+ on the right side and 4+ on the left side.       Bicep reflexes are 2+ on the right side and 4+ on the left side.       Brachioradialis reflexes are 2+ on the right side and 4+ on the left side.       Patellar reflexes are 2+ on the right side and 4+ on the left side.       Achilles reflexes are 2+ on the right side and 4+ on the left side.      Medications have been reviewed by provider in current encounter

## 2024-09-05 NOTE — ASSESSMENT & PLAN NOTE
History of WPW, palpitations, patient seen cardiology in July 2024 and underwent echocardiogram, exercise stress test which came back within normal limit.  Avoid AV monica blocking agents.     
Presents with 2-week duration of right upper extremity and right lower extremity weakness, upper extremity weakness greater than lower extremity, associated with tremors in the upper and lower extremities, right lower extremity predominantly twitching.    Also noted temperature change in the RUE (intermittent coolness)  Some swallowing difficulties   Speech difficulties noted   Symptoms not getting any better and hence presented to the emergency department.  MRI Brain negative for CVA, mass, or demyelination   MRI C-Spine with mild right sided stenosis, doubt etiology  Autoimmune work up negative so far, but some studies pending   ?Myastenia ?Thoracic Outlet Syndrome (would not explain speech/swallowing problems)  MRI Thoracic Spine showed no explanation of symptoms  MRI Brachial Plexius showed no explanation of symptoms  Stable for discharge   Neurology following   Neuromuscular follow up needed   EMG recommended   Neuropathy labs pending     Patient referral placed for neurology and also ordered MRI to update her brain and see if she has had a CVA and also ordered a testing for Myasthenia   
no

## 2024-09-09 ENCOUNTER — APPOINTMENT (OUTPATIENT)
Dept: LAB | Facility: HOSPITAL | Age: 54
End: 2024-09-09
Payer: COMMERCIAL

## 2024-09-09 ENCOUNTER — TELEPHONE (OUTPATIENT)
Age: 54
End: 2024-09-09

## 2024-09-09 DIAGNOSIS — M62.81 MUSCLE WEAKNESS OF RIGHT ARM: ICD-10-CM

## 2024-09-09 DIAGNOSIS — R53.1 RIGHT SIDED WEAKNESS: ICD-10-CM

## 2024-09-09 DIAGNOSIS — R29.898 WEAKNESS OF RIGHT LOWER EXTREMITY: ICD-10-CM

## 2024-09-09 DIAGNOSIS — R53.1 ASTHENIA: ICD-10-CM

## 2024-09-09 LAB — B BURGDOR IGG+IGM SER QL IA: NEGATIVE

## 2024-09-09 PROCEDURE — 36415 COLL VENOUS BLD VENIPUNCTURE: CPT

## 2024-09-09 PROCEDURE — 86618 LYME DISEASE ANTIBODY: CPT

## 2024-09-09 NOTE — TELEPHONE ENCOUNTER
Laura Paredes  Cc: Laura Demarco  Per chart- pt is currently scheduled on 10/4 at 230pm w/ Yuriy. This apt was made before the pt went to the hospital.    Per notes below this appt needs to canceled and pt needs a HFU with a  Neuromuscular Attending only.    Called pt in attempt to advise and r/s but no answer. LMOM and call back phone number.      Sent a [employer] message to Selena as well.            HFU/8/20/2024 - 8/22/2024 (2 days)  [facility]/Right sided weakness      Fausto Newby,   Resident  Neurology  Date of Service: 8/21/2024  4:59 PM      Per Notes:    Emely Thibodeaux will need follow up in in 4 weeks with neuromuscular Attending only . She is currently scheduled for EMG on 9/18/2024  Pending for discharge: T Spine

## 2024-09-09 NOTE — TELEPHONE ENCOUNTER
1ST ATTEMPT,     Called pt no answer, LMOM.    Patient has to be re-scheduled as an HFU L WITH NEUROMUSCULAR ATTENDING ONLY!!!      Thank you,     Selena     HFU/ DEBBY ALMAGUER /#RIGHT UPPER EXTREMITY MOTOR DYSFUNCTION     DC- 8/22/2024- HOME      Jordan Thibodeaux will need follow up in in 4 weeks with neuromuscular Attending only .

## 2024-09-11 ENCOUNTER — OFFICE VISIT (OUTPATIENT)
Dept: NEUROLOGY | Facility: CLINIC | Age: 54
End: 2024-09-11
Payer: COMMERCIAL

## 2024-09-11 VITALS
SYSTOLIC BLOOD PRESSURE: 120 MMHG | WEIGHT: 135 LBS | BODY MASS INDEX: 21.19 KG/M2 | HEART RATE: 73 BPM | DIASTOLIC BLOOD PRESSURE: 70 MMHG | HEIGHT: 67 IN

## 2024-09-11 DIAGNOSIS — R29.898 RIGHT ARM WEAKNESS: ICD-10-CM

## 2024-09-11 DIAGNOSIS — M62.81 ACUTE RIGHT-SIDED MUSCLE WEAKNESS: ICD-10-CM

## 2024-09-11 DIAGNOSIS — R53.1 RIGHT SIDED WEAKNESS: Primary | ICD-10-CM

## 2024-09-11 PROCEDURE — 99205 OFFICE O/P NEW HI 60 MIN: CPT | Performed by: PSYCHIATRY & NEUROLOGY

## 2024-09-11 NOTE — ASSESSMENT & PLAN NOTE
Ms Thibodeaux has no objective deficits on my examination today.  However, by history she has symptoms that are essentially exclusively right-sided.  This is not suggestive of a neuromuscular disorder.  Some degree of asymmetry can occur in neuromuscular disorders, but I would not expect symptoms that are essentially unilateral.  Electrodiagnostic testing did not show evidence to suggest a neuromuscular disorder (with the exception of very minimal carpal tunnel syndrome not accounting for her symptoms) with the repetitive nerve stimulation testing that was performed being normal.  I assured her that she does not have a neuromuscular disease.  The exclusively right-sided nature of her symptoms suggest cervical spine or intracranial disease, with the change in her speech potentially favoring intracranial disease.  This was not confirmed with a series of MRI studies.  Repeat cranial MRI has been ordered previously which is certainly reasonable.  However, a neuromuscular disease does not explain her symptoms.  She is understandably frustrated but I answered all of her questions to her satisfaction.  If there are changes in her case that change the differential diagnosis I can always reevaluate her.  I spent 65 minutes on this visit.

## 2024-09-11 NOTE — PROGRESS NOTES
"Please note: this note was created with voice recognition software. Occasional wrong word or \"sound alike\" substitutions may occur due to the inherent limitations of voice recognition software. Read the chart carefully and recognize (using context) where substitutions may have occurred. I am available to discuss any questions.       1. Right sided weakness  Assessment & Plan:  Ms Thibodeaux has no objective deficits on my examination today.  However, by history she has symptoms that are essentially exclusively right-sided.  This is not suggestive of a neuromuscular disorder.  Some degree of asymmetry can occur in neuromuscular disorders, but I would not expect symptoms that are essentially unilateral.  Electrodiagnostic testing did not show evidence to suggest a neuromuscular disorder (with the exception of very minimal carpal tunnel syndrome not accounting for her symptoms) with the repetitive nerve stimulation testing that was performed being normal.  I assured her that she does not have a neuromuscular disease.  The exclusively right-sided nature of her symptoms suggest cervical spine or intracranial disease, with the change in her speech potentially favoring intracranial disease.  This was not confirmed with a series of MRI studies.  Repeat cranial MRI has been ordered previously which is certainly reasonable.  However, a neuromuscular disease does not explain her symptoms.  She is understandably frustrated but I answered all of her questions to her satisfaction.  If there are changes in her case that change the differential diagnosis I can always reevaluate her.  I spent 65 minutes on this visit.  2. Right arm weakness  -     Ambulatory Referral to Neurology  3. Acute right-sided muscle weakness  -     Ambulatory Referral to Neurology      Problem List Items Addressed This Visit       Right sided weakness - Primary     Ms Thibodeaux has no objective deficits on my examination today.  However, by history she has " symptoms that are essentially exclusively right-sided.  This is not suggestive of a neuromuscular disorder.  Some degree of asymmetry can occur in neuromuscular disorders, but I would not expect symptoms that are essentially unilateral.  Electrodiagnostic testing did not show evidence to suggest a neuromuscular disorder (with the exception of very minimal carpal tunnel syndrome not accounting for her symptoms) with the repetitive nerve stimulation testing that was performed being normal.  I assured her that she does not have a neuromuscular disease.  The exclusively right-sided nature of her symptoms suggest cervical spine or intracranial disease, with the change in her speech potentially favoring intracranial disease.  This was not confirmed with a series of MRI studies.  Repeat cranial MRI has been ordered previously which is certainly reasonable.  However, a neuromuscular disease does not explain her symptoms.  She is understandably frustrated but I answered all of her questions to her satisfaction.  If there are changes in her case that change the differential diagnosis I can always reevaluate her.  I spent 65 minutes on this visit.          Other Visit Diagnoses       Right arm weakness        Acute right-sided muscle weakness                Problem List       Annual physical exam    Anxiety    Benzodiazepine use agreement exists    Gastroesophageal reflux disease without esophagitis    Insomnia    Muscle weakness of right arm    Osteoarthritis of carpometacarpal (CMC) joint of thumb    Right sided weakness    Current Assessment & Plan     Ms Thibodeaux has no objective deficits on my examination today.  However, by history she has symptoms that are essentially exclusively right-sided.  This is not suggestive of a neuromuscular disorder.  Some degree of asymmetry can occur in neuromuscular disorders, but I would not expect symptoms that are essentially unilateral.  Electrodiagnostic testing did not show evidence  "to suggest a neuromuscular disorder (with the exception of very minimal carpal tunnel syndrome not accounting for her symptoms) with the repetitive nerve stimulation testing that was performed being normal.  I assured her that she does not have a neuromuscular disease.  The exclusively right-sided nature of her symptoms suggest cervical spine or intracranial disease, with the change in her speech potentially favoring intracranial disease.  This was not confirmed with a series of MRI studies.  Repeat cranial MRI has been ordered previously which is certainly reasonable.  However, a neuromuscular disease does not explain her symptoms.  She is understandably frustrated but I answered all of her questions to her satisfaction.  If there are changes in her case that change the differential diagnosis I can always reevaluate her.  I spent 65 minutes on this visit.         Thyroid disorder    Thyroid nodule    Vitamin D deficiency    Weakness of right lower extremity    WPW (Pedrito-Parkinson-White syndrome)     Other Visit Diagnoses       Right arm weakness        Acute right-sided muscle weakness                  I had the pleasure of seeing Emely Thibodeaux, a 53 y.o. female, for neuromuscular consultation. The referral was for weakness.     Her \"right side has been all messed up\" for approximately 5 weeks.  Her symptoms started with aching in the right shoulder and from the elbow distally.  Given history of rotator cuff repair she first saw her orthopedist who said that this had nothing to do with her problem.  She then noticed difficulty putting on her flip-flop onto the right foot because of difficulty maneuvering it with her toes.  Since then it has been more difficult to move the fingers of the right hand.  She has occasional twitching of the right thigh and sometimes of the right arm.  The symptoms seem to resolve when she wakes up in the morning but occur after several minutes.  She notes weakness of the arm, worse " "distally than proximally.  Except for very minimal rare cramping of the left fifth finger, all of her symptoms have been exclusively right-sided.  There is no weakness or numbness on the left.  Sometimes at night she has diminished sensation of the right third finger, with the fourth and fifth somewhat involved as well.  The symptoms may occur if she is on the telephone or working on her computer.  Occasionally she may stammer or \"sound like I had too much wine\" with very minimal slurring.  There is no change in her swallowing; there is no bladder or bowel incontinence.  While walking occasionally she thinks that she may slap her right foot a bit but without a steppage component.  She was admitted to the hospital where MRI of the brain was performed which showed very minimal nonspecific white matter changes.  MRI of the cervical spine showed degenerative changes but without a severe stenotic lesion.  I reviewed the films myself and agree.  MRI of the thoracic spine showed only a syrinx not felt to account for her symptoms; MRI of the brachial plexus was unremarkable.  She had electrodiagnostic studies of the right arm last month which were normal with the exception of minimal carpal tunnel syndrome.  Repetitive nerve stimulation testing of the ADM, APB, and EDC muscles were normal.      Past Medical History:   Diagnosis Date    Anxiety     Arthritis     Chronic pain     Neck. Numbness in fingers    Colon polyp     CTS (carpal tunnel syndrome)     Right    DDD (degenerative disc disease), cervical     DDD (degenerative disc disease), lumbar     Disease of thyroid gland     History of COVID-19     History of echocardiogram 11/26/2012    EF 55%, NWMA, Normal    Insomnia     Orbital floor (blow-out) closed fracture (HCC) 01/20/2011    Palpitations     Pneumonia 2008    WPW (Pedrito-Parkinson-White syndrome)        Past Surgical History:   Procedure Laterality Date    AUGMENTATION MAMMAPLASTY Bilateral 2010    AUGMENTATION " MAMMAPLASTY Bilateral 2022    redone    BREAST SURGERY Bilateral     Augmentation    EPIDURAL BLOCK INJECTION N/A 2018    Procedure: CERVICAL EPIDURAL STEROID INJECTION;  Surgeon: Kye Webster MD;  Location: [facility];  Service: Pain Management     NC DSTR NROLYTC AGNT PARVERTEB FCT SNGL LMBR/SACRAL Right 12/15/2016    Procedure: LUMBAR RADIOFREQUENCY LESIONING ;  Surgeon: Kye Webster MD;  Location: [facility];  Service: Pain Management     NC NJX DX/THER AGT PVRT FACET JT CRV/THRC 1 LEVEL Right 2018    Procedure: C4-5 C5-6 C7-T1 CERVICAL MEDIAL BRANCH BLOCK;  Surgeon: Kye Webster MD;  Location: [facility];  Service: Pain Management     ROTATOR CUFF REPAIR Right     THYROIDECTOMY, PARTIAL      TRANSUMBILICAL AUGMENTATION MAMMAPLASTY Bilateral        Sulfa antibiotics    Social History     Tobacco Use   Smoking Status Former    Current packs/day: 0.00    Average packs/day: 0.3 packs/day for 10.0 years (2.5 ttl pk-yrs)    Types: Cigarettes    Start date: 2008    Quit date: 2018    Years since quittin.3   Smokeless Tobacco Never       Social History     Substance and Sexual Activity   Alcohol Use Not Currently    Alcohol/week: 1.0 standard drink of alcohol    Types: 1 Cans of beer per week    Comment: few times week       Social History     Substance and Sexual Activity   Drug Use No       Family History   Problem Relation Age of Onset    Endometrial cancer Mother 70    Arthritis Father     No Known Problems Sister     No Known Problems Daughter     No Known Problems Daughter     Lung cancer Maternal Grandmother     No Known Problems Paternal Grandmother     Lung cancer Maternal Aunt     Breast cancer Maternal Aunt 50    Breast cancer Maternal Aunt 58    No Known Problems Maternal Aunt     Cancer Paternal Aunt     Colon cancer Paternal Aunt 58    Breast cancer Paternal Aunt 45    Cancer Paternal Aunt     No Known Problems Paternal Aunt     Thyroid cancer Cousin          Current  Outpatient Medications:     albuterol (PROVENTIL HFA,VENTOLIN HFA) 90 mcg/act inhaler, Inhale 2 puffs every 6 (six) hours as needed for wheezing, Disp: 18 g, Rfl: 2    ALPRAZolam (XANAX) 0.25 mg tablet, take 1 tablet by mouth twice a day if needed for anxiety, Disp: 60 tablet, Rfl: 0    etonogestrel-ethinyl estradiol (NuvaRing) 0.12-0.015 MG/24HR vaginal ring, Insert vaginally and leave in place for 3 consecutive weeks, then remove for 1 week., Disp: 3 each, Rfl: 5    traZODone (DESYREL) 100 mg tablet, take 3 tablets by mouth daily at bedtime, Disp: 270 tablet, Rfl: 1    Appointment on 09/09/2024   Component Date Value Ref Range Status    Lyme Total Antibodies 09/09/2024 Negative  Negative Final   Admission on 08/20/2024, Discharged on 08/22/2024   Component Date Value Ref Range Status    POC Glucose 08/20/2024 119  65 - 140 mg/dl Final    WBC 08/20/2024 7.79  4.31 - 10.16 Thousand/uL Final    RBC 08/20/2024 4.42  3.81 - 5.12 Million/uL Final    Hemoglobin 08/20/2024 13.5  11.5 - 15.4 g/dL Final    Hematocrit 08/20/2024 40.6  34.8 - 46.1 % Final    MCV 08/20/2024 92  82 - 98 fL Final    MCH 08/20/2024 30.5  26.8 - 34.3 pg Final    MCHC 08/20/2024 33.3  31.4 - 37.4 g/dL Final    RDW 08/20/2024 13.6  11.6 - 15.1 % Final    MPV 08/20/2024 10.3  8.9 - 12.7 fL Final    Platelets 08/20/2024 196  149 - 390 Thousands/uL Final    nRBC 08/20/2024 0  /100 WBCs Final    Segmented % 08/20/2024 72  43 - 75 % Final    Immature Grans % 08/20/2024 1  0 - 2 % Final    Lymphocytes % 08/20/2024 18  14 - 44 % Final    Monocytes % 08/20/2024 6  4 - 12 % Final    Eosinophils Relative 08/20/2024 2  0 - 6 % Final    Basophils Relative 08/20/2024 1  0 - 1 % Final    Absolute Neutrophils 08/20/2024 5.77  1.85 - 7.62 Thousands/µL Final    Absolute Immature Grans 08/20/2024 0.04  0.00 - 0.20 Thousand/uL Final    Absolute Lymphocytes 08/20/2024 1.38  0.60 - 4.47 Thousands/µL Final    Absolute Monocytes 08/20/2024 0.44  0.17 - 1.22 Thousand/µL  Final    Eosinophils Absolute 08/20/2024 0.12  0.00 - 0.61 Thousand/µL Final    Basophils Absolute 08/20/2024 0.04  0.00 - 0.10 Thousands/µL Final    Sodium 08/20/2024 137  135 - 147 mmol/L Final    Potassium 08/20/2024 3.8  3.5 - 5.3 mmol/L Final    Chloride 08/20/2024 104  96 - 108 mmol/L Final    CO2 08/20/2024 26  21 - 32 mmol/L Final    ANION GAP 08/20/2024 7  4 - 13 mmol/L Final    BUN 08/20/2024 10  5 - 25 mg/dL Final    Creatinine 08/20/2024 0.76  0.60 - 1.30 mg/dL Final    Standardized to IDMS reference method    Glucose 08/20/2024 108  65 - 140 mg/dL Final    If the patient is fasting, the ADA then defines impaired fasting glucose as > 100 mg/dL and diabetes as > or equal to 123 mg/dL.    Calcium 08/20/2024 9.0  8.4 - 10.2 mg/dL Final    AST 08/20/2024 13  13 - 39 U/L Final    ALT 08/20/2024 16  7 - 52 U/L Final    Specimen collection should occur prior to Sulfasalazine administration due to the potential for falsely depressed results.     Alkaline Phosphatase 08/20/2024 28 (L)  34 - 104 U/L Final    Total Protein 08/20/2024 6.7  6.4 - 8.4 g/dL Final    Albumin 08/20/2024 4.0  3.5 - 5.0 g/dL Final    Total Bilirubin 08/20/2024 0.48  0.20 - 1.00 mg/dL Final    Use of this assay is not recommended for patients undergoing treatment with eltrombopag due to the potential for falsely elevated results.  N-acetyl-p-benzoquinone imine (metabolite of Acetaminophen) will generate erroneously low results in samples for patients that have taken an overdose of Acetaminophen.    eGFR 08/20/2024 90  ml/min/1.73sq m Final    Protime 08/20/2024 12.7  12.3 - 15.0 seconds Final    INR 08/20/2024 0.88  0.85 - 1.19 Final    PTT 08/20/2024 23  23 - 34 seconds Final    Therapeutic Heparin Range =  60-90 seconds    TSH 3RD GENERATON 08/20/2024 1.041  0.450 - 4.500 uIU/mL Final    The recommended reference ranges for TSH during pregnancy are as follows:   First trimester 0.100 to 2.500 uIU/mL   Second trimester  0.200 to 3.000  uIU/mL   Third trimester 0.300 to 3.000 uIU/m    Note: Normal ranges may not apply to patients who are transgender, non-binary, or whose legal sex, sex at birth, and gender identity differ.  Adult TSH (3rd generation) reference range follows the recommended guidelines of the American Thyroid Association, January, 2020.    Vitamin B-12 08/20/2024 312  180 - 914 pg/mL Final    Total CK 08/20/2024 33  26 - 192 U/L Final    CRP 08/20/2024 <1.0  <3.0 mg/L Final    Sed Rate 08/20/2024 1  0 - 29 mm/hour Final    Cholesterol 08/21/2024 175  See Comment mg/dL Final    Cholesterol:         Pediatric <18 Years        Desirable          <170 mg/dL      Borderline High    170-199 mg/dL      High               >=200 mg/dL        Adult >=18 Years            Desirable         <200 mg/dL      Borderline High   200-239 mg/dL      High              >239 mg/dL      Triglycerides 08/21/2024 93  See Comment mg/dL Final    Triglyceride:     0-9Y            <75mg/dL     10Y-17Y         <90 mg/dL       >=18Y     Normal          <150 mg/dL     Borderline High 150-199 mg/dL     High            200-499 mg/dL        Very High       >499 mg/dL    Specimen collection should occur prior to Metamizole administration due to the potential for falsely depressed results.    HDL, Direct 08/21/2024 94  >=50 mg/dL Final    LDL Calculated 08/21/2024 62  0 - 100 mg/dL Final    LDL Cholesterol:     Optimal           <100 mg/dl     Near Optimal      100-129 mg/dl     Above Optimal       Borderline High 130-159 mg/dl       High            160-189 mg/dl       Very High       >189 mg/dl         This screening LDL is a calculated result.   It does not have the accuracy of the Direct Measured LDL in the monitoring of patients with hyperlipidemia and/or statin therapy.   Direct Measure LDL ([id number]) must be ordered separately in these patients.    Hemoglobin A1C 08/21/2024 5.3  Normal 4.0-5.6%; PreDiabetic 5.7-6.4%; Diabetic >=6.5%; Glycemic control for adults with  diabetes <7.0% % Final    EAG 08/21/2024 105  mg/dl Final    Vitamin B6 08/21/2024 7.6  3.4 - 65.2 ug/L Final                                 Deficiency:         <3.4                               Marginal:      3.4 - 5.1                               Adequate:           >5.1    A/G Ratio 08/21/2024 1.58  1.10 - 1.80 Final    Albumin Electrophoresis 08/21/2024 61.3  48.0 - 70.0 % Final    Albumin CONC 08/21/2024 3.43  3.20 - 5.10 g/dl Final    Alpha 1 08/21/2024 4.4  1.8 - 7.0 % Final    ALPHA 1 CONC 08/21/2024 0.25  0.15 - 0.47 g/dL Final    Alpha 2 08/21/2024 9.5  5.9 - 14.9 % Final    ALPHA 2 CONC 08/21/2024 0.53  0.42 - 1.04 g/dL Final    Beta-1 08/21/2024 7.1  4.7 - 7.7 % Final    BETA 1 CONC 08/21/2024 0.40  0.31 - 0.57 g/dL Final    Beta-2 08/21/2024 4.8  3.1 - 7.9 % Final    BETA 2 CONC 08/21/2024 0.27  0.20 - 0.58 g/dL Final    Gamma Globulin 08/21/2024 12.9  6.9 - 22.3 % Final    GAMMA CONC 08/21/2024 0.72  0.40 - 1.66 g/dL Final    Total Protein 08/21/2024 5.6 (L)  6.4 - 8.2 g/dL Final    SPEP Interpretation 08/21/2024 See Comment   Final    No monoclonal bands noted. Reviewed by:  Radha Hilario MD  **Electronic Signature**    C-ANCA 08/21/2024 <1:20  Neg:<1:20 titer Final    Atypical pANCA 08/21/2024 <1:20  Neg:<1:20 titer Final    The atypical pANCA pattern has been observed in a significant  percentage of patients with ulcerative colitis, primary sclerosing  cholangitis and autoimmune hepatitis.    MPO AB 08/21/2024 <0.2  0.0 - 0.9 units Final    CA-3 AB 08/21/2024 <0.2  0.0 - 0.9 units Final    P-ANCA 08/21/2024 <1:20  Neg:<1:20 titer Final    The presence of positive fluorescence exhibiting P-ANCA or C-ANCA  patterns alone is not specific for the diagnosis of Wegener's  Granulomatosis (WG) or microscopic polyangiitis. Decisions about  treatment should not be based solely on ANCA IFA results.  The  International ANCA Group Consensus recommends follow up testing of  positive sera with both CA-3 and MPO-ANCA  enzyme immunoassays. As  many as 5% serum samples are positive only by EIA.  Ref. AM J Clin Pathol 1999;111:507-513.    JALEESA 08/21/2024 Negative  Negative Final    Rheumatoid Factor 08/21/2024 Negative  Negative Final    Methylmalonic Acid, S 08/21/2024 116  0 - 378 nmol/L Final    Scleroderma SCL-70 08/21/2024 0.3  0.0 - 0.9 AI Final    SS-A (RO) Ab 08/21/2024 <0.2  0.0 - 0.9 AI Final    SS-B (LA) Ab 08/21/2024 <0.2  0.0 - 0.9 AI Final    Lyme Total Antibodies 08/21/2024 Negative  Negative Final    Folate 08/21/2024 10.4  >5.9 ng/mL Final    The World Health Organization has determined deficient folate concentrations are considered to be <4.0 ng/mL.    Ventricular Rate 08/20/2024 87  BPM Final    Atrial Rate 08/20/2024 87  BPM Final    FL Interval 08/20/2024 122  ms Final    QRSD Interval 08/20/2024 84  ms Final    QT Interval 08/20/2024 342  ms Final    QTC Interval 08/20/2024 411  ms Final    P Axis 08/20/2024 67  degrees Final    QRS Axis 08/20/2024 76  degrees Final    T Wave Axis 08/20/2024 67  degrees Final   Clinical Support on 07/23/2024   Component Date Value Ref Range Status    TB Skin Test 07/25/2024 Negative   Final    Induration 07/25/2024 0  mm Final   Hospital Outpatient Visit on 07/18/2024   Component Date Value Ref Range Status    Case Report 07/18/2024    Final                    Value:Surgical Pathology Report                         Case: [id number]                                  Authorizing Provider:  Millie Doherty MD       Collected:           07/18/2024 1417              Ordering Location:     [facility]        Received:            07/18/2024 [facility]                                     [facility]                                                    Pathologist:           Kristel Sales MD                                                         Specimens:   A) - Polyp, Colorectal, cold polypectomy: transverse colon polyp                                    B) - Polyp,  "Colorectal, cold polypectomy: rectal polyp                                     Final Diagnosis 07/18/2024    Final                    Value:A. Transverse colon polyp (biopsy):  - Tubular adenoma  - No high grade dysplasia     B. Rectal polyp (biopsy):  - Hyperplastic polyp   - Negative for dysplasia     Interpretation performed at [address] [address]        Additional Information 07/18/2024    Final                    Value:All reported additional testing was performed with appropriately reactive controls.  These tests were developed and their performance characteristics determined by [facility] or appropriate performing facility, though some tests may be performed on tissues which have not been validated for performance characteristics (such as staining performed on alcohol exposed cell blocks and decalcified tissues).  Results should be interpreted with caution and in the context of the patients’ clinical condition. These tests may not be cleared or approved by the U.S. Food and Drug Administration, though the FDA has determined that such clearance or approval is not necessary. These tests are used for clinical purposes and they should not be regarded as investigational or for research. This laboratory has been approved by CLIA 88, designated as a high-complexity laboratory and is qualified to perform these tests.  .      Synoptic Checklist 07/18/2024    Final                    Value:                            COLON/RECTUM POLYP FORM - GI - All Specimens                                                                                     :    Adenoma(s)                                                         :    Other      Gross Description 07/18/2024    Final                    Value:A. The specimen is received in formalin, labeled with the patient's name and hospital number, and is designated \" transverse colon polyp\".  The specimen consists of 1 tan soft " "tissue fragment measuring 0.3 cm in greatest dimension.  Also present in container are presumed food particles/debris.  Entirely submitted. One screened cassette.  B. The specimen is received in formalin, labeled with the patient's name and hospital number, and is designated \" rectal polyp\".  The specimen consists of 1 tan soft tissue fragment measuring 0.2 cm in greatest dimension.  Entirely submitted. One screened cassette.    Note: The estimated total formalin fixation time based upon information provided by the submitting clinician and the standard processing schedule is under 72 hours.      [facility] Outpatient Visit on 07/17/2024   Component Date Value Ref Range Status    Baseline HR 07/17/2024 75  bpm Final    Baseline BP 07/17/2024 118/74  mmHg Final    O2 sat rest 07/17/2024 98  % Final    Stress peak HR 07/17/2024 162  bpm Final    Post peak BP 07/17/2024 126  mmHg Final    O2 sat peak 07/17/2024 84  % Final    Recovery HR 07/17/2024 90  bpm Final    Recovery BP 07/17/2024 110/70  mmHg Final    Max HR 07/17/2024 162  bpm Final    Max HR Percent 07/17/2024 96  % Final    Exercise duration (min) 07/17/2024 7  min Final    Exercise duration (sec) 07/17/2024 0  sec Final    Estimated workload 07/17/2024 10.1  METS Final    Rate Pressure Product 07/17/2024 20,412.0   Final    Stress Stage Reached 07/17/2024 3.0   Final    Protocol Name 07/17/2024 PAUL   Final    Exercise duration (min) 07/17/2024 7  min Final    Exercise duration (sec) 07/17/2024 0  sec Final    Post Peak Systolic BP 07/17/2024 126  mmHg Final    Max Diastolic Bp 07/17/2024 62  mmHg Final    Peak HR 07/17/2024 162  BPM Final    Max Predicted Heart Rate 07/17/2024 168  BPM Final    Reason for Termination 07/17/2024 Maximal exercise (symptom limited), SOB, FATIGUE   Final    Test Indication 07/17/2024 PALPITAIONS, WPW   Final    Target Hr Formular 07/17/2024 (220 - Age)*85%   Final    Arrhy During Ex 07/17/2024 none   Final    Chest " Pain Statement 07/17/2024 none   Final   Hospital Outpatient Visit on 07/17/2024   Component Date Value Ref Range Status    BSA 07/17/2024 1.71  m2 Final    A4C EF 07/17/2024 60  % Final    LVIDd 07/17/2024 4.10  cm Final    LVIDS 07/17/2024 2.70  cm Final    IVSd 07/17/2024 0.80  cm Final    LVPWd 07/17/2024 0.70  cm Final    FS 07/17/2024 34  28 - 44 Final    MV E' Tissue Velocity Septal 07/17/2024 12  cm/s Final    LA Volume Index (BP) 07/17/2024 11.1  mL/m2 Final    E/A ratio 07/17/2024 1.22   Final    E wave deceleration time 07/17/2024 199  ms Final    MV Peak E Tyler 07/17/2024 105  cm/s Final    MV Peak A Tyler 07/17/2024 0.86  m/s Final    RVID d 07/17/2024 3.1  cm Final    Tricuspid annular plane systolic e* 07/17/2024 2.10  cm Final    LA size 07/17/2024 2.8  cm Final    LA/Ao Ratio 2D 07/17/2024 1   Final    LA volume (BP) 07/17/2024 19  mL Final    MV stenosis pressure 1/2 time 07/17/2024 58  ms Final    MV valve area p 1/2 method 07/17/2024 3.79   Final    TR Peak Tyler 07/17/2024 2.1  m/s Final    Triscuspid Valve Regurgitation Pea* 07/17/2024 17.0  mmHg Final    Ao root 07/17/2024 2.80  cm Final    Asc Ao 07/17/2024 2.8  cm Final    Tricuspid valve peak regurgitation* 07/17/2024 2.09  m/s Final    Left ventricular stroke volume (2D) 07/17/2024 46.00  mL Final    IVS 07/17/2024 0.8  cm Final    LEFT VENTRICLE SYSTOLIC VOLUME (MO* 07/17/2024 27  mL Final    LV DIASTOLIC VOLUME (MOD BIPLANE) * 07/17/2024 74  mL Final    Left Atrium Area-systolic Four Ying* 07/17/2024 9.6  cm2 Final    Left Atrium Area-systolic Apical T* 07/17/2024 8.7  cm2 Final    LVSV, 2D 07/17/2024 46  mL Final    LV EF 07/17/2024 55   Final   Appointment on 05/15/2024   Component Date Value Ref Range Status    Sodium 05/15/2024 140  135 - 147 mmol/L Final    Potassium 05/15/2024 4.2  3.5 - 5.3 mmol/L Final    Chloride 05/15/2024 108  96 - 108 mmol/L Final    CO2 05/15/2024 26  21 - 32 mmol/L Final    ANION GAP 05/15/2024 6  4 - 13 mmol/L  Final    BUN 05/15/2024 16  5 - 25 mg/dL Final    Creatinine 05/15/2024 0.91  0.60 - 1.30 mg/dL Final    Standardized to IDMS reference method    Glucose, Fasting 05/15/2024 89  65 - 99 mg/dL Final    Calcium 05/15/2024 8.8  8.4 - 10.2 mg/dL Final    AST 05/15/2024 14  13 - 39 U/L Final    ALT 05/15/2024 14  7 - 52 U/L Final    Specimen collection should occur prior to Sulfasalazine administration due to the potential for falsely depressed results.     Alkaline Phosphatase 05/15/2024 25 (L)  34 - 104 U/L Final    Total Protein 05/15/2024 6.5  6.4 - 8.4 g/dL Final    Albumin 05/15/2024 4.0  3.5 - 5.0 g/dL Final    Total Bilirubin 05/15/2024 0.40  0.20 - 1.00 mg/dL Final    Use of this assay is not recommended for patients undergoing treatment with eltrombopag due to the potential for falsely elevated results.  N-acetyl-p-benzoquinone imine (metabolite of Acetaminophen) will generate erroneously low results in samples for patients that have taken an overdose of Acetaminophen.    eGFR 05/15/2024 72  ml/min/1.73sq m Final    Cholesterol 05/15/2024 186  See Comment mg/dL Final    Cholesterol:         Pediatric <18 Years        Desirable          <170 mg/dL      Borderline High    170-199 mg/dL      High               >=200 mg/dL        Adult >=18 Years            Desirable         <200 mg/dL      Borderline High   200-239 mg/dL      High              >239 mg/dL      Triglycerides 05/15/2024 89  See Comment mg/dL Final    Triglyceride:     0-9Y            <75mg/dL     10Y-17Y         <90 mg/dL       >=18Y     Normal          <150 mg/dL     Borderline High 150-199 mg/dL     High            200-499 mg/dL        Very High       >499 mg/dL    Specimen collection should occur prior to Metamizole administration due to the potential for falsely depressed results.    HDL, Direct 05/15/2024 84  >=50 mg/dL Final    LDL Calculated 05/15/2024 84  0 - 100 mg/dL Final    LDL Cholesterol:     Optimal           <100 mg/dl     Near  Optimal      100-129 mg/dl     Above Optimal       Borderline High 130-159 mg/dl       High            160-189 mg/dl       Very High       >189 mg/dl         This screening LDL is a calculated result.   It does not have the accuracy of the Direct Measured LDL in the monitoring of patients with hyperlipidemia and/or statin therapy.   Direct Measure LDL ([id number]) must be ordered separately in these patients.    Non-HDL-Chol (CHOL-HDL) 05/15/2024 102  mg/dl Final    Vit D, 25-Hydroxy 05/15/2024 74.5  30.0 - 100.0 ng/mL Final    Vitamin D guidelines established by Clinical Guidelines Subcommittee  of the Endocrine Society Task Force, 2011    Deficiency <20ng/ml   Insufficiency 20-30ng/ml   Sufficient  ng/ml     TSH 3RD GENERATON 05/15/2024 1.345  0.450 - 4.500 uIU/mL Final    The recommended reference ranges for TSH during pregnancy are as follows:   First trimester 0.100 to 2.500 uIU/mL   Second trimester  0.200 to 3.000 uIU/mL   Third trimester 0.300 to 3.000 uIU/m    Note: Normal ranges may not apply to patients who are transgender, non-binary, or whose legal sex, sex at birth, and gender identity differ.  Adult TSH (3rd generation) reference range follows the recommended guidelines of the [employer], January, 2020.    WBC 05/15/2024 4.15 (L)  4.31 - 10.16 Thousand/uL Final    RBC 05/15/2024 4.46  3.81 - 5.12 Million/uL Final    Hemoglobin 05/15/2024 13.2  11.5 - 15.4 g/dL Final    Hematocrit 05/15/2024 41.5  34.8 - 46.1 % Final    MCV 05/15/2024 93  82 - 98 fL Final    MCH 05/15/2024 29.6  26.8 - 34.3 pg Final    MCHC 05/15/2024 31.8  31.4 - 37.4 g/dL Final    RDW 05/15/2024 13.4  11.6 - 15.1 % Final    MPV 05/15/2024 11.1  8.9 - 12.7 fL Final    Platelets 05/15/2024 211  149 - 390 Thousands/uL Final    nRBC 05/15/2024 0  /100 WBCs Final    Segmented % 05/15/2024 63  43 - 75 % Final    Immature Grans % 05/15/2024 0  0 - 2 % Final    Lymphocytes % 05/15/2024 25  14 - 44 % Final    Monocytes %  05/15/2024 6  4 - 12 % Final    Eosinophils Relative 05/15/2024 5  0 - 6 % Final    Basophils Relative 05/15/2024 1  0 - 1 % Final    Absolute Neutrophils 05/15/2024 2.61  1.85 - 7.62 Thousands/µL Final    Absolute Immature Grans 05/15/2024 0.01  0.00 - 0.20 Thousand/uL Final    Absolute Lymphocytes 05/15/2024 1.05  0.60 - 4.47 Thousands/µL Final    Absolute Monocytes 05/15/2024 0.25  0.17 - 1.22 Thousand/µL Final    Eosinophils Absolute 05/15/2024 0.19  0.00 - 0.61 Thousand/µL Final    Basophils Absolute 05/15/2024 0.04  0.00 - 0.10 Thousands/µL Final   Office Visit on 04/29/2024   Component Date Value Ref Range Status    Alcohol Metabolites 04/29/2024 POSITIVE (A)  <500 ng/mL Final    Ethyl Glucuronide (ETG) 04/29/2024 3,880 (H)  <500 ng/mL Final    medMATCH ETG 04/29/2024 INCONSISTENT (A)   Final    Ethyl Sulfate (ETS) 04/29/2024 306 (H)  <100 ng/mL Final    medMATCH ETS 04/29/2024 INCONSISTENT (A)   Final    Alcohol Metab Comments 04/29/2024    Final    See Alcohol Metab Notes, LDT Notes    Amphetamine, Ur 04/29/2024 NEGATIVE CONFIRMED  <500 ng/mL Final    Amphetamine 04/29/2024 NEGATIVE  <250 ng/mL Final    Methamphetamine 04/29/2024 NEGATIVE  <250 ng/mL Final    AMPHETAMINE COMMENTS 04/29/2024    Final    See LDT Notes    Barbituate UR 04/29/2024 NEGATIVE  <300 ng/mL Final    Benzodiazepines 04/29/2024 POSITIVE (A)  <100 ng/mL Final    Alphahydroxyalprazolam 04/29/2024 113 (H)  <25 ng/mL Final    aOH alprazolam 04/29/2024 INCONSISTENT (A)   Final    Alphahydroxymidazolam 04/29/2024 NEGATIVE  <50 ng/mL Final    Alphahydroxytriazolam 04/29/2024 NEGATIVE  <50 ng/mL Final    Aminoclonazepam 04/29/2024 NEGATIVE  <25 ng/mL Final    Hydroxyethylflurazepam 04/29/2024 NEGATIVE  <50 ng/mL Final    Lorazepam 04/29/2024 NEGATIVE  <50 ng/mL Final    Nordiazepam 04/29/2024 NEGATIVE  <50 ng/mL Final    Oxazepam 04/29/2024 NEGATIVE  <50 ng/mL Final    Temazepam 04/29/2024 NEGATIVE  <50 ng/mL Final    Benzodiazepines Comments  04/29/2024    Final    See Benzodiazepines Notes, LDT Notes    Cocaine Metabolite 04/29/2024 NEGATIVE  <150 ng/mL Final    6 Acetylmorphine 04/29/2024 NEGATIVE  <10 ng/mL Final    Marijuana Metabolite 04/29/2024 NEGATIVE  <20 ng/mL Final    Methadone Metabolite 04/29/2024 NEGATIVE  <100 ng/mL Final    Opiates 04/29/2024 NEGATIVE  <100 ng/mL Final    Oxycodone 04/29/2024 NEGATIVE  <100 ng/mL Final    Phencyclidine 04/29/2024 NEGATIVE  <25 ng/mL Final    Creatinine, Urine 04/29/2024 242.6  > or = 20.0 mg/dL Final    pH, Urine 04/29/2024 5.0  4.5 - 9.0 Final    Oxidant 04/29/2024 NEGATIVE  <200 mcg/mL Final    Ritalinic Acid 04/29/2024 NEGATIVE  <100 ng/mL Final    Ritalinic Acid Comments 04/29/2024    Final    See LDT Notes    Fentanyl 04/29/2024 NEGATIVE  <0.5 ng/mL Final    SL AMB BUPRNORPHINE UR QL 04/29/2024 NEGATIVE  <5 ng/mL Final    Naltrexone 04/29/2024 NEGATIVE  <5 ng/mL Final    6 Beta Naltrexol 04/29/2024 NEGATIVE  <5 ng/mL Final    NALTREXONE COMMENTS 04/29/2024    Final    See LDT Notes    Gabapentin 04/29/2024 NEGATIVE  <1000 ng/mL Final    GABAPENTIN COMMENTS 04/29/2024    Final    See LDT Notes    Pregabalin 04/29/2024 NEGATIVE  <1000 ng/mL Final    PREGABALIN COMMENTS 04/29/2024    Final    See LDT Notes    MDPV 04/29/2024 NEGATIVE  <50 ng/mL Final    Mephedrone 04/29/2024 NEGATIVE  <50 ng/mL Final    Methylone 04/29/2024 NEGATIVE  <50 ng/mL Final    Butylone 04/29/2024 NEGATIVE  <50 ng/mL Final    SYN STIMULANTS COMMENTS 04/29/2024    Final    See LDT Notes    MedMATCH Summary 04/29/2024    Final    Comment:     Prescribed            Prescribed            Not Prescribed      Consistent            Inconsistent          Inconsistent      ----------            ------------          --------------                                                  Alphahydroxyalprazolam                                                  Ethyl Glucuronide (ETG                                                  Ethyl Sulfate  (ETS)         Notes and Comments 04/29/2024    Final    Comment: This drug testing is for medical treatment only.  Analysis was performed as non-forensic testing and  these results should be used only by healthcare  providers to render diagnosis or treatment, or to  monitor progress of medical conditions.     Alcohol Metab Notes:  Ethylglucuronide, Ethyl sulfate detected is consistent   with exposure to alcohol.     Benzodiazepines Notes:  aOH Alprazolam detected is consistent with the use of   the drug Alprazolam.     LDT Notes:  Confirmation tests were developed and their analytical   performance characteristics have been determined by   [facility]. It has not been cleared or approved   by the FDA. This assay has been validated pursuant to   the CLIA regulations and is used for clinical purposes.        medMATCH(R) enables providers to identify if drug use  is consistent or inconsistent with a corresponding  prescribed medication(s) list.        Healthcare Providers needing Interpretation assistance,   please contact us at [phone] ([phone])   M-F, 8am to 10pm EST          Results for orders placed during the hospital encounter of 10/23/17    MRI brain wo contrast    Narrative  MRI BRAIN WITHOUT CONTRAST    INDICATION:  R51: Headache  M54.2: Cervicalgia. History taken directly from the electronic ordering system.    COMPARISON:   None.    TECHNIQUE:  Sagittal T1, axial T2, axial FLAIR, axial T1, axial [place] and axial diffusion imaging.    IMAGE QUALITY:  Diagnostic.    FINDINGS:    BRAIN PARENCHYMA:  There are no areas of restricted diffusion. No midline shift, mass effect, or extra-axial collection is identified. No areas of gradient susceptibility to suggest blood product deposition.    No abnormal enhancement is identified.    No substantial white matter changes are seen.    Scattered punctate foci of increased T2 and FLAIR signal are noted in the supratentorial  white matter which is a nonspecific finding and may represent the subtle sequelae of a remote insult.    VENTRICLES:  The ventricles are normal in size and contour.    VASCULATURE:  Major arterial and dural venous flow voids are preserved by spin echo criteria.    ORBITS:  Normal.    PARANASAL SINUSES:  Normal.    EXTRACRANIAL SOFT TISSUES:  Normal.    SELLA AND PITUITARY GLAND:  Hypothalamic and pituitary region are grossly normal.    CALVARIUM AND SKULL BASE:  Craniocervical junction is within normal limits. Marrow signal is within normal limits without signs of an infiltrative process.    Impression  Unremarkable noncontrast MRI of the brain.    No acute intracranial abnormality.      Workstation performed: [id number]     No results found for this or any previous visit.    Results for orders placed during the hospital encounter of 08/20/24    MRI brain w wo contrast    Narrative  MRI BRAIN WITH AND WITHOUT CONTRAST    INDICATION: tremors.    COMPARISON: 10/23/2017    TECHNIQUE:  Multiplanar, multisequence imaging of the brain was performed before and after gadolinium administration.      IV Contrast:  6 mL of Gadobutrol injection (SINGLE-DOSE)    IMAGE QUALITY:   Diagnostic.    FINDINGS:    BRAIN PARENCHYMA:  There is no discrete mass, mass effect or midline shift. There is no intracranial hemorrhage.  Normal posterior fossa.  Diffusion imaging is unremarkable.    A few small peripheral white matter hyperintensities are identified measuring less than 3 mm in size within the periphery of the cerebral hemispheres possibly early chronic microangiopathic change.    Postcontrast imaging of the brain demonstrates no abnormal enhancement.    VENTRICLES:  Normal for the patient's age.    SELLA AND PITUITARY GLAND:  Normal.    ORBITS:  Normal.    PARANASAL SINUSES:  Normal.    VASCULATURE:  Evaluation of the major intracranial vasculature demonstrates appropriate flow voids.    CALVARIUM AND SKULL BASE:   Normal.    EXTRACRANIAL SOFT TISSUES:  Normal.    Impression  No acute intracranial abnormality.    A few small white matter hyperintensities on T2 and FLAIR imaging within the periphery of the cerebral hemispheres may represent mild early chronic microangiopathic change.    Workstation performed: [id number]    Results for orders placed during the hospital encounter of 10/23/17    MRI cervical spine wo contrast    Narrative  MRI CERVICAL SPINE WITHOUT CONTRAST    INDICATION:  R51: Headache  M54.2: Cervicalgia. History taken directly from the electronic ordering system.    COMPARISON:  None.    TECHNIQUE:  Sagittal T1, sagittal T2, sagittal inversion recovery, axial T2, axial  2D merge    IMAGE QUALITY:  Diagnostic    FINDINGS:    ALIGNMENT:  Slight reversal of the normal cervical lordosis    MARROW SIGNAL:  Marrow signal is within normal limits without signs of an infiltrative process. No signs of acute fracture or significant marrow edema pattern.    CERVICAL AND VISUALIZED THORACIC CORD:  Normal signal within the visualized cord.    PREVERTEBRAL AND PARASPINAL SOFT TISSUES:   Changes of a right-sided thyroidectomy.  5 mm nodule in the residual left thyroid lobe.  No pathologic adenopathy.    VISUALIZED POSTERIOR FOSSA:  The visualized posterior fossa demonstrates no abnormal signal.    CERVICAL DISC SPACES:    C2-C3:  No significant spinal canal stenosis. No right and no left neural foraminal stenosis.    C3-C4:  No significant spinal canal stenosis.  No right and no left neural foraminal stenosis.    C4-C5:  Disc osteophyte complex with uncovertebral hypertrophy. No significant spinal canal stenosis. No right and no left neural foraminal stenosis.    C5-C6:  Right subarticular to foraminal disc protrusion.  No significant spinal canal stenosis.  Mild right and no significant left neural foraminal stenosis.  Progressed from prior study.    C6-C7:  Right foraminal protrusion.  Flattening of the right lateral  cord without compression.  Mild right and no significant left neural foraminal stenosis.  Progressed from prior study.    C7-T1:  No significant spinal canal stenosis. No right and no left neural foraminal stenosis.    UPPER THORACIC DISC SPACES:  Normal.    Impression  Right foraminal protrusions at C5-C6 and C6-C7 resulting in mild right foraminal stenoses.    Flattening of the right lateral cord at C6-C7.    No cord signal abnormality.      Workstation performed: [id number]    No results found for this or any previous visit.    No results found for this or any previous visit.    No results found for this or any previous visit.    No results found for this or any previous visit.    No results found for this or any previous visit.    Results for orders placed during the hospital encounter of 08/20/24    MRI cervical spine w wo contrast    Narrative  MRI CERVICAL SPINE WITH AND WITHOUT CONTRAST    INDICATION: tremors.    COMPARISON: 10/23/2017    TECHNIQUE:  Multiplanar, multisequence imaging of the cervical spine was performed before and after gadolinium administration. .      IV Contrast:  6 mL of Gadobutrol injection (SINGLE-DOSE)    IMAGE QUALITY:  Diagnostic.    FINDINGS:    ALIGNMENT:  Normal alignment of the cervical spine.  No compression fracture.  No subluxation.  No scoliosis.    MARROW SIGNAL:  Normal marrow signal is identified within the visualized bony structures.  No discrete marrow lesion.    CERVICAL AND VISUALIZED UPPER THORACIC CORD:  Normal signal within the visualized cord.    PREVERTEBRAL AND PARASPINAL SOFT TISSUES:  Normal.    VISUALIZED POSTERIOR FOSSA:  The visualized posterior fossa demonstrates no abnormal signal.    CERVICAL DISC SPACES:    C2-C3:  Normal.    C3-C4: Loss of disc height. Annular bulging with uncinate joint hypertrophic degenerative change, right slightly greater than left. Mild central canal stenosis without cord compression. Moderate right greater than left foraminal  narrowing. These changes  have progressed when compared with the prior study.    C4-C5: Slight loss of disc height. Minimal annular bulging and mild uncinate joint hypertrophic degenerative change. There is mild central canal stenosis. Mild right foraminal narrowing without nerve compression.    C5-C6:  Normal.    C6-C7: Disc desiccation and loss of disc height. Annular bulging with endplate and uncinate joint hypertrophic degenerative change. There is a small slight right paramedian endplate osteophyte. Mild central canal stenosis with effacement of the ventral  CSF space but no discrete cord compression. Mild bilateral foraminal narrowing.    C7-T1:  Normal.    UPPER THORACIC DISC SPACES:  Normal.    POSTCONTRAST IMAGING:  Normal.    OTHER FINDINGS:  None.    Impression  Cervical degenerative disc disease most prominent at the C3-4 and C6-7 levels. At C6-7 there is stable right paramedian disc/osteophyte hypertrophic change and uncinate joint hypertrophic changes resulting in mild canal stenosis and foraminal narrowing.  There is foraminal narrowing at C3-4 which is new compared to the prior examination.          Workstation performed: [id number]    Results for orders placed during the hospital encounter of 08/20/24    MRI thoracic spine w wo contrast    Narrative  MRI THORACIC SPINE WITH AND WITHOUT CONTRAST    INDICATION: tremors.    COMPARISON: 9/10/2015    TECHNIQUE:  Multiplanar, multisequence imaging of the thoracic spine was performed before and after gadolinium administration. .    IV Contrast:  6 mL of Gadobutrol injection (SINGLE-DOSE)    IMAGE QUALITY:  Diagnostic.    FINDINGS:    ALIGNMENT:  Normal alignment of the thoracic spine.  No compression fracture.  No subluxation.  No evidence of scoliosis.    MARROW SIGNAL:  Normal marrow signal is identified within the visualized bony structures.  No discrete marrow lesion.    THORACIC CORD: There is stable mild prominence of the central canal extending  from the T4-T7 level.    PREVERTEBRAL AND PARASPINAL SOFT TISSUES:   Normal.    THORACIC DEGENERATIVE CHANGE:  No disc herniation, canal stenosis or foraminal narrowing. No degenerative changes.    POSTCONTRAST:  No abnormal enhancement.    OTHER FINDINGS:  None.    Impression  No interval change. No significant canal stenosis or foraminal narrowing.    Stable mild prominence of the central canal in the mid thoracic cord. No abnormal enhancement is identified within the thoracic spinal canal.    Workstation performed: [id number]      Results for orders placed in visit on 12/08/14    MRI thoracic spine w wo contrast    Narrative  MRI THORACIC SPINE WITH AND WITHOUT CONTRAST  INDICATION-  Low back pain, bilateral lower and upper extremity  numbness, query T4-T7 syrinx  COMPARISON-  MR 11/24/2014  TECHNIQUE-  Sagittal T1, sagittal T2, sagittal inversion recovery,  axial T2 axial 2D merge.  Sagittal and axial T1 postcontrast.  7 mL of Gadavist was injected intravenously without immediate  consequence.  IMAGE QUALITY-  Diagnostic.  FINDINGS-  ALIGNMENT-  Normal alignment of the thoracic spine.  No compression  fracture.  No subluxation.  No evidence of scoliosis.  MARROW SIGNAL-  Normal marrow signal is identified within the  visualized bony structures.  No discrete marrow lesion.  THORACIC CORD-  There is prominence of the central canal extending from  the mid T4 to the caudal T7 level.  The no enhancement.  No cerebellar  tonsillar ectopia and no tethered cord.  Cord terminates at the L1  level.  This finding is likely incidental but could be followed in 6  months with repeat MR of the thoracic spine.  At its maximum, diameter  of this focus at the T4-T5 disc space level, there is less than 2 mm.  PREVERTEBRAL AND PARASPINAL SOFT TISSUES-  Prevertebral and paraspinal  soft tissues are unremarkable.  THORACIC DEGENERATIVE CHANGE-  No disc herniation, canal stenosis or  foraminal narrowing. No degenerative  "changes.  POSTCONTRAST-  No abnormal enhancement.  IMPRESSION-  Minor prominence of the central canal from T4 to the T7 level, of  unlikely clinical significance.  This could be followed with repeat  contrasted MR in 6 months time.  Transcribed on- [id number]    - CONSTANCE GONGORA MD  Reading Radiologist- CONSTANCE GONGORA MD  Electronically Signed- CONSTANCE GONGORA MD  Released Date Time- 12/08/14 1549    ------------------------------------------------------------------------------  DESHAWN MOORE    No results found for this or any previous visit.      Review of Systems   Constitutional:  Negative for appetite change, fatigue and fever.   HENT: Negative.  Negative for hearing loss, tinnitus, trouble swallowing and voice change.    Eyes: Negative.  Negative for photophobia, pain and visual disturbance.   Respiratory: Negative.  Negative for shortness of breath.    Cardiovascular: Negative.  Negative for palpitations.   Gastrointestinal: Negative.  Negative for nausea and vomiting.   Endocrine: Negative.  Negative for cold intolerance.   Genitourinary: Negative.  Negative for dysuria, frequency and urgency.   Musculoskeletal:  Negative for back pain, gait problem, myalgias, neck pain and neck stiffness.   Skin: Negative.  Negative for rash.   Allergic/Immunologic: Negative.    Neurological:  Positive for tremors, weakness and numbness. Negative for dizziness, seizures, syncope, facial asymmetry, speech difficulty, light-headedness and headaches.   Hematological: Negative.  Does not bruise/bleed easily.   Psychiatric/Behavioral: Negative.  Negative for confusion, hallucinations and sleep disturbance.          On examination,     Blood pressure 120/70, pulse 73, height 5' 7\" (1.702 m), weight 61.2 kg (135 lb), last menstrual period 08/06/2024.    Well developed, well nourished, in no acute distress    Normocephalic, atraumatic    Heart: regular rate and rhythm  I did " not hear a carotid bruit    Extremities: no clubbing, cyanosis, or edema    Speech and cognition appeared normal    Cranial nerves:  II: Pupils equal, round, and reactive to light. No gross visual field defect. I did not appreciate optic disc edema.  III, IV, VI: Extraocular movements intact  V: Normal facial sensation in all three divisions of the trigeminal nerve bilaterally  VII: Normal facial strength  VIII: Hearing intact to finger rub bilaterally  IX, X: Palate elevated symmetrically  XI: Sternocleidomastoid strength normal bilaterally  XII: Tongue protruded in midline without atrophy or fibrillations    Motor:  Normal tone and bulk throughout.   Muscle strength testing by the MRC scale (listed below) was 5/5 in the deltoid, biceps, triceps, wrist extensors, wrist flexors, finger extensors, finger flexors,. Hip flexors, quadriceps, ankle dorsiflexors, ankle plantar flexors, and EHL bilaterally  MRC scale:  0/5 No contraction is present   1/5 Incomplete range of active motion, even when gravity is eliminated   2/5 Complete active range of motion with gravity eliminated   3/5 Full motion against gravity, but not with any resistance  4/5 The patient is able to complete the action against gravity and some resistance by the examiner   5/5 Completely normal strength, overcoming gravity and all resistance by the examiner     Deep tendon reflexes:   2+ and symmetrical in the biceps, triceps, brachioradialis, patellas, and ankles  Toes downgoing (no Babinski sign)  No Fox's sign    Sensation: Normal pinprick and light touch throughout  Normal Romberg  No truncal sensory level    Cerebellar: normal finger to nose and heel to shin testing    Gait: Normal heel, toe, and tandem gait            There are no Patient Instructions on file for this visit.      Thank you very much for allowing me to participate in your patient's care. Please feel free to contact me for any questions or concerns. Please be aware of the  inherent limitations of voice recognition software, which may result in transcriptional errors.    Ronny Castaneda MD

## 2024-09-17 ENCOUNTER — OFFICE VISIT (OUTPATIENT)
Dept: CARDIOLOGY CLINIC | Facility: CLINIC | Age: 54
End: 2024-09-17
Payer: COMMERCIAL

## 2024-09-17 VITALS
DIASTOLIC BLOOD PRESSURE: 78 MMHG | WEIGHT: 135 LBS | SYSTOLIC BLOOD PRESSURE: 118 MMHG | HEIGHT: 67 IN | HEART RATE: 98 BPM | OXYGEN SATURATION: 95 % | BODY MASS INDEX: 21.19 KG/M2 | RESPIRATION RATE: 16 BRPM

## 2024-09-17 DIAGNOSIS — R06.09 DYSPNEA ON EXERTION: ICD-10-CM

## 2024-09-17 DIAGNOSIS — Z82.49 FAMILY HISTORY OF PREMATURE CAD: ICD-10-CM

## 2024-09-17 DIAGNOSIS — R00.2 PALPITATIONS: ICD-10-CM

## 2024-09-17 DIAGNOSIS — R00.0 TACHYCARDIA: ICD-10-CM

## 2024-09-17 DIAGNOSIS — R25.2 CRAMPS OF LOWER EXTREMITY: ICD-10-CM

## 2024-09-17 DIAGNOSIS — I45.6 WPW (WOLFF-PARKINSON-WHITE SYNDROME): Primary | ICD-10-CM

## 2024-09-17 DIAGNOSIS — M25.471 EDEMA OF RIGHT ANKLE: ICD-10-CM

## 2024-09-17 DIAGNOSIS — R07.9 CHEST PAIN IN ADULT: ICD-10-CM

## 2024-09-17 PROCEDURE — 99214 OFFICE O/P EST MOD 30 MIN: CPT

## 2024-09-17 PROCEDURE — 93000 ELECTROCARDIOGRAM COMPLETE: CPT

## 2024-09-17 NOTE — PROGRESS NOTES
CARDIOLOGY OFFICE VISIT  [facility]  [address] [place] [address], [place] [address]  Tel: [phone]      NAME: Emely Thibodeaux  AGE: 53 y.o.  SEX: female  : 1970  MRN: [id number]      Chief Complaint:  Chief Complaint   Patient presents with    Follow-up       Assessment and Plan:    1.  WPW  Recommend avoiding AV monica blocking agents  She was advised to utilize Kardia mobile or EKG settings on her Apple Watch to monitor underlying rhythm during symptomatic episodes, and to reach out to our office if these devices inform her of dysrhythmias.  She was advised to inform our office if she develops worsening palpitations, lightheaded or dizzy episodes, near-syncope, or to proceed to the ER if she develops syncopal episodes.  She is requesting referral to establish with electrophysiology.  Referral placed.    2.  Exertional chest pain, dyspnea on exertion  3.  Family history of premature CAD  Exercise stress test without evidence of ischemia  CT calcium score ordered for further evaluation and risk stratification.  She denies current pregnancy, and was advised to complete urine pregnancy test prior to CT scan.  Advised to follow-up with PCP to investigate possible noncardiac etiologies of her symptoms.    4.  Palpitations  No significant dysrhythmias on recent event monitor.  She did have one 5 beat run of PACs associated with palpitations, however no other significant dysrhythmias noted and would not recommend initiation of AV monica blocking agents as noted above.  Recommend continuing to monitor her symptoms with Kardia mobile or Apple Watch EKG settings as noted above.  She was advised to notify our office of changing or worsening symptoms, or if she receives dysrhythmia notifications.    5.  Intermittent right ankle edema  Likely dependent edema versus related to right sided weakness/tremors.  Advised keeping lower extremities elevated,  compression stockings, low-sodium diet.      6.  Lower extremity cramping  BMP & magnesium level ordered  Advised to follow-up with PCP for further management    Continue to follow with neurology and PCP regarding right sided weakness and tremors.    Follow-up: 3 months or sooner as needed.   All questions and concerned addressed.   Patient was advised to notify our office with onset of new or worsening cardiac symptoms.    1. WPW (Pedrito-Parkinson-White syndrome)  Ambulatory referral to Cardiac Electrophysiology      2. Tachycardia  POCT ECG      3. Chest pain in adult  CT coronary calcium score    Pregnancy Test, Urine      4. Dyspnea on exertion  CT coronary calcium score    Pregnancy Test, Urine      5. Palpitations        6. Edema of right ankle        7. Family history of premature CAD        8. Cramps of lower extremity  Basic metabolic panel    Magnesium          History of Present Illness:     Emely Thibodeaux is a 53 y.o. female with PMHx of WPW, anxiety, GERD, thyroid disorder, family history of premature CAD who presents for follow-up.  This patient previously followed with Dr. De La Cruz.    She reports since mid August, she has been experiencing right sided weakness and tremors, for which she was hospitalized and is now following with her PCP and neurology.  She reports that since August, she has additionally been experiencing dyspnea on exertion and occasional episodes of chest discomfort.  She states she experiences dyspnea on exertion when going up 1 flight of steps quickly, and has noted that she has been exercising less since that time as well.  She has been experiencing chest discomfort as a dull ache that comes on with activity about 1-2 times per day.  She reports the chest discomfort improves after sitting and resting for a few minutes.  She reports it is at a 4/10 at its most severe.  She states she is continue to experience palpitations as a sensation of sudden onset and sudden stopping of heart  racing.  She states this is been ongoing for several years with no recent changing or worsening.  She states she did have the symptoms while wearing her heart monitor.  She additionally reports she has been having intermittent right ankle swelling for the past 2 days.  She denies history of blood clots, recent travel/remaining sedentary for prolonged periods of time recently, denies recent surgeries, denies pain in the right ankle.  She denies swelling to the left lower extremity.  She reports this swelling is worse at the end of the day, and improves overnight after elevating the lower extremities.  She denies sudden unexpected weight gain.  She additionally endorses bilateral lower extremity cramping over the past couple of weeks.  She reports the cramping happens typically in the evening when she is resting, and denies exertional lower extremity cramping or discomfort.    She denies lightheadedness, dizziness, near-syncope, syncopal episodes.    Family History: Denies family history of sudden arrhythmic death or sudden cardiac death. Father with coronary stent in his 30s-40s.  She denies known family history of WPW.  Social History: History of smoking, smoked about 1 pack per week on and off for about 6-7 years, quit 15 years ago.     Recent Cardiac Work-Up:  Event monitor 7/2024: 1 brief run of 5 PACs associated with sensation of skipped beat.  Average heart rate 83 bpm, highest heart rate was 130 bpm and sinus tachycardia.  She did have mild sinus tachycardia associated with shortness of breath.  No other significant dysrhythmias recorded.  Exercise stress test 7/17/2024: Negative for ischemia  Echocardiogram 7/17/2024: EF 55%, mild TR      Review of Systems:   Review of Systems   Constitutional:  Negative for chills, diaphoresis, fever and unexpected weight change.   HENT:  Negative for ear pain and sore throat.    Eyes:  Negative for pain and redness.   Respiratory:  Positive for shortness of breath. Negative  "for cough.    Cardiovascular:  Positive for chest pain, palpitations and leg swelling.   Gastrointestinal:  Negative for abdominal pain and vomiting.   Genitourinary:  Negative for dysuria.   Neurological:  Positive for tremors and weakness. Negative for dizziness, syncope and light-headedness.   Hematological:  Does not bruise/bleed easily.   Psychiatric/Behavioral:  Negative for agitation and behavioral problems.          Vitals:  Vitals:    09/17/24 1308   BP: 118/78   BP Location: Left arm   Patient Position: Sitting   Cuff Size: Standard   Pulse: 98   Resp: 16   SpO2: 95%   Weight: 61.2 kg (135 lb)   Height: 5' 7\" (1.702 m)        Body mass index is 21.14 kg/m².    Weight (last 2 days)       Date/Time Weight    09/17/24 1308 61.2 (135)              Physical Exam:   GEN: Alert and oriented x 3, in no acute distress.  Well appearing and well nourished.   HEENT: Sclera anicteric, conjunctivae pink, mucous membranes moist. Oropharynx clear.   NECK: Supple, no carotid bruits, no significant JVD. Trachea midline.   HEART: Tachycardic, regular rhythm, normal S1 and S2, no murmurs, clicks, gallops or rubs. PMI nondisplaced, no thrills.   LUNGS: Clear to auscultation bilaterally; no wheezes, rales, or rhonchi. No increased work of breathing or signs of respiratory distress.   ABDOMEN: Soft, nontender, nondistended.   EXTREMITIES: Skin warm and well perfused, no clubbing, cyanosis, or edema.  NEURO:  Normal speech. Mood and affect normal.       EKG Reviewed Personally: 9/17/2024: Sinus rhythm (IN interval 138 ms), possible left atrial enlargement, incomplete RBBB    The 10-year ASCVD risk score (Irma DK, et al., 2019) is: 0.6%    Values used to calculate the score:      Age: 53 years      Sex: Female      Is Non- : No      Diabetic: No      Tobacco smoker: No      Systolic Blood Pressure: 118 mmHg      Is BP treated: No      HDL Cholesterol: 94 mg/dL      Total Cholesterol: 175 mg/dL    Active " Problems:  Patient Active Problem List   Diagnosis    Anxiety    WPW (Pedrito-Parkinson-White syndrome)    Insomnia    Osteoarthritis of carpometacarpal (CMC) joint of thumb    Thyroid disorder    Thyroid nodule    Vitamin D deficiency    Benzodiazepine use agreement exists    Annual physical exam    Gastroesophageal reflux disease without esophagitis    Right sided weakness    Weakness of right lower extremity    Muscle weakness of right arm       Past Medical History:  Past Medical History:   Diagnosis Date    Anxiety     Arthritis     Chronic pain     Neck. Numbness in fingers    Colon polyp     CTS (carpal tunnel syndrome)     Right    DDD (degenerative disc disease), cervical     DDD (degenerative disc disease), lumbar     Disease of thyroid gland     History of COVID-19     History of echocardiogram 11/26/2012    EF 55%, NWMA, Normal    Insomnia     Orbital floor (blow-out) closed fracture (HCC) 01/20/2011    Palpitations     Pneumonia 2008    WPW (Pedrito-Parkinson-White syndrome)          Past Surgical History:  Past Surgical History:   Procedure Laterality Date    AUGMENTATION MAMMAPLASTY Bilateral 2010    AUGMENTATION MAMMAPLASTY Bilateral 04/01/2022    redone    BREAST SURGERY Bilateral     Augmentation    EPIDURAL BLOCK INJECTION N/A 05/03/2018    Procedure: CERVICAL EPIDURAL STEROID INJECTION;  Surgeon: Kye Webster MD;  Location: [facility];  Service: Pain Management     MI DSTR NROLYTC AGNT PARVERTEB FCT SNGL LMBR/SACRAL Right 12/15/2016    Procedure: LUMBAR RADIOFREQUENCY LESIONING ;  Surgeon: Kye Webster MD;  Location: [facility];  Service: Pain Management     MI NJX DX/THER AGT PVRT FACET JT CRV/THRC 1 LEVEL Right 06/07/2018    Procedure: C4-5 C5-6 C7-T1 CERVICAL MEDIAL BRANCH BLOCK;  Surgeon: Kye Webster MD;  Location: [facility];  Service: Pain Management     ROTATOR CUFF REPAIR Right     THYROIDECTOMY, PARTIAL      TRANSUMBILICAL AUGMENTATION MAMMAPLASTY Bilateral          Family  History:  Family History   Problem Relation Age of Onset    Endometrial cancer Mother 70    Arthritis Father     No Known Problems Sister     No Known Problems Daughter     No Known Problems Daughter     Lung cancer Maternal Grandmother     No Known Problems Paternal Grandmother     Lung cancer Maternal Aunt     Breast cancer Maternal Aunt 50    Breast cancer Maternal Aunt 58    No Known Problems Maternal Aunt     Cancer Paternal Aunt     Colon cancer Paternal Aunt 58    Breast cancer Paternal Aunt 45    Cancer Paternal Aunt     No Known Problems Paternal Aunt     Thyroid cancer Cousin          Social History:  Social History     Socioeconomic History    Marital status: /Civil Union     Spouse name: None    Number of children: None    Years of education: None    Highest education level: None   Occupational History    None   Tobacco Use    Smoking status: Former     Current packs/day: 0.00     Average packs/day: 0.3 packs/day for 10.0 years (2.5 ttl pk-yrs)     Types: Cigarettes     Start date: 2008     Quit date: 2018     Years since quittin.3    Smokeless tobacco: Never   Vaping Use    Vaping status: Never Used   Substance and Sexual Activity    Alcohol use: Not Currently     Alcohol/week: 1.0 standard drink of alcohol     Types: 1 Cans of beer per week     Comment: few times week    Drug use: No    Sexual activity: Yes     Birth control/protection: Male Sterilization   Other Topics Concern    None   Social History Narrative    Caffeine use      Social Determinants of Health     Financial Resource Strain: Not on file   Food Insecurity: No Food Insecurity (2024)    Hunger Vital Sign     Worried About Running Out of Food in the Last Year: Never true     Ran Out of Food in the Last Year: Never true   Transportation Needs: No Transportation Needs (2024)    PRAPARE - Transportation     Lack of Transportation (Medical): No     Lack of Transportation (Non-Medical): No   Physical Activity: Not on  "file   Stress: Not on file   Social Connections: Not on file   Intimate Partner Violence: Not on file   Housing Stability: Low Risk  (8/20/2024)    Housing Stability Vital Sign     Unable to Pay for Housing in the Last Year: No     Number of Times Moved in the Last Year: 0     Homeless in the Last Year: No           The following portions of the patient's history were reviewed and updated as appropriate: past medical history, past surgical history, past family history,  past social history, current medications, allergies and problem list.        Laboratory Results:  CBC with diff:   Lab Results   Component Value Date    WBC 7.79 08/20/2024    RBC 4.42 08/20/2024    HGB 13.5 08/20/2024    HCT 40.6 08/20/2024    MCV 92 08/20/2024    MCH 30.5 08/20/2024    RDW 13.6 08/20/2024     08/20/2024       CMP:  Lab Results   Component Value Date    CREATININE 0.76 08/20/2024    CREATININE 0.66 03/31/2020    BUN 10 08/20/2024    BUN 13 03/31/2020    K 3.8 08/20/2024    K 3.8 03/31/2020     08/20/2024     03/31/2020    CO2 26 08/20/2024    CO2 22 (L) 03/31/2020    ALKPHOS 28 (L) 08/20/2024    ALKPHOS 54 03/31/2020    ALT 16 08/20/2024    ALT 26 03/31/2020    AST 13 08/20/2024    AST 30 03/31/2020       Lab Results   Component Value Date    HGBA1C 5.3 08/21/2024       Lab Results   Component Value Date    TROPONINI <0.02 03/31/2020    TROPONINI <0.02 03/31/2020    CKTOTAL 33 08/20/2024       Lipid Profile:   No results found for: \"CHOL\"  Lab Results   Component Value Date    HDL 94 08/21/2024    HDL 84 05/15/2024    HDL 89 10/13/2022     Lab Results   Component Value Date    LDLCALC 62 08/21/2024    LDLCALC 84 05/15/2024    LDLCALC 56 10/13/2022     Lab Results   Component Value Date    TRIG 93 08/21/2024    TRIG 89 05/15/2024    TRIG 109 10/13/2022       Cardiac testing:   Results for orders placed during the hospital encounter of 07/17/24    Echo complete w/ contrast if indicated    Interpretation Summary    " Left Ventricle: Left ventricular cavity size is normal. Wall thickness is normal. The left ventricular ejection fraction is 55%. Systolic function is normal. Wall motion is normal. Diastolic function is normal.    Right Ventricle: Right ventricular cavity size is normal. Systolic function is normal.    Tricuspid Valve: There is mild regurgitation.    No results found for this or any previous visit.    No results found for this or any previous visit.    No results found for this or any previous visit.    No results found for this or any previous visit.    No results found for this or any previous visit.    Results for orders placed during the hospital encounter of 07/17/24    Stress test only, exercise    Interpretation Summary    Stress ECG: No ST deviation is noted. The ECG was negative for ischemia.    No results found for this or any previous visit.        Medications:    Current Outpatient Medications:     albuterol (PROVENTIL HFA,VENTOLIN HFA) 90 mcg/act inhaler, Inhale 2 puffs every 6 (six) hours as needed for wheezing, Disp: 18 g, Rfl: 2    ALPRAZolam (XANAX) 0.25 mg tablet, take 1 tablet by mouth twice a day if needed for anxiety, Disp: 60 tablet, Rfl: 0    etonogestrel-ethinyl estradiol (NuvaRing) 0.12-0.015 MG/24HR vaginal ring, Insert vaginally and leave in place for 3 consecutive weeks, then remove for 1 week., Disp: 3 each, Rfl: 5    traZODone (DESYREL) 100 mg tablet, take 3 tablets by mouth daily at bedtime, Disp: 270 tablet, Rfl: 1      Allergies:  Allergies   Allergen Reactions    Sulfa Antibiotics Hives           Recommend aggressive risk factor modification and therapeutic lifestyle changes.  Low-salt, low-calorie, low-fat, low-cholesterol diet with regular exercise and to optimize weight.    Discussed concepts of cardiovascular disease, including signs and symptoms of heart disease.    Previous studies were reviewed.    Safety measures were reviewed.  Questions were entertained and  answered.  Patient was advised to report any problems requiring medical attention.    Follow-up with PCP and appropriate specialist and lab work/testing as discussed.    Return for follow up visit as scheduled or earlier, if needed.  Thank you for allowing me to participate in the care and evaluation of your patient.  Should you have any questions, please feel free to contact me.    Leanna Davies PA-C  9/17/2024,2:19 PM      ** Please Note: Fluency DirectDictation voice to text software may have been used in the creation of this document. **

## 2024-09-18 ENCOUNTER — HOSPITAL ENCOUNTER (OUTPATIENT)
Dept: MRI IMAGING | Facility: HOSPITAL | Age: 54
Discharge: HOME | End: 2024-09-18
Payer: COMMERCIAL

## 2024-09-18 DIAGNOSIS — R29.898 WEAKNESS OF RIGHT LOWER EXTREMITY: ICD-10-CM

## 2024-09-18 DIAGNOSIS — M62.81 MUSCLE WEAKNESS OF RIGHT ARM: ICD-10-CM

## 2024-09-18 DIAGNOSIS — R53.1 RIGHT SIDED WEAKNESS: ICD-10-CM

## 2024-09-18 PROCEDURE — 70551 MRI BRAIN STEM W/O DYE: CPT

## 2024-09-30 ENCOUNTER — TELEPHONE (OUTPATIENT)
Dept: FAMILY MEDICINE CLINIC | Facility: CLINIC | Age: 54
End: 2024-09-30

## 2024-09-30 DIAGNOSIS — F41.9 ANXIETY: ICD-10-CM

## 2024-09-30 LAB
Lab: NORMAL
MISCELLANEOUS LAB TEST RESULT: NORMAL

## 2024-09-30 NOTE — TELEPHONE ENCOUNTER
Patient notified, she is asking what now? She is unable to use the right hand, no muscle in the right arm, weakness in the right leg. There is something wrong and she would like to find out what it is. Should she see a specialist?

## 2024-09-30 NOTE — TELEPHONE ENCOUNTER
ROHIT Suazo  9/30/2024 12:19 PM EDT Back to Top      The myasthenia test was negative    ROHIT Suazo  9/9/2024  4:39 PM EDT       Lyme is negative

## 2024-10-01 RX ORDER — ALPRAZOLAM 0.25 MG
TABLET ORAL
Qty: 60 TABLET | Refills: 0 | Status: SHIPPED | OUTPATIENT
Start: 2024-10-01

## 2024-10-04 ENCOUNTER — OFFICE VISIT (OUTPATIENT)
Dept: URGENT CARE | Facility: CLINIC | Age: 54
End: 2024-10-04
Payer: COMMERCIAL

## 2024-10-04 VITALS
OXYGEN SATURATION: 98 % | RESPIRATION RATE: 18 BRPM | HEART RATE: 74 BPM | DIASTOLIC BLOOD PRESSURE: 76 MMHG | SYSTOLIC BLOOD PRESSURE: 132 MMHG | TEMPERATURE: 98.3 F

## 2024-10-04 DIAGNOSIS — J02.9 SORE THROAT: ICD-10-CM

## 2024-10-04 DIAGNOSIS — R05.1 ACUTE COUGH: ICD-10-CM

## 2024-10-04 DIAGNOSIS — J06.9 ACUTE URI: Primary | ICD-10-CM

## 2024-10-04 LAB
S PYO AG THROAT QL: NEGATIVE
SARS-COV-2 AG UPPER RESP QL IA: NEGATIVE
VALID CONTROL: NORMAL

## 2024-10-04 PROCEDURE — 99213 OFFICE O/P EST LOW 20 MIN: CPT | Performed by: PHYSICAL MEDICINE & REHABILITATION

## 2024-10-04 PROCEDURE — 87880 STREP A ASSAY W/OPTIC: CPT | Performed by: PHYSICAL MEDICINE & REHABILITATION

## 2024-10-04 PROCEDURE — 87811 SARS-COV-2 COVID19 W/OPTIC: CPT | Performed by: PHYSICAL MEDICINE & REHABILITATION

## 2024-10-04 PROCEDURE — 87070 CULTURE OTHR SPECIMN AEROBIC: CPT | Performed by: PHYSICAL MEDICINE & REHABILITATION

## 2024-10-04 RX ORDER — PREDNISONE 20 MG/1
40 TABLET ORAL DAILY
Qty: 10 TABLET | Refills: 0 | Status: SHIPPED | OUTPATIENT
Start: 2024-10-04 | End: 2024-10-09

## 2024-10-04 NOTE — PROGRESS NOTES
[facility] Now        NAME: Emely Thibodeaux is a 54 y.o. female  : 1970    MRN: [id number]  DATE: 2024  TIME: 12:08 PM    Assessment and Plan   Acute URI [J06.9]  1. Acute URI        2. Sore throat  POCT rapid ANTIGEN strepA    Throat culture    predniSONE 20 mg tablet      3. Acute cough  Poct Covid 19 Rapid Antigen Test    predniSONE 20 mg tablet        Rapid strep negative  Will send throat culture at patient's request  Likely a viral URI based on presentation   Recommend conservative measurements    Patient Instructions     Vitamin D3 2000 IU daily.  Vitamin C 1000mg twice per day.  Multivitamin daily.  Some studies suggest that Zinc 12.5-15mg every 2 hours while awake x 5 days may shorten the duration cold symptoms by 1-2 days.   Fluids and rest.  Nasal saline spray; Afrin if severe congestion (do not use for more than 3 days)  Over the counter cough/cold medications as needed.   Flonase nasal spray.  Tylenol/Ibuprofen for pain/fever.  Salt water gargles and/or chloraseptic spray.  Warm tea with honey.  Warm compresses over sinuses.  Nasal rinses with distilled water.    Common symptoms and over the counter treatments:  For congestion: antihistamines for decongestants or allergy relief include: Benadryl, brompheniramine (Dimetapp),  doxylamine  Decongestant: Pseudoephedrine   Fever control: Acetaminophen or Ibuprofen   Cough suppressant- when your cough is dry : Dextromethorphan (Delysm, Robitussin)  Cough expectorant- when your cough is productive/wet: guaifenesin  (Mucinex)    Follow up with PCP in 3-5 days.  Proceed to  ER if symptoms worsen.    If tests are performed, our office will contact you with results only if changes need to made to the care plan discussed with you at the visit. [facility].    Chief Complaint     Chief Complaint   Patient presents with    Sore Throat    Cough     Verbalizes sore throat, and cough , started 2 days  ago. 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 Taking 800 of motrin, LD today at 6:30am         History of Present Illness       Patient presenting with sore throat and cough that started on 10/2/24. 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 She has taken Motrin. Patient took an at home covid last night that was negative. She would like a re-test as she is unsure how accurate the at home version is.    Sore Throat   Associated symptoms include coughing.   Cough  Associated symptoms include a sore throat.       Review of Systems   Review of Systems   Constitutional: Negative.    HENT:  Positive for sore throat.    Respiratory:  Positive for cough.    Cardiovascular: Negative.    Gastrointestinal: Negative.    Musculoskeletal: Negative.          Current Medications       Current Outpatient Medications:     albuterol (PROVENTIL HFA,VENTOLIN HFA) 90 mcg/act inhaler, Inhale 2 puffs every 6 (six) hours as needed for wheezing, Disp: 18 g, Rfl: 2    ALPRAZolam (XANAX) 0.25 mg tablet, take 1 tablet by mouth twice a day if needed for anxiety, Disp: 60 tablet, Rfl: 0    etonogestrel-ethinyl estradiol (NuvaRing) 0.12-0.015 MG/24HR vaginal ring, Insert vaginally and leave in place for 3 consecutive weeks, then remove for 1 week., Disp: 3 each, Rfl: 5    predniSONE 20 mg tablet, Take 2 tablets (40 mg total) by mouth daily for 5 days, Disp: 10 tablet, Rfl: 0    traZODone (DESYREL) 100 mg tablet, take 3 tablets by mouth daily at bedtime, Disp: 270 tablet, Rfl: 1    Current Allergies     Allergies as of 10/04/2024 - Reviewed 10/04/2024   Allergen Reaction Noted    Sulfa antibiotics Hives 04/09/2021            The following portions of the patient's history were reviewed and updated as appropriate: allergies, current medications, past family history, past medical history, past social history, past surgical history and problem list.     Past Medical History:   Diagnosis Date    Anxiety      Arthritis     Chronic pain     Neck. Numbness in fingers    Colon polyp     CTS (carpal tunnel syndrome)     Right    DDD (degenerative disc disease), cervical     DDD (degenerative disc disease), lumbar     Disease of thyroid gland     History of COVID-19     History of echocardiogram 11/26/2012    EF 55%, NWMA, Normal    Insomnia     Orbital floor (blow-out) closed fracture (HCC) 01/20/2011    Palpitations     Pneumonia 2008    WPW (Pedrito-Parkinson-White syndrome)        Past Surgical History:   Procedure Laterality Date    AUGMENTATION MAMMAPLASTY Bilateral 2010    AUGMENTATION MAMMAPLASTY Bilateral 04/01/2022    redone    BREAST SURGERY Bilateral     Augmentation    EPIDURAL BLOCK INJECTION N/A 05/03/2018    Procedure: CERVICAL EPIDURAL STEROID INJECTION;  Surgeon: Kye Webster MD;  [facility];  Service: Pain Management     WI DSTR NROLYTC AGNT PARVERTEB FCT SNGL LMBR/SACRAL Right 12/15/2016    Procedure: LUMBAR RADIOFREQUENCY LESIONING ;  Surgeon: Kye Webster MD;  [facility];  Service: Pain Management     WI NJX DX/THER AGT PVRT FACET JT CRV/THRC 1 LEVEL Right 06/07/2018    Procedure: C4-5 C5-6 C7-T1 CERVICAL MEDIAL BRANCH BLOCK;  Surgeon: Kye Webster MD;  Location: [facility];  Service: Pain Management     ROTATOR CUFF REPAIR Right     THYROIDECTOMY, PARTIAL      TRANSUMBILICAL AUGMENTATION MAMMAPLASTY Bilateral        Family History   Problem Relation Age of Onset    Endometrial cancer Mother 70    Arthritis Father     No Known Problems Sister     No Known Problems Daughter     No Known Problems Daughter     Lung cancer Maternal Grandmother     No Known Problems Paternal Grandmother     Lung cancer Maternal Aunt     Breast cancer Maternal Aunt 50    Breast cancer Maternal Aunt 58    No Known Problems Maternal Aunt     Cancer Paternal Aunt     Colon cancer Paternal Aunt 58    Breast cancer Paternal Aunt 45    Cancer Paternal Aunt     No Known Problems Paternal Aunt     Thyroid  cancer Cousin          Medications have been verified.        Objective   /76   Pulse 74   Temp 98.3 °F (36.8 °C)   Resp 18   SpO2 98%        Physical Exam     Physical Exam  Vitals reviewed.   Constitutional:       General: She is not in acute distress.     Appearance: She is ill-appearing.   HENT:      Right Ear: Tympanic membrane normal.      Left Ear: Tympanic membrane normal.      Nose: No congestion or rhinorrhea.      Mouth/Throat:      Pharynx: Posterior oropharyngeal erythema present. No oropharyngeal exudate.   Cardiovascular:      Rate and Rhythm: Normal rate and regular rhythm.      Heart sounds: Normal heart sounds.   Pulmonary:      Effort: Pulmonary effort is normal. No respiratory distress.      Breath sounds: Normal breath sounds. No wheezing, rhonchi or rales.   Lymphadenopathy:      Cervical: No cervical adenopathy.   Neurological:      Mental Status: She is alert.   Psychiatric:         Mood and Affect: Mood normal.         Behavior: Behavior normal.

## 2024-10-04 NOTE — LETTER
October 4, 2024     Patient: Emely Thibodeaux   YOB: 1970   Date of Visit: 10/4/2024       To Whom it May Concern:    Emely Thibodeaux was seen in my clinic on 10/4/2024. Patient is not COVID positive.    If you have any questions or concerns, please don't hesitate to call.         Sincerely,          Lucia Cardenas PA-C        CC: No Recipients

## 2024-10-06 LAB — BACTERIA THROAT CULT: NORMAL

## 2024-10-09 ENCOUNTER — NURSE TRIAGE (OUTPATIENT)
Age: 54
End: 2024-10-09

## 2024-10-09 NOTE — TELEPHONE ENCOUNTER
"Inbound call received from Patient warm transferred from [place] to report worsening symptoms. Patient said it is not stroke because they did 2 MRIs in August and it was ruled out. Per Dr. Castaneda's note on 09/11/2024: \"However, a neuromuscular disease does not explain her symptoms.\" And the provider also states \"If there are changes in her case that change the differential diagnosis I can always reevaluate her.\"     Segundo wants to know what else she can rule out or what she should do because it is not getting better. Patient asked if she should see Neurovascular or some other services for additional studies to determine what is going on.     Segundo was referred to also discuss with her PCP and the Patient states she has an appointment with Dr. Sparks tomorrow 10/10/2024.     Patient confirmed call back number [phone] and we may leave a detailed voicemail if needed.     Dr. Csataneda: Please advise, thank you!      Reason for Disposition   Nursing judgment    Answer Assessment - Initial Assessment Questions  1. REASON FOR CALL: \"What is your main concern right now?\"      Patient states her symptoms are getting worse and she does not know what to do. August 20th went to hospital because her right hand and right side had tremors and she was not able to make a peace sign or move her right hand. She cannot make a muscle in right leg. She feels like she stutters and stammers, since August as well. Patient said her symptoms are not getting better and it is getting worse.Patient explained if she bangs her right hand her skin peels away and she gets a purple bruise but only on her right side. Her right hand is different color and size. Segundo said the size is different because she has no muscle anymore. Segundo said her muscles on the right are dying.     Patient was asked what has worsened since she last saw Dr. Castaneda and Patient said her tremors in her right leg are more frequent. Her tremors occur a couple times an " "hour. Segundo said her muscles on her right side are disappearing.     2. ONSET: \"When did the tremors start?\"      Before August 20, 2024.          5. OTHER SYMPTOMS: \"Do you have any other new symptoms?\"      Patient said it is hard walk long distance, noticed 2 weeks ago. She feels like she has to drag her right leg.     Denies numbness or tingling. Denies bowel or bladder symptoms.    Protocols used: No Protocol Available-ADULT-[place]    "

## 2024-10-09 NOTE — TELEPHONE ENCOUNTER
Outbound call made to Patient unsuccessful call disconnected.    Additional outbound call made to Patient successful and Patient was made aware of Dr. Castaneda's advisement. Patient is frustrated but is hopeful the MD she sees tomorrow will have other ideas for studies. Patient asked for Medical Records phone number and was provided [phone]. Segundo was offered to be warm transferred but she is currently at work and states she will call later.

## 2024-10-09 NOTE — TELEPHONE ENCOUNTER
My area is neuromuscular disease.  The symptoms that she was having do not suggest a neuromuscular disease.  Neuromuscular diseases should be diffuse and not one-sided.  If she is having the same symptoms (or worsening but same symptoms) then I may not have much to offer.  But I am always happy to take another look at her.

## 2024-10-09 NOTE — TELEPHONE ENCOUNTER
Outbound call made to Patient and she was made aware of Dr. Castaneda's advisement, she verbalized understanding and did not wish to make an appointment with Dr. Castaneda. Patient is very frustrated and states she feels like she has to figure out her own health care and be her own . Patient said the primary provider just goes off her suggestions. Patient states she does not have a medical degree and she is seeking guidance from those who do.     Patient would like to clarify if Dr. Castaneda suggests a different Neurologist or if there is an area of specialty he would advise she consult.     Miriamtent made aware it is now after hours so she will mostly likely not hear from us again today. Patient made aware if she does have any sudden worsening of symptoms or new symptoms to go to ED for evaluation.     Dr. Castaneda: Please advise if able, thank you!

## 2024-10-09 NOTE — TELEPHONE ENCOUNTER
I understand that she is frustrated.  My area is neuromuscular disease.  Given that her symptoms are one-sided I would be concerned about something in the brain but this has not been confirmed.  I do not have other ideas at this point.  Again, I understand that this is frustrating.

## 2024-10-10 ENCOUNTER — OFFICE VISIT (OUTPATIENT)
Dept: FAMILY MEDICINE CLINIC | Facility: CLINIC | Age: 54
End: 2024-10-10
Payer: COMMERCIAL

## 2024-10-10 ENCOUNTER — DOCUMENTATION (OUTPATIENT)
Dept: HEMATOLOGY ONCOLOGY | Facility: CLINIC | Age: 54
End: 2024-10-10

## 2024-10-10 VITALS
SYSTOLIC BLOOD PRESSURE: 136 MMHG | DIASTOLIC BLOOD PRESSURE: 82 MMHG | BODY MASS INDEX: 21.47 KG/M2 | TEMPERATURE: 98 F | OXYGEN SATURATION: 97 % | HEIGHT: 67 IN | WEIGHT: 136.8 LBS | HEART RATE: 110 BPM

## 2024-10-10 DIAGNOSIS — I45.6 WPW (WOLFF-PARKINSON-WHITE SYNDROME): ICD-10-CM

## 2024-10-10 DIAGNOSIS — Z13.6 ENCOUNTER FOR SCREENING FOR STENOSIS OF CAROTID ARTERY: ICD-10-CM

## 2024-10-10 DIAGNOSIS — F44.4 FUNCTIONAL NEUROLOGICAL SYMPTOM DISORDER WITH WEAKNESS OR PARALYSIS: Primary | ICD-10-CM

## 2024-10-10 DIAGNOSIS — R23.3 EASY BRUISING: ICD-10-CM

## 2024-10-10 DIAGNOSIS — R53.1 RIGHT SIDED WEAKNESS: ICD-10-CM

## 2024-10-10 DIAGNOSIS — Z12.31 SCREENING MAMMOGRAM, ENCOUNTER FOR: ICD-10-CM

## 2024-10-10 PROCEDURE — 99214 OFFICE O/P EST MOD 30 MIN: CPT | Performed by: FAMILY MEDICINE

## 2024-10-10 NOTE — PROGRESS NOTES
Ambulatory Visit  Name: Emely Thibodeaux      : 1970      MRN: [id number]  Encounter Provider: Lyla Sparks MD  Encounter Date: 10/10/2024   Encounter department: 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  Reviewed diagnosis and treatments.  Advised CBT - patient declines at this time.  Advised PT/OT        Right sided weakness         Encounter for screening for stenosis of carotid artery  Patient would like to have her carotids checked but just had CTA which was normal. Will message patient that ultrasound is not necessary.       Easy bruising  Discussed normal labs so far  Will refer to hematology to see if any further work up is warranted.   Orders:    Ambulatory Referral to Hematology / Oncology; Future    Screening mammogram, encounter for    Orders:    Mammo screening bilateral w 3d and cad; Future    WPW (Pedrito-Parkinson-White syndrome)  Refer to cardiac electrophysiology   Orders:    Ambulatory Referral to Cardiac Electrophysiology; Future       History of Present Illness     54 year old here to discuss recent health issues.  She was hospitalized in August for R sided weakness. Extensive neurologic work up was completed and was unremarkable.   She has followed up with a neuromuscular specialist and neuromuscular disease was ruled out.  She continues with right sided weakness, and tremors in the R arm and leg. She feels that her muscles are atrophied on the right side of her body.  She says she can hardly walk from her car to a store or any distance.  She was referred to PT but hasn't started this yet.  She reports bruising in the right arm as well. She says her dog jumped on her yesterday and it caused bruising. She is not taking any blood thinners. She reports the bruising is only on her right side. CBC, INR, CMP have been normal.  She has  "been looking up her symptoms online and she wonders about lupus or purpura.  We reviewed labs that were done and rheumatologic tests were negative. Inflammatory markers are normal.  She has WPW syndrome and sees a general cardiologist, but was advised to see an electro cardiologist.  She needs a new referral. She does report having frequent tachycardia and palpitations.    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 She is on Trazodone and Alprazolam to help her sleep. She denies depression.  She gets about 3 hours of sleep,wakes up then goes back to sleep. Total 6 hours of sleep per night. Trouble shutting her mind off.  Denies any history of trauma.    SH: She is . Runs a . Denies tobacco use. 2-4 alcoholic drinks per week. No drug use.        Review of Systems   Constitutional: Negative.    HENT: Negative.     Respiratory: Negative.     Cardiovascular:  Positive for palpitations.   Endocrine: Negative.    Genitourinary: Negative.    Musculoskeletal:  Positive for arthralgias.   Allergic/Immunologic: Negative.    Neurological:  Positive for tremors, weakness and headaches. Negative for dizziness and facial asymmetry.   Hematological:  Bruises/bleeds easily.   Psychiatric/Behavioral:  The patient is nervous/anxious.            Objective     /82   Pulse (!) 110   Temp 98 °F (36.7 °C)   Ht 5' 7\" (1.702 m)   Wt 62.1 kg (136 lb 12.8 oz)   SpO2 97%   BMI 21.43 kg/m²     Physical Exam  Vitals and nursing note reviewed.   Constitutional:       General: She is not in acute distress.     Appearance: She is well-developed.   HENT:      Head: Normocephalic and atraumatic.      Mouth/Throat:      Mouth: Mucous membranes are moist.      Pharynx: Oropharynx is clear.   Eyes:      Extraocular Movements: Extraocular movements intact.      Conjunctiva/sclera: Conjunctivae normal.      Pupils: Pupils are equal, " round, and reactive to light.   Cardiovascular:      Rate and Rhythm: Regular rhythm. Tachycardia present.      Heart sounds: No murmur heard.  Pulmonary:      Effort: Pulmonary effort is normal. No respiratory distress.      Breath sounds: Normal breath sounds.   Abdominal:      Palpations: Abdomen is soft.      Tenderness: There is no abdominal tenderness.   Musculoskeletal:         General: No swelling.      Cervical back: Neck supple.      Right lower leg: No edema.      Left lower leg: No edema.   Skin:     General: Skin is warm and dry.      Capillary Refill: Capillary refill takes less than 2 seconds.      Findings: Bruising (some bruising on R forearm) present.   Neurological:      General: No focal deficit present.      Mental Status: She is alert.      Cranial Nerves: No cranial nerve deficit.      Motor: Tremor (on and off in R arm and R leg) present. No weakness.      Gait: Gait normal.      Deep Tendon Reflexes:      Reflex Scores:       Patellar reflexes are 2+ on the right side and 2+ on the left side.     Comments: Patient is able to stand on tip toes and do a squat.   Psychiatric:         Mood and Affect: Mood is anxious.

## 2024-10-10 NOTE — PROGRESS NOTES
Chart was clinically reviewed.  At this time the referral to hematology-oncology will be closed, as there is limited work-up for the specified referral diagnosis. Please place an order for “Amb e-consult to hematology “ and reason for referral in comment section to obtain recommendations for additional testing/work-up.? The e-consult will consist of one of our providers reviewing the patient's chart and sending any recommendations to referring provider to complete.? Once there is definitive information, patient can be referred back for in-person visit if appropriate.? This will be completed within 7 business days. Please notify patient. Thank you.

## 2024-10-10 NOTE — ASSESSMENT & PLAN NOTE
Refer to cardiac electrophysiology   Orders:    Ambulatory Referral to Cardiac Electrophysiology; Future

## 2024-10-10 NOTE — ASSESSMENT & PLAN NOTE
Discussed I believe she has functional neurological symptom disorder.  Reviewed diagnosis and treatments.  Advised CBT - patient declines at this time.  Advised PT/OT

## 2024-10-11 ENCOUNTER — TELEPHONE (OUTPATIENT)
Dept: ADMINISTRATIVE | Facility: OTHER | Age: 54
End: 2024-10-11

## 2024-10-11 ENCOUNTER — E-CONSULT (OUTPATIENT)
Dept: HEMATOLOGY ONCOLOGY | Facility: CLINIC | Age: 54
End: 2024-10-11
Payer: COMMERCIAL

## 2024-10-11 DIAGNOSIS — T14.8XXA BRUISING: Primary | ICD-10-CM

## 2024-10-11 DIAGNOSIS — I65.23 ATHEROSCLEROSIS OF BOTH CAROTID ARTERIES: Primary | ICD-10-CM

## 2024-10-11 DIAGNOSIS — D69.2 PURPURA (HCC): Primary | ICD-10-CM

## 2024-10-11 PROCEDURE — 99446 NTRPROF PH1/NTRNET/EHR 5-10: CPT | Performed by: PHYSICIAN ASSISTANT

## 2024-10-11 NOTE — TELEPHONE ENCOUNTER
Upon review of the In Basket request we were able to locate, review, and update the patient chart as requested for Pap Smear (HPV) aka Cervical Cancer Screening.    Any additional questions or concerns should be emailed to the Practice Liaisons via the appropriate education email address, please do not reply via In Basket.    Thank you  Boy Goddard MA   [facility]

## 2024-10-11 NOTE — TELEPHONE ENCOUNTER
----- Message from Lyla Sparks MD sent at 10/10/2024  2:48 PM EDT -----  Regarding: pap  10/10/24 2:48 PM    Ar, our patient Emely Thibodeaux has had Pap Smear (HPV) aka Cervical Cancer Screening completed/performed. Please assist in updating the patient chart by pulling the Care Everywhere (CE) document. The date of service is May 2023.     Thank you,  Lyla Sparks MD  [facility]

## 2024-10-11 NOTE — PROGRESS NOTES
Hello,  Patient is concerned for purpura, she has dark purple spots on her arm. Sent a mychart photo. She would like to be seen in person. Can hematology see her?  Dr. Sparks

## 2024-10-11 NOTE — PROGRESS NOTES
E-Consult  mEely Thibodeaux 54 y.o. female MRN: [id number]  Encounter Date: 10/11/24      Reason for Consult / Principal Problem: bruising, picture in Media on 10/11/24    Consulting Provider: Venecia Sousa PA-C    Requesting Provider: Lyla Sparks MD     ASSESSMENT:  Mild bruising on arm -- 10/11/24 picture in media in chart     RECOMMENDATIONS:  PTT, INR was normal in August 2024  CBC showed a normal platelet count  If patient had spontaneous bruising on her trunk this could be more concerning, on the arm it is rather normal  Taking NSAIDs or aspirin could increase frequency/severity of bruising, to ask patient if she has been taking any  Additionally folic acid could be better, recommend additional supplementation  B12 was also low in August 2024.  Deficiency is defined by less than 300 and hers was 312.  Would recommend supplementation of B12 as well  Could also consider assessment of vitamin C level as deficiency of this can lead to bruising    Total time spent 5-10 minutes, >50% of the total time devoted to medical consultative verbal/EMR discussion between providers. Written report will be generated in the EMR. .

## 2024-10-14 DIAGNOSIS — D69.2 PURPURA (HCC): Primary | ICD-10-CM

## 2024-10-15 ENCOUNTER — APPOINTMENT (OUTPATIENT)
Dept: LAB | Facility: CLINIC | Age: 54
End: 2024-10-15
Payer: COMMERCIAL

## 2024-10-15 DIAGNOSIS — R06.09 DYSPNEA ON EXERTION: ICD-10-CM

## 2024-10-15 DIAGNOSIS — R07.9 CHEST PAIN IN ADULT: ICD-10-CM

## 2024-10-15 DIAGNOSIS — D69.2 PURPURA (HCC): ICD-10-CM

## 2024-10-15 DIAGNOSIS — R25.2 CRAMPS OF LOWER EXTREMITY: ICD-10-CM

## 2024-10-15 LAB
ANION GAP SERPL CALCULATED.3IONS-SCNC: 8 MMOL/L (ref 4–13)
BUN SERPL-MCNC: 12 MG/DL (ref 5–25)
CALCIUM SERPL-MCNC: 8.5 MG/DL (ref 8.4–10.2)
CHLORIDE SERPL-SCNC: 105 MMOL/L (ref 96–108)
CO2 SERPL-SCNC: 26 MMOL/L (ref 21–32)
CREAT SERPL-MCNC: 0.69 MG/DL (ref 0.6–1.3)
GFR SERPL CREATININE-BSD FRML MDRD: 99 ML/MIN/1.73SQ M
GLUCOSE P FAST SERPL-MCNC: 93 MG/DL (ref 65–99)
MAGNESIUM SERPL-MCNC: 1.8 MG/DL (ref 1.9–2.7)
POTASSIUM SERPL-SCNC: 3.8 MMOL/L (ref 3.5–5.3)
SODIUM SERPL-SCNC: 139 MMOL/L (ref 135–147)

## 2024-10-15 PROCEDURE — 83735 ASSAY OF MAGNESIUM: CPT

## 2024-10-15 PROCEDURE — 36415 COLL VENOUS BLD VENIPUNCTURE: CPT

## 2024-10-15 PROCEDURE — 80048 BASIC METABOLIC PNL TOTAL CA: CPT

## 2024-10-15 PROCEDURE — 82180 ASSAY OF ASCORBIC ACID: CPT

## 2024-10-18 LAB — VIT C SERPL-MCNC: 0.3 MG/DL (ref 0.4–2)

## 2024-10-23 DIAGNOSIS — E83.42 HYPOMAGNESEMIA: Primary | ICD-10-CM

## 2024-10-23 RX ORDER — VITS A,C,E/LUTEIN/MINERALS 300MCG-200
200 TABLET ORAL DAILY
Qty: 3 TABLET | Refills: 0 | Status: SHIPPED | OUTPATIENT
Start: 2024-10-23

## 2024-10-24 ENCOUNTER — NURSE TRIAGE (OUTPATIENT)
Dept: CARDIOLOGY CLINIC | Facility: CLINIC | Age: 54
End: 2024-10-24

## 2024-10-24 NOTE — TELEPHONE ENCOUNTER
Magnesium Oxide 200mg unavailable at [facility]. They have a 250mg tablet if this dose is acceptable.    Please update prescription.

## 2024-10-24 NOTE — TELEPHONE ENCOUNTER
"Reason for Disposition  • Pharmacy calling with prescription question and triager unable to answer question     Ordered dose of magnesium is not available. Are able to provide a 250mg tablet if acceptable.    Answer Assessment - Initial Assessment Questions  1. NAME of MEDICINE: \"What medicine(s) are you calling about?\"      Magnesium Oxide 200mg    2. QUESTION: \"What is your question?\" (e.g., double dose of medicine, side effect)      [facility] does not carry and does not have ability to order this dose. States they do have a Magnesium 250mg dose. Will this work?    3. PRESCRIBER: \"Who prescribed the medicine?\" Reason: if prescribed by specialist, call should be referred to that group.      Leanna Davies PA-C    Protocols used: Medication Question Call-Adult-[place]    "

## 2024-10-24 NOTE — TELEPHONE ENCOUNTER
----- Message from Leanna Davies PA-C sent at 10/23/2024  2:07 PM EDT -----  Please advise patient her magnesium level is borderline low. I have sent magnesium 200 mg tablets, she should take one tablet daily for 3 days and recheck her magnesium level with labs in about 10 days. She should report symptoms out of the ordinary.  Her kidney function has remained stable and her potassium is within normal limits.

## 2024-10-29 DIAGNOSIS — E83.42 HYPOMAGNESEMIA: ICD-10-CM

## 2024-10-29 RX ORDER — PNV NO.95/FERROUS FUM/FOLIC AC 28MG-0.8MG
250 TABLET ORAL DAILY
Qty: 3 TABLET | Refills: 0 | Status: SHIPPED | OUTPATIENT
Start: 2024-10-29

## 2024-10-29 NOTE — TELEPHONE ENCOUNTER
Magnesium 250 mg daily sent to patient's pharmacy.  Please advise her to take 1 tablet daily for 3 days, followed by repeat blood work after 1 week.

## 2024-10-30 ENCOUNTER — TELEPHONE (OUTPATIENT)
Age: 54
End: 2024-10-30

## 2024-10-30 DIAGNOSIS — R53.1 RIGHT SIDED WEAKNESS: Primary | ICD-10-CM

## 2024-10-30 NOTE — TELEPHONE ENCOUNTER
[facility] to let us know patient came in today and brought the wrong referral in.  The one she brought in is from August and it is only for PT.  The referral needs to be for PT and OT.  She was in for Occupational Therapy at this visit.  She requests this be faxed to her asap at [phone].  Please call [phone] with any questions/concerns.

## 2024-10-30 NOTE — TELEPHONE ENCOUNTER
Patient needs a copy of her referral for PT from 8/22/24 which was given to her at the hospital.  She would like to swing by and pick it up, as she will be starting therapy today at [facility].  She will be there before 11AM.

## 2024-10-31 ENCOUNTER — TELEPHONE (OUTPATIENT)
Age: 54
End: 2024-10-31

## 2024-10-31 NOTE — TELEPHONE ENCOUNTER
Patient has an appointment with out-of-network Neurologist 10/31/24 at 11 am.    Please fax 9/5/24 Neurology Referral to her now at [phone] so she has it to take to her appointment.

## 2024-11-04 DIAGNOSIS — F41.9 ANXIETY: ICD-10-CM

## 2024-11-05 RX ORDER — ALPRAZOLAM 0.25 MG/1
TABLET ORAL
Qty: 60 TABLET | Refills: 0 | Status: SHIPPED | OUTPATIENT
Start: 2024-11-05

## 2024-11-07 ENCOUNTER — HOSPITAL ENCOUNTER (OUTPATIENT)
Dept: VASCULAR ULTRASOUND | Facility: HOSPITAL | Age: 54
Discharge: HOME | End: 2024-11-07
Payer: COMMERCIAL

## 2024-11-07 DIAGNOSIS — I65.23 ATHEROSCLEROSIS OF BOTH CAROTID ARTERIES: ICD-10-CM

## 2024-11-07 PROCEDURE — 93880 EXTRACRANIAL BILAT STUDY: CPT

## 2024-11-07 PROCEDURE — 93880 EXTRACRANIAL BILAT STUDY: CPT | Performed by: SURGERY

## 2024-11-24 DIAGNOSIS — E83.42 HYPOMAGNESEMIA: ICD-10-CM

## 2024-11-24 DIAGNOSIS — F41.9 ANXIETY: ICD-10-CM

## 2024-11-26 RX ORDER — PNV NO.95/FERROUS FUM/FOLIC AC 28MG-0.8MG
TABLET ORAL
Qty: 3 TABLET | Refills: 0 | OUTPATIENT
Start: 2024-11-26

## 2024-11-26 RX ORDER — ALPRAZOLAM 0.25 MG/1
0.25 TABLET ORAL 2 TIMES DAILY PRN
Qty: 60 TABLET | Refills: 0 | Status: SHIPPED | OUTPATIENT
Start: 2024-11-26

## 2024-11-26 NOTE — TELEPHONE ENCOUNTER
Please advise patient to obtain the previously ordered labs to recheck her magnesium levels, so that we can determine if she needs to continue taking a magnesium supplement.

## 2024-12-04 ENCOUNTER — CONSULT (OUTPATIENT)
Dept: CARDIOLOGY CLINIC | Facility: CLINIC | Age: 54
End: 2024-12-04
Payer: COMMERCIAL

## 2024-12-04 VITALS
SYSTOLIC BLOOD PRESSURE: 102 MMHG | HEART RATE: 76 BPM | BODY MASS INDEX: 21.43 KG/M2 | DIASTOLIC BLOOD PRESSURE: 52 MMHG | HEIGHT: 67 IN

## 2024-12-04 DIAGNOSIS — I45.6 WPW (WOLFF-PARKINSON-WHITE SYNDROME): ICD-10-CM

## 2024-12-04 DIAGNOSIS — I47.10 PAROXYSMAL SVT (SUPRAVENTRICULAR TACHYCARDIA) (HCC): Primary | ICD-10-CM

## 2024-12-04 PROCEDURE — 99214 OFFICE O/P EST MOD 30 MIN: CPT | Performed by: STUDENT IN AN ORGANIZED HEALTH CARE EDUCATION/TRAINING PROGRAM

## 2024-12-04 PROCEDURE — 93000 ELECTROCARDIOGRAM COMPLETE: CPT | Performed by: STUDENT IN AN ORGANIZED HEALTH CARE EDUCATION/TRAINING PROGRAM

## 2024-12-04 NOTE — PROGRESS NOTES
HEART AND VASCULAR  CARDIAC ELECTROPHYSIOLOGY   HEART RHYTHM CENTER  [facility]    Outpatient New Consult  for assessment and management of paroxysmal SVT, reported WPW syndrome  Today's Date: 12/04/24    Patient name: Emely Thibodeaux  YOB: 1970  Sex: female     Chief Complaint: as above    ASSESSMENT:  Problem List Items Addressed This Visit       WPW (Pedrito-Parkinson-White syndrome)    Relevant Orders    AMB extended holter monitor     Other Visit Diagnoses         Paroxysmal SVT (supraventricular tachycardia) (HCC)    -  Primary    Relevant Orders    POCT ECG    AMB extended holter monitor            PLAN:  Hx of brief paroxysmal SVT, reported WPW  -No evidence of pre-excitation on reviewed EKGs  -Repeat 30 day monitor ordered  -SVT episodes continue to be responsive to vagal maneuvers  -No medications started    History of anxiety  -Alprazolam 0.25mg BID PRN  -Trazodone 100mg    Right-sided weakness upper and lower extremities  -Patient has appointment scheduled with TIFFANY Young for further evaluation    Follow up in: 4 to 6 months    Orders Placed This Encounter   Procedures    AMB extended holter monitor    POCT ECG     There are no discontinued medications.      .............................................................................................    HPI/Subjective:   Ms. Emely Thibodeaux is a 54 year old female with a history of reported WPW, anxiety, GERD, and thyroid disorder (TSH within normal limits.  She was seen in outpatient cardiology clinic on 9/17/2024 at which time was noted she had been experiencing right-sided weakness and tremors since mid August for which she follows with neurology and her PCP.  She also complained of dyspnea on exertion and occasional episodes of chest discomfort.  She had a stress test performed in July 2024 which was negative for ischemia.  Coronary artery calcium score is ordered.  For her history of WPW she was referred to  electrophysiology.    On review of available records, no evidence of preexcitation noted on EKGs.  She had no sustained arrhythmias on her ambulatory Holter from January 2024 noting only a brief run of paroxysmal SVT lasting for 7 beats at 130 bpm.  Other symptom triggers were in the setting of sinus tachycardia.  She underwent an echocardiogram and July 2024 as well which revealed a normal ejection fraction of 55%, normal left and right atrial size, no significant valvular abnormalities.    Today, we discussed her diagnosis of WPW. Was diagnosed by a cardiologist with WPW in 2002 in [place].  At that time, she notes she was told that she needed ablation but did not proceed with it.  She was started on a medication although unclear what this medication is.  She states that over the years, she has had multiple episodes of rapid heart rates going up to 140s.  She states that when she has episodes, she uses vagal maneuvers which leads to resolution of her symptoms.  We reviewed her monitor results above.  She noted that when wearing the monitor she had issues as she kept losing the transmitter.  When she exercises, she notes her heart rates do get as fast as 180s to 190s based on machine readings on the cardio equipment.    She continues to have neurologic symptoms noting significant weakness on the right side both upper and lower extremities.  She states that she was seen by a private neurologist who performed an EGM with minimal concerning findings.  She has an appointment at [facility] next week for further evaluation.    At this time, I have no objective evidence of WPW.  She does have short runs of SVT, although the longest run on her monitor was 7 beats.  Per the patient, her SVT episodes continue be responsive to vagal maneuvers.    I placed an order for a 30-day ambulatory monitor to quantify her episodes of paroxysmal SVT.  She is going on a cruise at the beginning of January, therefore, she will have the  monitor placed when she returns.    Complete 12 point ROS reviewed and otherwise non pertinent or negative except as per HPI pertinent positives in Cardiovascular and Respiratory emphasized. Please see paper chart for outpatient clinic patients where the patient completed the 12 point ROS survey.           Past Medical History:   Diagnosis Date    Anxiety     Arthritis     Chronic pain     Neck. Numbness in fingers    Colon polyp     CTS (carpal tunnel syndrome)     Right    DDD (degenerative disc disease), cervical     DDD (degenerative disc disease), lumbar     Disease of thyroid gland     History of COVID-19     History of echocardiogram 11/26/2012    EF 55%, NWMA, Normal    Insomnia     Orbital floor (blow-out) closed fracture (HCC) 01/20/2011    Palpitations     Pneumonia 2008    WPW (Pedrito-Parkinson-White syndrome)        Allergies   Allergen Reactions    Sulfa Antibiotics Hives     I reviewed the Home Medication list and Allergies in the chart.   Scheduled Meds:  Current Outpatient Medications   Medication Sig Dispense Refill    albuterol (PROVENTIL HFA,VENTOLIN HFA) 90 mcg/act inhaler Inhale 2 puffs every 6 (six) hours as needed for wheezing 18 g 2    ALPRAZolam (XANAX) 0.25 mg tablet take 1 tablet by mouth twice a day if needed for anxiety 60 tablet 0    etonogestrel-ethinyl estradiol (NuvaRing) 0.12-0.015 MG/24HR vaginal ring Insert vaginally and leave in place for 3 consecutive weeks, then remove for 1 week. 3 each 5    traZODone (DESYREL) 100 mg tablet take 3 tablets by mouth daily at bedtime 270 tablet 1    Magnesium Oxide -Mg Supplement 250 MG TABS Take 1 tablet (250 mg total) by mouth in the morning Take 1 tablet daily for 3 days, followed by repeat blood work after 1 week. (Patient not taking: Reported on 12/4/2024) 3 tablet 0     No current facility-administered medications for this visit.     PRN Meds:.        Family History   Problem Relation Age of Onset    Endometrial cancer Mother 70     Arthritis Father     No Known Problems Sister     No Known Problems Daughter     No Known Problems Daughter     Lung cancer Maternal Grandmother     No Known Problems Paternal Grandmother     Lung cancer Maternal Aunt     Breast cancer Maternal Aunt 50    Breast cancer Maternal Aunt 58    No Known Problems Maternal Aunt     Cancer Paternal Aunt     Colon cancer Paternal Aunt 58    Breast cancer Paternal Aunt 45    Cancer Paternal Aunt     No Known Problems Paternal Aunt     Thyroid cancer Cousin        Social History     Socioeconomic History    Marital status: /Civil Union     Spouse name: Not on file    Number of children: Not on file    Years of education: Not on file    Highest education level: Not on file   Occupational History    Not on file   Tobacco Use    Smoking status: Former     Current packs/day: 0.00     Average packs/day: 0.3 packs/day for 10.0 years (2.5 ttl pk-yrs)     Types: Cigarettes     Start date: 2008     Quit date: 2018     Years since quittin.6    Smokeless tobacco: Never   Vaping Use    Vaping status: Never Used   Substance and Sexual Activity    Alcohol use: Not Currently     Alcohol/week: 1.0 standard drink of alcohol     Types: 1 Cans of beer per week     Comment: few times week    Drug use: No    Sexual activity: Yes     Birth control/protection: Male Sterilization   Other Topics Concern    Not on file   Social History Narrative    Caffeine use      Social Drivers of Health     Financial Resource Strain: Not on file   Food Insecurity: No Food Insecurity (2024)    Nursing - Inadequate Food Risk Classification     Worried About Running Out of Food in the Last Year: Never true     Ran Out of Food in the Last Year: Never true     Ran Out of Food in the Last Year: Not on file   Transportation Needs: No Transportation Needs (2024)    PRAPARE - Transportation     Lack of Transportation (Medical): No     Lack of Transportation (Non-Medical): No   Physical Activity:  "Not on file   Stress: Not on file   Social Connections: Not on file   Intimate Partner Violence: Not on file   Housing Stability: Low Risk  (8/20/2024)    Housing Stability Vital Sign     Unable to Pay for Housing in the Last Year: No     Number of Times Moved in the Last Year: 0     Homeless in the Last Year: No         OBJECTIVE:    /52 (BP Location: Left arm, Patient Position: Sitting, Cuff Size: Standard)   Pulse 76   Ht 5' 7\" (1.702 m)   BMI 21.43 kg/m² There were no vitals filed for this visit.  GEN: No acute distress, Alert and oriented, well appearing  HEENT: Normocephalic, atraumatic  CARDIOVASCULAR:  RRR, No murmur, rub, gallops S1,S2  LUNGS: Clear To auscultation bilaterally, normal effort, no rales, rhonchi, crackles   ABDOMEN:  nondistended, soft, nontender  EXTREMITIES/VASCULAR:  No edema. warm an well perfused.  PSYCH: Normal Affect,  linear speech pattern without evidence of psychosis.   NEURO: Left upper and lower extremity strength 5 out of 5, right upper and lower extremity strength 4 out of 5  GAIT:  Ambulates normally without difficulty  HEME: No bleeding, bruising, petechia, purpura   SKIN: No significant rashes on visibile skin, warm, no diaphoresis or pallor.     Lab Results:       LABS:      Chemistry        Component Value Date/Time    K 3.8 10/15/2024 1339    K 3.8 03/31/2020 1926     10/15/2024 1339     03/31/2020 1926    CO2 26 10/15/2024 1339    CO2 22 (L) 03/31/2020 1926    BUN 12 10/15/2024 1339    BUN 13 03/31/2020 1926    CREATININE 0.69 10/15/2024 1339    CREATININE 0.66 03/31/2020 1926        Component Value Date/Time    CALCIUM 8.5 10/15/2024 1339    CALCIUM 8.0 (L) 03/31/2020 1926    ALKPHOS 28 (L) 08/20/2024 1107    ALKPHOS 54 03/31/2020 1926    AST 13 08/20/2024 1107    AST 30 03/31/2020 1926    ALT 16 08/20/2024 1107    ALT 26 03/31/2020 1926            No results found for: \"CHOL\"  Lab Results   Component Value Date    HDL 94 08/21/2024    HDL 84 " "05/15/2024    HDL 89 10/13/2022     Lab Results   Component Value Date    LDLCALC 62 08/21/2024    LDLCALC 84 05/15/2024    LDLCALC 56 10/13/2022     Lab Results   Component Value Date    TRIG 93 08/21/2024    TRIG 89 05/15/2024    TRIG 109 10/13/2022     No results found for: \"CHOLHDL\"    IMAGING: VAS carotid complete study  Result Date: 11/7/2024  Narrative:  [facility] REPORT CLINICAL: Indications: Patient presents with multiple recent episodes of dizziness / near syncope lasting several seconds and right arm weakness since August. She also notes intermittent bleeding of right arm. Operative History: No cardiovascular surgeries noted Risk Factors The patient has history of previous smoking (quit 5-10yrs ago). Clinical Right Pressure:  100/54 mm Hg, Left Pressure:  102/50 mm Hg.  FINDINGS:  Right        Impression  PSV  EDV (cm/s)  Direction of Flow  Ratio  Dist. ICA 77  41   0.69  Mid. ICA 72  35  0.65  Prox. ICA    1 - 49%      78          28                      0.70  Dist CCA                 109          34                            Mid CCA                  111          32                      1.03  Prox CCA                 107          30                      0.86  Ext Carotid               89          12                      0.80  Prox Vert                 52          14  Antegrade                 Subclavian               111           5                            Innominate               124          23                             Left         Impression  PSV  EDV (cm/s)  Direction of Flow  Ratio  Dist. ICA                 74          31                      0.73  Mid. ICA                  74          34                      0.73  Prox. ICA    Normal       80          32                      0.78  Dist CCA                  98          32                            Mid CCA                  102          32                      0.95  Prox CCA                 107          36                            " Ext Carotid               78          16                      0.77  Prox Vert                 58          23  Antegrade                 Subclavian                95          14                               CONCLUSION: Impression  RIGHT: There is <50% stenosis noted in the internal carotid artery. Plaque is heterogenous and irregular. Vertebral artery flow is antegrade. There is no significant subclavian artery disease.  LEFT: There is no evidence of arterial disease throughout the extracranial carotid system. Vertebral artery flow is antegrade. There is no significant subclavian artery disease.  Compared to previous study on 2/17/2020, there is <50% stenosis noted in the right ICA.  SIGNATURE: Electronically Signed by: SHEKHAR WOODSON MD on 2024-11-07 03:59:48 PM    XR hand 2 vw right  Result Date: 11/4/2024  Narrative: History: Right hand pain and swelling. Technique: AP and lateral views of the right hand. Comparison: None. Findings: No acute bone or joint abnormalities. There is advanced narrowing at the first CMC joint with articular sclerosis and large marginal osteophyte. There is a punctate calcification on the margin of the second DIP joint. Joint spaces are not narrowed or do not display significant degenerative joint changes. Regional bones are intact. Soft tissues display no opaque foreign bodies.    Impression: Impression: Advanced right thumb base osteoarthritis. Workstation:[id number]       Cardiac testing:     I reviewed and interpreted the following LABS/EKG/TELE/IMAGING and below is summary of my interpretation (if data available):      Current EKG reveals sinus rhythm with sinus arrhythmia no evidence of preexcitation    Past EKGs reviewed with no overt evidence of preexcitation    Stress test 7/17/24  Stress ECG No ST deviation is noted. There were no arrhythmias during stress. The ECG was negative for ischemia.       Ambulatory monitor 7/31/2024  -One 7 beat run of SVT     ECHO  7/17/24    Left  Ventricle Left ventricular cavity size is normal. Wall thickness is normal. The left ventricular ejection fraction is 55%. Systolic function is normal. Wall motion is normal. Diastolic function is normal.   Right Ventricle Right ventricular cavity size is normal. Systolic function is normal. Wall thickness is normal.   Left Atrium The atrium is normal in size.   Right Atrium The atrium is normal in size.   Aortic Valve The aortic valve is trileaflet. The leaflets are not thickened. The leaflets are not calcified. The leaflets exhibit normal mobility. There is no evidence of regurgitation. The aortic valve has no significant stenosis.   Mitral Valve Mitral valve structure is normal.  There is trace regurgitation. There is no evidence of stenosis.   Tricuspid Valve Tricuspid valve structure is normal. There is mild regurgitation. There is no evidence of stenosis.   Pulmonic Valve Pulmonic valve structure is normal. There is no evidence of regurgitation. There is no evidence of stenosis.   Ascending Aorta The aortic root is normal in size.   IVC/SVC The inferior vena cava is normal in size.   Pericardium There is no pericardial effusion.

## 2024-12-11 ENCOUNTER — OFFICE VISIT (OUTPATIENT)
Dept: FAMILY MEDICINE CLINIC | Facility: CLINIC | Age: 54
End: 2024-12-11
Payer: COMMERCIAL

## 2024-12-11 VITALS
WEIGHT: 138 LBS | BODY MASS INDEX: 21.66 KG/M2 | DIASTOLIC BLOOD PRESSURE: 74 MMHG | HEART RATE: 97 BPM | OXYGEN SATURATION: 97 % | HEIGHT: 67 IN | TEMPERATURE: 97.8 F | SYSTOLIC BLOOD PRESSURE: 106 MMHG

## 2024-12-11 DIAGNOSIS — F44.4 FUNCTIONAL NEUROLOGICAL SYMPTOM DISORDER WITH WEAKNESS OR PARALYSIS: ICD-10-CM
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
## 2024-12-11 DIAGNOSIS — S41.151A DOG BITE OF RIGHT UPPER EXTREMITY, INITIAL ENCOUNTER: Primary | ICD-10-CM

## 2024-12-11 PROCEDURE — 99213 OFFICE O/P EST LOW 20 MIN: CPT | Performed by: PHYSICIAN ASSISTANT

## 2024-12-11 NOTE — PROGRESS NOTES
Name: Emely Thibodeaux      : 1970      MRN: [id number]  Encounter Provider: Jun Wyatt PA-C  Encounter Date: 2024   Encounter department: [facility]    Assessment & Plan  Dog bite of right upper extremity, initial encounter  Cover with augmentin. 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 She sees YANI tomorrow for second opinion    She also notes accidental dog bite to R forearm 1 week ago while playing with her dog. No fevers, chills.     Also inquires about shingrix vaccine       Review of Systems   Constitutional:  Negative for chills, fatigue and fever.   HENT:  Negative for congestion, ear pain, hearing loss, nosebleeds, postnasal drip, rhinorrhea, sinus pressure, sinus pain, sneezing and sore throat.    Eyes:  Negative for pain, discharge, itching and visual disturbance.   Respiratory:  Negative for cough, chest tightness, shortness of breath and wheezing.    Cardiovascular:  Negative for chest pain, palpitations and leg swelling.   Gastrointestinal:  Negative for abdominal pain, blood in stool, constipation, diarrhea, nausea and vomiting.   Neurological:  Negative for dizziness, light-headedness and numbness.     Past Medical History:   Diagnosis Date   • Anxiety    • Arthritis    • Chronic pain     Neck. Numbness in fingers   • Colon polyp    • CTS (carpal tunnel syndrome)     Right   • DDD (degenerative disc disease), cervical    • DDD (degenerative disc disease), lumbar    • Disease of thyroid  gland    • History of COVID-19    • History of echocardiogram 11/26/2012    EF 55%, NWMA, Normal   • Insomnia    • Orbital floor (blow-out) closed fracture (HCC) 01/20/2011   • Palpitations    • Pneumonia 2008   • WPW (Pedrito-Parkinson-White syndrome)      Past Surgical History:   Procedure Laterality Date   • AUGMENTATION MAMMAPLASTY Bilateral 2010   • AUGMENTATION MAMMAPLASTY Bilateral 04/01/2022    redone   • BREAST SURGERY Bilateral     Augmentation   • EPIDURAL BLOCK INJECTION N/A 05/03/2018    Procedure: CERVICAL EPIDURAL STEROID INJECTION;  Surgeon: Kye Webster MD;  Location: [facility];  Service: Pain Management    • SD DSTR NROLYTC AGNT PARVERTEB FCT SNGL LMBR/SACRAL Right 12/15/2016    Procedure: LUMBAR RADIOFREQUENCY LESIONING ;  Surgeon: Kye Webster MD;  Location: [facility];  Service: Pain Management    • SD NJX DX/THER AGT PVRT FACET JT CRV/THRC 1 LEVEL Right 06/07/2018    Procedure: C4-5 C5-6 C7-T1 CERVICAL MEDIAL BRANCH BLOCK;  Surgeon: Kye Webster MD;  Location: [facility];  Service: Pain Management    • ROTATOR CUFF REPAIR Right    • THYROIDECTOMY, PARTIAL     • TRANSUMBILICAL AUGMENTATION MAMMAPLASTY Bilateral      Family History   Problem Relation Age of Onset   • Endometrial cancer Mother 70   • Arthritis Father    • No Known Problems Sister    • No Known Problems Daughter    • No Known Problems Daughter    • Lung cancer Maternal Grandmother    • No Known Problems Paternal Grandmother    • Lung cancer Maternal Aunt    • Breast cancer Maternal Aunt 50   • Breast cancer Maternal Aunt 58   • No Known Problems Maternal Aunt    • Cancer Paternal Aunt    • Colon cancer Paternal Aunt 58   • Breast cancer Paternal Aunt 45   • Cancer Paternal Aunt    • No Known Problems Paternal Aunt    • Thyroid cancer Cousin      Social History     Tobacco Use   • Smoking status: Former     Current packs/day: 0.00     Average packs/day: 0.3 packs/day for 10.0 years (2.5 ttl pk-yrs)     Types: Cigarettes      "Start date: 2008     Quit date: 2018     Years since quittin.6   • Smokeless tobacco: Never   Vaping Use   • Vaping status: Never Used   Substance and Sexual Activity   • Alcohol use: Not Currently     Alcohol/week: 1.0 standard drink of alcohol     Types: 1 Cans of beer per week     Comment: few times week   • Drug use: No   • Sexual activity: Yes     Birth control/protection: Male Sterilization     Current Outpatient Medications on File Prior to Visit   Medication Sig   • albuterol (PROVENTIL HFA,VENTOLIN HFA) 90 mcg/act inhaler Inhale 2 puffs every 6 (six) hours as needed for wheezing   • ALPRAZolam (XANAX) 0.25 mg tablet take 1 tablet by mouth twice a day if needed for anxiety   • etonogestrel-ethinyl estradiol (NuvaRing) 0.12-0.015 MG/24HR vaginal ring Insert vaginally and leave in place for 3 consecutive weeks, then remove for 1 week.   • Magnesium Oxide -Mg Supplement 250 MG TABS Take 1 tablet (250 mg total) by mouth in the morning Take 1 tablet daily for 3 days, followed by repeat blood work after 1 week. (Patient not taking: Reported on 2024)   • traZODone (DESYREL) 100 mg tablet take 3 tablets by mouth daily at bedtime     Allergies   Allergen Reactions   • Sulfa Antibiotics Hives     Immunization History   Administered Date(s) Administered   • Tuberculin Skin Test-PPD Intradermal 10/24/2017, 10/02/2019, 10/21/2019, 2024     Objective   /74 (BP Location: Left arm, Patient Position: Sitting, Cuff Size: Large)   Pulse 97   Temp 97.8 °F (36.6 °C)   Ht 5' 7\" (1.702 m)   Wt 62.6 kg (138 lb)   SpO2 97%   BMI 21.61 kg/m²     Physical Exam  Vitals and nursing note reviewed.   Constitutional:       General: She is not in acute distress.     Appearance: She is well-developed.   HENT:      Head: Normocephalic and atraumatic.   Eyes:      Conjunctiva/sclera: Conjunctivae normal.   Cardiovascular:      Rate and Rhythm: Normal rate and regular rhythm.      Heart sounds: No murmur " heard.  Pulmonary:      Effort: Pulmonary effort is normal. No respiratory distress.      Breath sounds: Normal breath sounds. No wheezing, rhonchi or rales.   Musculoskeletal:         General: No swelling.      Cervical back: Neck supple.   Skin:     General: Skin is warm and dry.          Neurological:      Mental Status: She is alert.      Comments: Chronic R sided deficits

## 2024-12-19 ENCOUNTER — CLINICAL SUPPORT (OUTPATIENT)
Dept: FAMILY MEDICINE CLINIC | Facility: CLINIC | Age: 54
End: 2024-12-19
Payer: COMMERCIAL

## 2024-12-19 DIAGNOSIS — Z23 ENCOUNTER FOR IMMUNIZATION: Primary | ICD-10-CM

## 2024-12-19 PROCEDURE — 90750 HZV VACC RECOMBINANT IM: CPT

## 2024-12-19 PROCEDURE — 90471 IMMUNIZATION ADMIN: CPT

## 2024-12-24 DIAGNOSIS — G47.00 INSOMNIA, UNSPECIFIED TYPE: ICD-10-CM

## 2024-12-24 RX ORDER — TRAZODONE HYDROCHLORIDE 100 MG/1
300 TABLET ORAL
Qty: 270 TABLET | Refills: 1 | Status: SHIPPED | OUTPATIENT
Start: 2024-12-24

## 2025-01-10 DIAGNOSIS — F41.9 ANXIETY: ICD-10-CM

## 2025-01-13 RX ORDER — ALPRAZOLAM 0.25 MG/1
0.25 TABLET ORAL 2 TIMES DAILY PRN
Qty: 60 TABLET | Refills: 0 | Status: SHIPPED | OUTPATIENT
Start: 2025-01-13

## 2025-01-22 ENCOUNTER — TELEPHONE (OUTPATIENT)
Age: 55
End: 2025-01-22

## 2025-01-22 DIAGNOSIS — I47.10 PAROXYSMAL SVT (SUPRAVENTRICULAR TACHYCARDIA) (HCC): Primary | ICD-10-CM

## 2025-01-22 NOTE — TELEPHONE ENCOUNTER
Caller: Emely Thibodeaux    Doctor: Luis    Reason for call: Pt came back from vacation and is ready for the ZIO to be ordered and mailed.    Please confirm with the PT and let her know when she should expect it to arrive. Thank you.    Call back#: [phone]

## 2025-02-02 DIAGNOSIS — F41.9 ANXIETY: ICD-10-CM

## 2025-02-03 RX ORDER — ALPRAZOLAM 0.25 MG/1
0.25 TABLET ORAL 2 TIMES DAILY PRN
Qty: 60 TABLET | Refills: 0 | Status: SHIPPED | OUTPATIENT
Start: 2025-02-03

## 2025-02-06 DIAGNOSIS — I47.10 PAROXYSMAL SVT (SUPRAVENTRICULAR TACHYCARDIA) (HCC): Primary | ICD-10-CM

## 2025-02-18 ENCOUNTER — CLINICAL SUPPORT (OUTPATIENT)
Dept: FAMILY MEDICINE CLINIC | Facility: CLINIC | Age: 55
End: 2025-02-18
Payer: COMMERCIAL

## 2025-02-18 DIAGNOSIS — Z23 ENCOUNTER FOR IMMUNIZATION: Primary | ICD-10-CM

## 2025-02-18 PROCEDURE — 90750 HZV VACC RECOMBINANT IM: CPT

## 2025-02-18 PROCEDURE — 90471 IMMUNIZATION ADMIN: CPT

## 2025-03-20 DIAGNOSIS — F41.9 ANXIETY: ICD-10-CM

## 2025-03-21 RX ORDER — ALPRAZOLAM 0.25 MG
0.25 TABLET ORAL 2 TIMES DAILY PRN
Qty: 60 TABLET | Refills: 0 | Status: SHIPPED | OUTPATIENT
Start: 2025-03-21

## 2025-04-08 DIAGNOSIS — F41.9 ANXIETY: ICD-10-CM

## 2025-04-09 RX ORDER — ALPRAZOLAM 0.25 MG
0.25 TABLET ORAL 2 TIMES DAILY PRN
Qty: 60 TABLET | OUTPATIENT
Start: 2025-04-09

## 2025-04-21 DIAGNOSIS — F41.9 ANXIETY: ICD-10-CM

## 2025-04-22 RX ORDER — ALPRAZOLAM 0.25 MG
0.25 TABLET ORAL 2 TIMES DAILY PRN
Qty: 60 TABLET | Refills: 0 | Status: SHIPPED | OUTPATIENT
Start: 2025-04-22

## 2025-04-29 ENCOUNTER — OFFICE VISIT (OUTPATIENT)
Dept: CARDIOLOGY CLINIC | Facility: CLINIC | Age: 55
End: 2025-04-29
Payer: COMMERCIAL

## 2025-04-29 VITALS
WEIGHT: 142 LBS | HEART RATE: 71 BPM | BODY MASS INDEX: 22.29 KG/M2 | DIASTOLIC BLOOD PRESSURE: 78 MMHG | HEIGHT: 67 IN | SYSTOLIC BLOOD PRESSURE: 110 MMHG

## 2025-04-29 DIAGNOSIS — I47.10 PAROXYSMAL SVT (SUPRAVENTRICULAR TACHYCARDIA) (HCC): Primary | ICD-10-CM

## 2025-04-29 DIAGNOSIS — I45.6 WPW (WOLFF-PARKINSON-WHITE SYNDROME): ICD-10-CM

## 2025-04-29 DIAGNOSIS — M62.81 MUSCLE WEAKNESS OF RIGHT ARM: ICD-10-CM

## 2025-04-29 DIAGNOSIS — F41.9 ANXIETY: ICD-10-CM

## 2025-04-29 LAB
ATRIAL RATE: 71 BPM
P AXIS: 76 DEGREES
PR INTERVAL: 140 MS
QRS AXIS: 81 DEGREES
QRSD INTERVAL: 84 MS
QT INTERVAL: 388 MS
QTC INTERVAL: 422 MS
T WAVE AXIS: 68 DEGREES
VENTRICULAR RATE: 71 BPM

## 2025-04-29 PROCEDURE — 99213 OFFICE O/P EST LOW 20 MIN: CPT | Performed by: STUDENT IN AN ORGANIZED HEALTH CARE EDUCATION/TRAINING PROGRAM

## 2025-04-29 PROCEDURE — 93000 ELECTROCARDIOGRAM COMPLETE: CPT | Performed by: STUDENT IN AN ORGANIZED HEALTH CARE EDUCATION/TRAINING PROGRAM

## 2025-04-29 NOTE — PROGRESS NOTES
HEART AND VASCULAR  CARDIAC ELECTROPHYSIOLOGY   [facility]M [facility]ENTER  Formerly [facility]    Outpatient follow up for assessment and management of paroxysmal SVT, reported WPW syndrome  Today's Date: 04/29/25    Patient name: Emely Thibodeaux  YOB: 1970  Sex: female     Chief Complaint: as above    ASSESSMENT:  Problem List Items Addressed This Visit       Anxiety    WPW (Pedrito-Parkinson-White syndrome) - Primary    Muscle weakness of right arm         PLAN:  Hx of brief paroxysmal SVT, reported WPW  -No evidence of pre-excitation on reviewed EKGs  -Repeat 30 day monitor ordered revealing 8 SVT runs longest lasting 9 beats and a low SVE burden at 1.1%.  -No significant arrhythmias documented  -SVT episodes continue to be responsive to vagal maneuvers  -No medications started per patient request  - Patient declines loop recorder    History of PACs with a burden of 1.1% on monitor  - Patient declines beta-blocker at this time    History of anxiety  -Alprazolam 0.25mg BID PRN  -Trazodone 100mg    Right-sided weakness upper and lower extremities  -Patient has appointment scheduled with TIFFANY Young for further evaluation    Follow up in: 1 year    No orders of the defined types were placed in this encounter.    There are no discontinued medications.      .............................................................................................    HPI/Subjective:   Ms. Emely Thibodeaux is a 54 year old female with a history of reported WPW, anxiety, GERD, and thyroid disorder (TSH within normal limits.  She was seen in outpatient cardiology clinic on 9/17/2024 at which time was noted she had been experiencing right-sided weakness and tremors since mid August for which she follows with neurology and her PCP.  She also complained of dyspnea on exertion and occasional episodes of chest discomfort.  She had a stress test performed in July 2024 which was negative for ischemia.  Coronary artery calcium  score is ordered.  For her history of WPW she was referred to electrophysiology.    On review of available records, no evidence of preexcitation noted on EKGs.  She had no sustained arrhythmias on her ambulatory Holter from January 2024 noting only a brief run of paroxysmal SVT lasting for 7 beats at 130 bpm.  Other symptom triggers were in the setting of sinus tachycardia.  She underwent an echocardiogram and July 2024 as well which revealed a normal ejection fraction of 55%, normal left and right atrial size, no significant valvular abnormalities.    She was seen in outpatient EP where we discussed her diagnosis of WPW. Was diagnosed by a cardiologist with WPW in 2002 in [place].  At that time, she notes she was told that she needed ablation but did not proceed with it.  She was started on a medication although unclear what this medication is.  She states that over the years, she has had multiple episodes of rapid heart rates going up to 140s.  She states that when she has episodes, she uses vagal maneuvers which leads to resolution of her symptoms.  We reviewed her monitor results above.  She noted that when wearing the monitor she had issues as she kept losing the transmitter.  When she exercises, she notes her heart rates do get as fast as 180s to 190s based on machine readings on the cardio equipment.    She continues to have neurologic symptoms noting significant weakness on the right side both upper and lower extremities.  She states that she was seen by a private neurologist who performed an EGM with minimal concerning findings.  She has an appointment at [facility] next week for further evaluation.    I placed an order for a 30-day ambulatory monitor to quantify her episodes of paroxysmal SVT.  Results of that monitor from February revealed no significant arrhythmias with the longest SVT run of 9 beats.    Today, she notes she feels well.  She continues to have palpitations 4-5 times a week lasting only  for seconds.  She feels like these are improving over time.  She denies chest pain, prolonged palpitations, shortness of breath, dyspnea on exertion, syncope, near syncope, lightheadedness, dizziness.    We discussed options for management.  She does have PACs with a burden of 1% per the monitor.  We discussed the possibility of starting a beta-blocker to which she declined.  We discussed the possibility of loop recorder for long-term monitoring to which she declined.  We discussed EP study with attempted arrhythmia induction, but we both agree this would be aggressive given she has not had any sustained arrhythmias documented of late.    In the end, decision was made to schedule follow-up for 1 year.  She will contact the office should she have worsening symptoms or increasing episodes of palpitations.  The next step would likely be initiation of low-dose metoprolol if the above occurred.    Complete 12 point ROS reviewed and otherwise non pertinent or negative except as per HPI pertinent positives in Cardiovascular and Respiratory emphasized. Please see paper chart for outpatient clinic patients where the patient completed the 12 point ROS survey.           Past Medical History:   Diagnosis Date    Anxiety     Arthritis     Chronic pain     Neck. Numbness in fingers    Colon polyp     CTS (carpal tunnel syndrome)     Right    DDD (degenerative disc disease), cervical     DDD (degenerative disc disease), lumbar     Disease of thyroid gland     History of COVID-19     History of echocardiogram 11/26/2012    EF 55%, NWMA, Normal    Insomnia     Orbital floor (blow-out) closed fracture (HCC) 01/20/2011    Palpitations     Pneumonia 2008    WPW (Pedrito-Parkinson-White syndrome)        Allergies   Allergen Reactions    Sulfa Antibiotics Hives     I reviewed the Home Medication list and Allergies in the chart.   Scheduled Meds:  Current Outpatient Medications   Medication Sig Dispense Refill    albuterol (PROVENTIL  HFA,VENTOLIN HFA) 90 mcg/act inhaler Inhale 2 puffs every 6 (six) hours as needed for wheezing 18 g 2    ALPRAZolam (XANAX) 0.25 mg tablet take 1 tablet by mouth twice a day if needed for anxiety 60 tablet 0    etonogestrel-ethinyl estradiol (NuvaRing) 0.12-0.015 MG/24HR vaginal ring Insert vaginally and leave in place for 3 consecutive weeks, then remove for 1 week. 3 each 5    Magnesium Oxide -Mg Supplement 250 MG TABS Take 1 tablet (250 mg total) by mouth in the morning Take 1 tablet daily for 3 days, followed by repeat blood work after 1 week. (Patient not taking: Reported on 2024) 3 tablet 0    traZODone (DESYREL) 100 mg tablet take 3 tablets by mouth daily at bedtime 270 tablet 1     No current facility-administered medications for this visit.     PRN Meds:.        Family History   Problem Relation Age of Onset    Endometrial cancer Mother 70    Arthritis Father     No Known Problems Sister     No Known Problems Daughter     No Known Problems Daughter     Lung cancer Maternal Grandmother     No Known Problems Paternal Grandmother     Lung cancer Maternal Aunt     Breast cancer Maternal Aunt 50    Breast cancer Maternal Aunt 58    No Known Problems Maternal Aunt     Cancer Paternal Aunt     Colon cancer Paternal Aunt 58    Breast cancer Paternal Aunt 45    Cancer Paternal Aunt     No Known Problems Paternal Aunt     Thyroid cancer Cousin        Social History     Socioeconomic History    Marital status: /Civil Union     Spouse name: Not on file    Number of children: Not on file    Years of education: Not on file    Highest education level: Not on file   Occupational History    Not on file   Tobacco Use    Smoking status: Former     Current packs/day: 0.00     Average packs/day: 0.3 packs/day for 10.0 years (2.5 ttl pk-yrs)     Types: Cigarettes     Start date: 2008     Quit date: 2018     Years since quittin.0    Smokeless tobacco: Never   Vaping Use    Vaping status: Never Used    Substance and Sexual Activity    Alcohol use: Not Currently     Alcohol/week: 1.0 standard drink of alcohol     Types: 1 Cans of beer per week     Comment: few times week    Drug use: No    Sexual activity: Yes     Birth control/protection: Male Sterilization   Other Topics Concern    Not on file   Social History Narrative    Caffeine use      Social Drivers of Health     Financial Resource Strain: Not on file   Food Insecurity: No Food Insecurity (8/20/2024)    Nursing - Inadequate Food Risk Classification     Worried About Running Out of Food in the Last Year: Never true     Ran Out of Food in the Last Year: Never true     Ran Out of Food in the Last Year: Not on file   Transportation Needs: No Transportation Needs (8/20/2024)    PRAPARE - Transportation     Lack of Transportation (Medical): No     Lack of Transportation (Non-Medical): No   Physical Activity: Not on file   Stress: Not on file   Social Connections: Not on file   Intimate Partner Violence: Not on file   Housing Stability: Low Risk  (8/20/2024)    Housing Stability Vital Sign     Unable to Pay for Housing in the Last Year: No     Number of Times Moved in the Last Year: 0     Homeless in the Last Year: No         OBJECTIVE:    There were no vitals taken for this visit.There were no vitals filed for this visit.  GEN: No acute distress, Alert and oriented, well appearing  HEENT: Normocephalic, atraumatic  CARDIOVASCULAR:  RRR, No murmur, rub, gallops S1,S2  LUNGS: Clear To auscultation bilaterally, normal effort, no rales, rhonchi, crackles   ABDOMEN:  nondistended, soft, nontender  EXTREMITIES/VASCULAR:  No edema. warm an well perfused.  PSYCH: Normal Affect,  linear speech pattern without evidence of psychosis.   NEURO: Left upper and lower extremity strength 5 out of 5, right upper and lower extremity strength 4 out of 5  GAIT:  Ambulates normally without difficulty  HEME: No bleeding, bruising, petechia, purpura   SKIN: No significant rashes on  "visibile skin, warm, no diaphoresis or pallor.     Lab Results:       LABS:      Chemistry        Component Value Date/Time    K 3.8 10/15/2024 1339    K 3.8 03/31/2020 1926     10/15/2024 1339     03/31/2020 1926    CO2 26 10/15/2024 1339    CO2 22 (L) 03/31/2020 1926    BUN 12 10/15/2024 1339    BUN 13 03/31/2020 1926    CREATININE 0.69 10/15/2024 1339    CREATININE 0.66 03/31/2020 1926        Component Value Date/Time    CALCIUM 8.5 10/15/2024 1339    CALCIUM 8.0 (L) 03/31/2020 1926    ALKPHOS 28 (L) 08/20/2024 1107    ALKPHOS 54 03/31/2020 1926    AST 13 08/20/2024 1107    AST 30 03/31/2020 1926    ALT 16 08/20/2024 1107    ALT 26 03/31/2020 1926            No results found for: \"CHOL\"  Lab Results   Component Value Date    HDL 94 08/21/2024    HDL 84 05/15/2024    HDL 89 10/13/2022     Lab Results   Component Value Date    LDLCALC 62 08/21/2024    LDLCALC 84 05/15/2024    LDLCALC 56 10/13/2022     Lab Results   Component Value Date    TRIG 93 08/21/2024    TRIG 89 05/15/2024    TRIG 109 10/13/2022     No results found for: \"CHOLHDL\"    IMAGING: VAS carotid complete study  Result Date: 11/7/2024  Narrative:  [facility] REPORT CLINICAL: Indications: Patient presents with multiple recent episodes of dizziness / near syncope lasting several seconds and right arm weakness since August. She also notes intermittent bleeding of right arm. Operative History: No cardiovascular surgeries noted Risk Factors The patient has history of previous smoking (quit 5-10yrs ago). Clinical Right Pressure:  100/54 mm Hg, Left Pressure:  102/50 mm Hg.  FINDINGS:  Right        Impression  PSV  EDV (cm/s)  Direction of Flow  Ratio  Dist. ICA 77  41   0.69  Mid. ICA 72  35  0.65  Prox. ICA    1 - 49%      78          28                      0.70  Dist CCA                 109          34                            Mid CCA                  111          32                      1.03  Prox CCA                 107          " 30                      0.86  Ext Carotid               89          12                      0.80  Prox Vert                 52          14  Antegrade                 Subclavian               111           5                            Innominate               124          23                             Left         Impression  PSV  EDV (cm/s)  Direction of Flow  Ratio  Dist. ICA                 74          31                      0.73  Mid. ICA                  74          34                      0.73  Prox. ICA    Normal       80          32                      0.78  Dist CCA                  98          32                            Mid CCA                  102          32                      0.95  Prox CCA                 107          36                            Ext Carotid               78          16                      0.77  Prox Vert                 58          23  Antegrade                 Subclavian                95          14                               CONCLUSION: Impression  RIGHT: There is <50% stenosis noted in the internal carotid artery. Plaque is heterogenous and irregular. Vertebral artery flow is antegrade. There is no significant subclavian artery disease.  LEFT: There is no evidence of arterial disease throughout the extracranial carotid system. Vertebral artery flow is antegrade. There is no significant subclavian artery disease.  Compared to previous study on 2/17/2020, there is <50% stenosis noted in the right ICA.  SIGNATURE: Electronically Signed by: SHEKHAR WOODSON MD on 2024-11-07 03:59:48 PM    XR hand 2 vw right  Result Date: 11/4/2024  Narrative: History: Right hand pain and swelling. Technique: AP and lateral views of the right hand. Comparison: None. Findings: No acute bone or joint abnormalities. There is advanced narrowing at the first CMC joint with articular sclerosis and large marginal osteophyte. There is a punctate calcification on the margin of the second DIP joint.  Joint spaces are not narrowed or do not display significant degenerative joint changes. Regional bones are intact. Soft tissues display no opaque foreign bodies.    Impression: Impression: Advanced right thumb base osteoarthritis. Workstation:[id number]       Cardiac testing:     I reviewed and interpreted the following LABS/EKG/TELE/IMAGING and below is summary of my interpretation (if data available):      Current EKG reveals sinus rhythm with occasional PACs    Past EKGs reviewed with no overt evidence of preexcitation    Stress test 7/17/24  Stress ECG No ST deviation is noted. There were no arrhythmias during stress. The ECG was negative for ischemia.       Ambulatory monitor 7/31/2024  -One 7 beat run of SVT     ECHO  7/17/24    Left Ventricle Left ventricular cavity size is normal. Wall thickness is normal. The left ventricular ejection fraction is 55%. Systolic function is normal. Wall motion is normal. Diastolic function is normal.   Right Ventricle Right ventricular cavity size is normal. Systolic function is normal. Wall thickness is normal.   Left Atrium The atrium is normal in size.   Right Atrium The atrium is normal in size.   Aortic Valve The aortic valve is trileaflet. The leaflets are not thickened. The leaflets are not calcified. The leaflets exhibit normal mobility. There is no evidence of regurgitation. The aortic valve has no significant stenosis.   Mitral Valve Mitral valve structure is normal.  There is trace regurgitation. There is no evidence of stenosis.   Tricuspid Valve Tricuspid valve structure is normal. There is mild regurgitation. There is no evidence of stenosis.   Pulmonic Valve Pulmonic valve structure is normal. There is no evidence of regurgitation. There is no evidence of stenosis.   Ascending Aorta The aortic root is normal in size.   IVC/SVC The inferior vena cava is normal in size.   Pericardium There is no pericardial effusion.

## 2025-05-23 DIAGNOSIS — F41.9 ANXIETY: ICD-10-CM

## 2025-05-27 ENCOUNTER — OFFICE VISIT (OUTPATIENT)
Dept: URGENT CARE | Facility: CLINIC | Age: 55
End: 2025-05-27
Payer: COMMERCIAL

## 2025-05-27 VITALS
OXYGEN SATURATION: 97 % | DIASTOLIC BLOOD PRESSURE: 63 MMHG | HEART RATE: 92 BPM | TEMPERATURE: 97.7 F | SYSTOLIC BLOOD PRESSURE: 133 MMHG | RESPIRATION RATE: 18 BRPM

## 2025-05-27 DIAGNOSIS — R06.02 SHORTNESS OF BREATH: ICD-10-CM

## 2025-05-27 PROCEDURE — 99213 OFFICE O/P EST LOW 20 MIN: CPT | Performed by: NURSE PRACTITIONER

## 2025-05-27 RX ORDER — FLUTICASONE PROPIONATE 50 MCG
1 SPRAY, SUSPENSION (ML) NASAL DAILY
Qty: 16 G | Refills: 0 | Status: SHIPPED | OUTPATIENT
Start: 2025-05-27

## 2025-05-27 RX ORDER — AMOXICILLIN 875 MG/1
875 TABLET, COATED ORAL 2 TIMES DAILY
Qty: 20 TABLET | Refills: 0 | Status: SHIPPED | OUTPATIENT
Start: 2025-05-27 | End: 2025-06-06

## 2025-05-27 RX ORDER — TOLTERODINE 4 MG/1
4 CAPSULE, EXTENDED RELEASE ORAL DAILY
COMMUNITY

## 2025-05-27 RX ORDER — NITROFURANTOIN 25; 75 MG/1; MG/1
1 CAPSULE ORAL 2 TIMES DAILY
COMMUNITY
Start: 2025-05-13

## 2025-05-27 RX ORDER — ALPRAZOLAM 0.25 MG
0.25 TABLET ORAL 2 TIMES DAILY PRN
Qty: 60 TABLET | Refills: 0 | Status: SHIPPED | OUTPATIENT
Start: 2025-05-27

## 2025-05-27 RX ORDER — ALBUTEROL SULFATE 90 UG/1
2 INHALANT RESPIRATORY (INHALATION) EVERY 6 HOURS PRN
Qty: 18 G | Refills: 2 | Status: SHIPPED | OUTPATIENT
Start: 2025-05-27

## 2025-05-27 NOTE — PROGRESS NOTES
"  [facility]        NAME: Emely Thibodeaux is a 54 y.o. female  : 1970    MRN: [id number]  DATE: May 27, 2025  TIME: 12:44 PM    Assessment and Plan   No primary diagnosis found.  1. Shortness of breath  albuterol (PROVENTIL HFA,VENTOLIN HFA) 90 mcg/act inhaler    fluticasone (FLONASE) 50 mcg/act nasal spray    amoxicillin (AMOXIL) 875 mg tablet            Patient Instructions     Patient Instructions   Rest and drink extra fluids.  Start antibiotic.  Take probiotic.  OTC cough and cold as needed.  Flonase can also be helpful.  Nasal saline flushes or miranda pot can help flush the sinuses.  Follow up with PCP if no improvement.  Go to ER with any worsening symptoms.     Patient Education     Sinusitis in adults   The Basics   Written by the doctors and editors at [facility]   What is sinusitis? -- Sinusitis is a condition that can cause a stuffy nose, pain in the face, and discharge or \"mucus\" from the nose.  The sinuses are hollow areas in the bones of the face (figure 1). They have a thin lining that normally makes a small amount of mucus. When this lining gets irritated or infected, it swells and makes extra mucus. This causes symptoms.  Sinusitis usually happens after a person gets sick with a cold. The germs causing the cold can infect the sinuses, too. Sometimes, other germs can be the cause of the infection. Often, a person feels like their cold is getting better. But then, they get sinusitis and begin to feel sick again.  What are the symptoms of sinusitis? -- Common symptoms of sinusitis include:   Stuffy or blocked nose   Thick white, yellow, or green discharge from the nose   Pain in the teeth   Pain or pressure in the face - This often feels worse when a person bends forward.  People with sinusitis can also have other symptoms, such as:   Fever   Cough   Trouble smelling   Ear pressure or fullness   Headache   Bad breath   Feeling tired  Most of the time, symptoms start to improve in 7 to 10 " days.  Should I see a doctor or nurse? -- See your doctor or nurse if your symptoms last more than 10 days, or if your symptoms first get better but then get worse.  Rarely, sinusitis can lead to serious problems. See your doctor or nurse right away (do not wait 10 days) if you have:   Fever higher than 102°F (38.9°C)   Sudden and severe pain in the face and head   Trouble seeing, or seeing double   Trouble thinking clearly   Swelling or redness around 1 or both eyes   Stiff neck  Is there anything I can do on my own to feel better? -- Yes. To help with your symptoms, you can:   Take an over-the-counter pain reliever to reduce the pain.   Rinse your nose and sinuses with salt water a few times a day - Ask your doctor or nurse about the best way to do this.   Drink plenty of fluids - Staying hydrated might help to thin the mucus and make it drain more easily.  Your doctor might also recommend a steroid nose spray to reduce the swelling in your nose, especially if you have allergies. Talk to your doctor if you are thinking of using a steroid spray.  How is sinusitis treated? -- Most of the time, sinusitis does not need to be treated with antibiotic medicines. This is because most sinusitis is caused by viruses, not bacteria, and antibiotics do not kill viruses. In fact, even sinusitis caused by bacteria will usually get better on its own without antibiotics.  Some people with sinusitis do need treatment with antibiotics. If your symptoms have not improved after 10 days, ask your doctor if you should take antibiotics. They might recommend that you wait 1 more week to see if your symptoms improve. But if you have symptoms such as a fever or a lot of pain, they might prescribe antibiotics. If you do get antibiotics, follow all of your doctor's instructions about taking them.  What if my symptoms do not get better? -- If your symptoms do not get better, talk with your doctor or nurse. They might order tests to figure out  "why you still have symptoms. These can include:   CT scan or other imaging tests - Imaging tests create pictures of the inside of the body.   A test to look inside the sinuses - For this test, a doctor puts a thin tube with a camera on the end into the nose and up into the sinuses.  Some people get a lot of sinus infections or have symptoms that last at least 3 months. These people can have a different type of sinusitis called \"chronic sinusitis.\" Chronic sinusitis can be caused by different things. For example, some people have growths inside their nose or sinuses that are called \"polyps.\" Other people have allergies that cause their symptoms.  Chronic sinusitis can be treated in different ways. If you have chronic sinusitis, talk with your doctor about which treatments are right for you.  All topics are updated as new evidence becomes available and our peer review process is complete.  This topic retrieved from [employer] on: Feb 28, 2024.  Topic 50802 Version 21.0  Release: 32.2.4 - C32.58  © 2024 UpToDate, Inc. and/or its affiliates. All rights reserved.  figure 1: Sinuses of the face     The sinuses are hollow areas in the bones of the face. This drawing shows where the sinuses are, from the side and front views. There are 4 pairs of sinuses, named for the bones around them: sphenoid, frontal, ethmoid, and maxillary.  Graphic 176970 Version 3.0  Consumer Information Use and Disclaimer   Disclaimer: This generalized information is a limited summary of diagnosis, treatment, and/or medication information. It is not meant to be comprehensive and should be used as a tool to help the user understand and/or assess potential diagnostic and treatment options. It does NOT include all information about conditions, treatments, medications, side effects, or risks that may apply to a specific patient. It is not intended to be medical advice or a substitute for the medical advice, diagnosis, or treatment of a health care " provider based on the health care provider's examination and assessment of a patient's specific and unique circumstances. Patients must speak with a health care provider for complete information about their health, medical questions, and treatment options, including any risks or benefits regarding use of medications. This information does not endorse any treatments or medications as safe, effective, or approved for treating a specific patient. UpToDate, Inc. and its affiliates disclaim any warranty or liability relating to this information or the use thereof.The use of this information is governed by the Terms of Use, available at https://www.[employer]/en/know/clinical-effectiveness-terms. 2024© UpToDate, Inc. and its affiliates and/or licensors. All rights reserved.  Copyright   © 2024 UpToDate, Inc. and/or its affiliates. All rights reserved.   Chief Complaint     Chief Complaint   Patient presents with    sinus congestion     Sinus congestion and pressure x 4 days. States throat burnings, ears feels itchy. No fevers but has chills. States she has some facial swelling. Taking zyrtec and mucinex.         History of Present Illness   Emely Thibodeaux presents to the clinic c/o    This is a 54 year old year old female here today for nasal congestion and sinus pressure.  She states she has had some allergy symptoms for the last 2 weeks.  She was away over the weekend.  She returned home yesterday and felt worse.  She has been using zyrtec for allergies. She states she feels pressure around her eyes.  Her ears are itchy.  She has not had fevers but some chills.        Review of Systems   Review of Systems   Constitutional:  Positive for chills. Negative for activity change and fatigue.   HENT:  Positive for congestion, rhinorrhea and sinus pain.    Respiratory:  Positive for cough.    Genitourinary: Negative.    Psychiatric/Behavioral: Negative.           Current Medications     Long-Term  Medications[1]    Current Allergies     Allergies as of 05/27/2025 - Reviewed 05/27/2025   Allergen Reaction Noted    Sulfa antibiotics Hives 04/09/2021            The following portions of the patient's history were reviewed and updated as appropriate: allergies, current medications, past family history, past medical history, past social history, past surgical history and problem list.    Objective   /63   Pulse 92   Temp 97.7 °F (36.5 °C)   Resp 18   SpO2 97%        Physical Exam     Physical Exam  Constitutional:       General: She is not in acute distress.     Appearance: Normal appearance. She is not ill-appearing or toxic-appearing.   HENT:      Head: Normocephalic and atraumatic.      Nose: Congestion and rhinorrhea present.     Eyes:      Conjunctiva/sclera: Conjunctivae normal.     Pulmonary:      Effort: Pulmonary effort is normal.      Breath sounds: Normal breath sounds.     Neurological:      Mental Status: She is alert and oriented to person, place, and time.     Psychiatric:         Mood and Affect: Mood normal.         Behavior: Behavior normal.         Thought Content: Thought content normal.         Judgment: Judgment normal.                  [1]   Long-Term Medications   Medication Sig Dispense Refill    ALPRAZolam (XANAX) 0.25 mg tablet take 1 tablet by mouth twice a day if needed for anxiety 60 tablet 0    etonogestrel-ethinyl estradiol (NuvaRing) 0.12-0.015 MG/24HR vaginal ring Insert vaginally and leave in place for 3 consecutive weeks, then remove for 1 week. 3 each 5    fluticasone (FLONASE) 50 mcg/act nasal spray 1 spray into each nostril daily 16 g 0    Magnesium Oxide -Mg Supplement 250 MG TABS Take 1 tablet (250 mg total) by mouth in the morning Take 1 tablet daily for 3 days, followed by repeat blood work after 1 week. 3 tablet 0    tolterodine (DETROL LA) 4 mg 24 hr capsule Take 4 mg by mouth daily      traZODone (DESYREL) 100 mg tablet take 3 tablets by mouth daily at  bedtime 270 tablet 1    [DISCONTINUED] ALPRAZolam (XANAX) 0.25 mg tablet take 1 tablet by mouth twice a day if needed for anxiety 60 tablet 0

## 2025-05-27 NOTE — PATIENT INSTRUCTIONS
"Rest and drink extra fluids.  Start antibiotic.  Take probiotic.  OTC cough and cold as needed.  Flonase can also be helpful.  Nasal saline flushes or miranda pot can help flush the sinuses.  Follow up with PCP if no improvement.  Go to ER with any worsening symptoms.     Patient Education     Sinusitis in adults   The Basics   Written by the doctors and editors at [facility]   What is sinusitis? -- Sinusitis is a condition that can cause a stuffy nose, pain in the face, and discharge or \"mucus\" from the nose.  The sinuses are hollow areas in the bones of the face (figure 1). They have a thin lining that normally makes a small amount of mucus. When this lining gets irritated or infected, it swells and makes extra mucus. This causes symptoms.  Sinusitis usually happens after a person gets sick with a cold. The germs causing the cold can infect the sinuses, too. Sometimes, other germs can be the cause of the infection. Often, a person feels like their cold is getting better. But then, they get sinusitis and begin to feel sick again.  What are the symptoms of sinusitis? -- Common symptoms of sinusitis include:   Stuffy or blocked nose   Thick white, yellow, or green discharge from the nose   Pain in the teeth   Pain or pressure in the face - This often feels worse when a person bends forward.  People with sinusitis can also have other symptoms, such as:   Fever   Cough   Trouble smelling   Ear pressure or fullness   Headache   Bad breath   Feeling tired  Most of the time, symptoms start to improve in 7 to 10 days.  Should I see a doctor or nurse? -- See your doctor or nurse if your symptoms last more than 10 days, or if your symptoms first get better but then get worse.  Rarely, sinusitis can lead to serious problems. See your doctor or nurse right away (do not wait 10 days) if you have:   Fever higher than 102°F (38.9°C)   Sudden and severe pain in the face and head   Trouble seeing, or seeing double   Trouble thinking " clearly   Swelling or redness around 1 or both eyes   Stiff neck  Is there anything I can do on my own to feel better? -- Yes. To help with your symptoms, you can:   Take an over-the-counter pain reliever to reduce the pain.   Rinse your nose and sinuses with salt water a few times a day - Ask your doctor or nurse about the best way to do this.   Drink plenty of fluids - Staying hydrated might help to thin the mucus and make it drain more easily.  Your doctor might also recommend a steroid nose spray to reduce the swelling in your nose, especially if you have allergies. Talk to your doctor if you are thinking of using a steroid spray.  How is sinusitis treated? -- Most of the time, sinusitis does not need to be treated with antibiotic medicines. This is because most sinusitis is caused by viruses, not bacteria, and antibiotics do not kill viruses. In fact, even sinusitis caused by bacteria will usually get better on its own without antibiotics.  Some people with sinusitis do need treatment with antibiotics. If your symptoms have not improved after 10 days, ask your doctor if you should take antibiotics. They might recommend that you wait 1 more week to see if your symptoms improve. But if you have symptoms such as a fever or a lot of pain, they might prescribe antibiotics. If you do get antibiotics, follow all of your doctor's instructions about taking them.  What if my symptoms do not get better? -- If your symptoms do not get better, talk with your doctor or nurse. They might order tests to figure out why you still have symptoms. These can include:   CT scan or other imaging tests - Imaging tests create pictures of the inside of the body.   A test to look inside the sinuses - For this test, a doctor puts a thin tube with a camera on the end into the nose and up into the sinuses.  Some people get a lot of sinus infections or have symptoms that last at least 3 months. These people can have a different type of  "sinusitis called \"chronic sinusitis.\" Chronic sinusitis can be caused by different things. For example, some people have growths inside their nose or sinuses that are called \"polyps.\" Other people have allergies that cause their symptoms.  Chronic sinusitis can be treated in different ways. If you have chronic sinusitis, talk with your doctor about which treatments are right for you.  All topics are updated as new evidence becomes available and our peer review process is complete.  This topic retrieved from [employer] on: Feb 28, 2024.  Topic 47362 Version 21.0  Release: 32.2.4 - C32.58  © 2024 UpToDate, Inc. and/or its affiliates. All rights reserved.  figure 1: Sinuses of the face     The sinuses are hollow areas in the bones of the face. This drawing shows where the sinuses are, from the side and front views. There are 4 pairs of sinuses, named for the bones around them: sphenoid, frontal, ethmoid, and maxillary.  Graphic 087088 Version 3.0  Consumer Information Use and Disclaimer   Disclaimer: This generalized information is a limited summary of diagnosis, treatment, and/or medication information. It is not meant to be comprehensive and should be used as a tool to help the user understand and/or assess potential diagnostic and treatment options. It does NOT include all information about conditions, treatments, medications, side effects, or risks that may apply to a specific patient. It is not intended to be medical advice or a substitute for the medical advice, diagnosis, or treatment of a health care provider based on the health care provider's examination and assessment of a patient's specific and unique circumstances. Patients must speak with a health care provider for complete information about their health, medical questions, and treatment options, including any risks or benefits regarding use of medications. This information does not endorse any treatments or medications as safe, effective, or approved for " treating a specific patient. UpToDate, Inc. and its affiliates disclaim any warranty or liability relating to this information or the use thereof.The use of this information is governed by the Terms of Use, available at https://www.[employer].com/en/know/clinical-effectiveness-terms. 2024© UpToDate, Inc. and its affiliates and/or licensors. All rights reserved.  Copyright   © 2024 UpToDate, Inc. and/or its affiliates. All rights reserved.

## 2025-06-24 DIAGNOSIS — G47.00 INSOMNIA, UNSPECIFIED TYPE: ICD-10-CM

## 2025-06-24 DIAGNOSIS — F41.9 ANXIETY: ICD-10-CM

## 2025-06-24 RX ORDER — ALPRAZOLAM 0.25 MG
0.25 TABLET ORAL 2 TIMES DAILY PRN
Qty: 60 TABLET | Refills: 0 | Status: SHIPPED | OUTPATIENT
Start: 2025-06-24

## 2025-06-24 RX ORDER — ALPRAZOLAM 0.25 MG
0.25 TABLET ORAL 2 TIMES DAILY PRN
Qty: 60 TABLET | Refills: 0 | Status: CANCELLED | OUTPATIENT
Start: 2025-06-24

## 2025-06-24 NOTE — TELEPHONE ENCOUNTER
Pt calling in because pharmacy has not received anything for prescriptions and pt is leaving out of town tomorrow for 2 weeks and needs them. Please advise ASAP.

## 2025-06-25 ENCOUNTER — TELEPHONE (OUTPATIENT)
Age: 55
End: 2025-06-25

## 2025-06-25 DIAGNOSIS — G47.00 INSOMNIA, UNSPECIFIED TYPE: ICD-10-CM

## 2025-06-25 RX ORDER — TRAZODONE HYDROCHLORIDE 100 MG/1
300 TABLET ORAL
Qty: 270 TABLET | Refills: 1 | Status: SHIPPED | OUTPATIENT
Start: 2025-06-25 | End: 2025-06-25 | Stop reason: SDUPTHER

## 2025-06-25 RX ORDER — TRAZODONE HYDROCHLORIDE 100 MG/1
300 TABLET ORAL
Qty: 270 TABLET | Refills: 1 | Status: SHIPPED | OUTPATIENT
Start: 2025-06-25

## 2025-06-25 NOTE — TELEPHONE ENCOUNTER
Patient called and said her script from today was sent to the wrong pharmacy.  She's asking that the trazodone be resent to ...    [facility]   [address]  [address]  [place] [address]  Phone# [phone]

## 2025-07-25 DIAGNOSIS — F41.9 ANXIETY: ICD-10-CM

## 2025-07-28 RX ORDER — ALPRAZOLAM 0.25 MG
0.25 TABLET ORAL 2 TIMES DAILY PRN
Qty: 60 TABLET | Refills: 0 | Status: SHIPPED | OUTPATIENT
Start: 2025-07-28